# Patient Record
Sex: FEMALE | Race: WHITE | NOT HISPANIC OR LATINO | Employment: STUDENT | URBAN - METROPOLITAN AREA
[De-identification: names, ages, dates, MRNs, and addresses within clinical notes are randomized per-mention and may not be internally consistent; named-entity substitution may affect disease eponyms.]

---
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
## 2020-01-03 ENCOUNTER — TRANSFERRED RECORDS (OUTPATIENT)
Dept: HEALTH INFORMATION MANAGEMENT | Facility: CLINIC | Age: 20
End: 2020-01-03

## 2020-01-03 LAB — RETINOPATHY: NEGATIVE

## 2020-01-16 ENCOUNTER — ALLIED HEALTH/NURSE VISIT (OUTPATIENT)
Dept: NURSING | Facility: CLINIC | Age: 20
End: 2020-01-16
Payer: COMMERCIAL

## 2020-01-16 DIAGNOSIS — E10.9 TYPE 1 DIABETES MELLITUS WITHOUT COMPLICATION (H): ICD-10-CM

## 2020-01-16 PROCEDURE — 90732 PPSV23 VACC 2 YRS+ SUBQ/IM: CPT

## 2020-01-16 PROCEDURE — 90471 IMMUNIZATION ADMIN: CPT

## 2020-03-26 DIAGNOSIS — E10.65 TYPE 1 DIABETES MELLITUS WITH HYPERGLYCEMIA (H): ICD-10-CM

## 2020-03-26 RX ORDER — PROCHLORPERAZINE 25 MG/1
1 SUPPOSITORY RECTAL
Qty: 1 EACH | Refills: 3 | Status: SHIPPED | OUTPATIENT
Start: 2020-03-26 | End: 2021-03-31

## 2020-03-26 RX ORDER — PROCHLORPERAZINE 25 MG/1
3 SUPPOSITORY RECTAL
Qty: 3 EACH | Refills: 11 | Status: SHIPPED | OUTPATIENT
Start: 2020-03-26 | End: 2021-03-31

## 2020-03-31 DIAGNOSIS — E10.65 TYPE 1 DIABETES MELLITUS WITH HYPERGLYCEMIA (H): ICD-10-CM

## 2020-04-07 ENCOUNTER — VIRTUAL VISIT (OUTPATIENT)
Dept: PEDIATRICS | Facility: CLINIC | Age: 20
End: 2020-04-07
Attending: PEDIATRICS
Payer: COMMERCIAL

## 2020-04-07 DIAGNOSIS — E10.649 TYPE 1 DIABETES MELLITUS WITH HYPOGLYCEMIA AND WITHOUT COMA (H): ICD-10-CM

## 2020-04-07 RX ORDER — BLOOD SUGAR DIAGNOSTIC
STRIP MISCELLANEOUS
Qty: 250 EACH | Refills: 11 | Status: SHIPPED | OUTPATIENT
Start: 2020-04-07 | End: 2021-03-31

## 2020-04-07 NOTE — PROGRESS NOTES
"  Pediatric Endocrinology Follow-up Consultation: Diabetes    Patient: Heidi Madden MRN# [id number]   YOB: 2000 Age: 19 year old   Date of Visit: Apr 7, 2020    Heidi Madden is a 19 year old female who is being evaluated via a billable video visit.      The patient has been notified of following:     \"This video visit will be conducted via a call between you and your physician/provider. We have found that certain health care needs can be provided without the need for an in-person physical exam.  This service lets us provide the care you need with a video conversation.  If a prescription is necessary we can send it directly to your pharmacy.  If lab work is needed we can place an order for that and you can then stop by our lab to have the test done at a later time.    Video visits are billed at different rates depending on your insurance coverage.  Please reach out to your insurance provider with any questions.    If during the course of the call the physician/provider feels a video visit is not appropriate, you will not be charged for this service.\"    Patient has given verbal consent for Video visit? Yes    Patient would like the video invitation sent by: Send to e-mail at: [email or web address]    Video Start Time: 08:09 AM    Heidi Madden complains of    Chief Complaint   Patient presents with     RECHECK     diabetes       I have reviewed and updated the patient's Past Medical History, Social History, Family History and Medication List.    ALLERGIES  No known drug allergies        Video-Visit Details    Type of service:  Video Visit    Video End Time (time video stopped): 08:37 AM    Originating Location (pt. Location): Home    Distant Location (provider location):  Home office due to the COVID-19 pandemic     Mode of Communication:  Video Conference via [facility]      Dear Dr. Lacey Hillman:    I had the pleasure of seeing your patient, Heidi Madden via a virtual (video) visit on " "Apr 7, 2020 for a follow-up consultation of type 1 diabetes.           Problem list:     Patient Active Problem List    Diagnosis Date Noted     Abnormal finding on thyroid function test 04/06/2018     Priority: Medium     Diabetes mellitus type 1 (H) 03/20/2018     Priority: Medium     Acne vulgaris 06/14/2015     Priority: Medium     Tinea versicolor 06/14/2015     Priority: Medium     Anxiety 11/17/2013     Priority: Medium     Mood swing 09/21/2013     Priority: Medium     Hematochezia 09/09/2013     Priority: Medium            HPI:   Heidi is a 19 year old female with Type 1 diabetes mellitus diagnosed 3/16/2018.    History was obtained from patient and electronic health record. Heidi was initially seen by Dr. Naldo Pisano in the ED on 3/16/2018, at which point her HbA1C was 10.7%, BG was 376 mg/dL and she had ketones in her urine and serum but was not acidotic. She had history of polyuria and weight loss. She was started on 15 units of Lantus (switched to Basaglar). I had the pleasure of evaluating her for the first time on 4/3/2018.     Interim History:  I conducted this virtual visit with Heidi.   Since her last clinic visit on 12/10/2019, she had been doing well overall, and has not had any hospitalizations or ED visits for diabetes, nor has she experienced any severe hypoglycemia requiring the use of glucagon.   Heidi has been having good BG levels over all. She has been having low BG levels after dinner. As a result, she's apprehensive about going to bed. She has to wake up between 2-4 am to check her BG levels. She no longer wants to wear her Dexcom as she explained that at one point the needle of the  got \"stuck in her skin\". She reports even taking a video recording of that but hasn't yet discussed it with the Dexcom representative or the diabetes team. She states that the Dexcom loses connectivity frequently.  She states she no longer feels that wearing Dexcom is safe but at the same time is " wondering how to get a full night's sleep without worrying about risk of hypoglycemia over night.     Today's concerns include: CGM issues  Date of diagnosis: 3/16/2018  Got her Dexcom: May 2019, started wearing it in June 2019  Hypoglycemia: Heidi is having 2-3 hypoglycemic readings per week. They typically occur after dinner.   Hyperglycemia: Elevated BG values tend to occur randomly and without a pattern.    DKA: Never.    Exercise:  no organized sports.    Blood Glucose Data:   Overall average: 133 mg/dL, SD --  BG checks/day: 4-9  Pattern: the glucose log shows a rare pattern of low glucose levels after dinner (at bed time) 2-3 times per week.     A1c:  Today s hemoglobin A1c:  Not done as this was a virtual visit.    Lab Results   Component Value Date    A1C 5.5 12/10/2019    A1C 6.1 09/10/2019    A1C 6.1 06/18/2019    A1C 5.7 02/19/2019    A1C 6.0 11/20/2018       Result was discussed at today's visit.     Current insulin regimen:   Injectable Insulin:   Lantus: 18 units at bedtime  Insulin lispro (Humalog):  Meal Coverage:   Breakfast: 1 unit per 6 gm carbohydrates  Lunch: 1 unit per 8 g  Dinner 1 unit per 8 g  Correction: 1 unit(s) per 50 mg/dL over 150    Insulin administration site(s): abdomen, thighs and arms.    I reviewed new history from the patient and the medical record.  I have reviewed previous lab results and records, patient BMI and the growth chart at today's virtual visit.  I have reviewed glucometer log.          Social History:   Heidi lives with her mother and her sister in [place], [place].  They have 2 dogs. Her father is not involved. She plays the trumpet. She likes to speak Monegasque and likes to watch movies.  She attends [employer] and is in her second year (she will graduate Spring 2020) and lives at home. She plans to go to [employer] in the fall to study Monegasque communication studies. She goes on bike rides, and goes on walks these days.      Diet: as per HPI (on and off a gluten-free diet).           Family History:     Family History   Problem Relation Age of Onset     Thyroid Disease Mother         hashimantonio'd     Family History Negative Father      Lipids Father      Psychotic Disorder Father         anger mood swings     Lipids Maternal Grandmother      Hypertension Maternal Grandfather      Diabetes Paternal Grandmother      Thyroid Disease Paternal Grandmother         hypo, partial thyroidectomy     Circulatory Paternal Grandmother         coroid arteries clogged, scraped     Heart Disease Paternal Grandfather         Angioplasty     Alcohol/Drug Paternal Grandfather      Family History Negative Sister      Family History Negative Sister      Lipids Sister         as early teen     Psychotic Disorder Other         bipolar  2 first cousins     Cancer No family hx of         sno cancer. maternal uncle   Diabetes: paternal grandmother (T2D). Maternal cousin and aunt (T2D), maternal great grandmother (is on insulin), and maternal uncle T2D.  Thyroid: Maternal grandmother and maternal aunt have hypothyroidism.   Celiac: paternal cousin was diagnosed with celiac disease (20's).    Family history was reviewed. Refer to the initial note.         Allergies:     Allergies   Allergen Reactions     No Known Drug Allergies            Medications:     Current Outpatient Medications   Medication Sig Dispense Refill     blood glucose monitoring (ONE TOUCH DELICA) lancets Use to test blood sugar 8 times daily or as directed. 300 each 11     Continuous Blood Gluc  (DEXCOM G6 ) [place] 1 each See Admin Instructions 1 Device 0     Continuous Blood Gluc Sensor (DEXCOM G6 SENSOR) MISC 3 each every 30 days 3 each 11     Continuous Blood Gluc Transmit (DEXCOM G6 TRANSMITTER) MISC 1 each every 3 months 1 each 3     Glucagon (BAQSIMI TWO PACK) 3 MG/DOSE POWD Spray 3 mg in nostril as needed (hypoglycemic siezure or unconsciousness) 1 each 1     insulin glargine (LANTUS SOLOSTAR) 100 UNIT/ML pen Inject 17  units daily. 15 mL 6     insulin lispro (HUMALOG KWIKPEN) 100 UNIT/ML pen Uses up to 40 units per day 15 mL 6     insulin pen needle (BD KRISTA U/F) 32G X 4 MM miscellaneous For use with insulin pen 8 times per day 250 each 6     ONETOUCH VERIO IQ test strip Use to test blood sugar 8 times daily or as directed. 250 each 11     Urine Glucose-Ketones Test STRP Check ketones when BG > 300 x 2 or when sick/vomiting 50 each 5           Review of Systems:   Gen: Negative.   Eye: Negative.  ENT: Negative.  Pulmonary:  Negative.  Cardiovascular: Negative.  Gastrointestinal: Negative.   Hematologic: Negative.  Genitourinary: Negative.  Musculoskeletal: Negative.  Psychiatric: she has not yet met with a psychologist, however, she used to meet with a  twice per week, who helps her cope. She has not met with the  in a while. She expressed feeling stressed by school, but a little less stressed as she's able to sleep in a little longer, and is taking her time at lunch given the shelter-in-place order.    Neurologic: Negative.  Skin: Negative.   Endocrine: as per above. Menarche at 13 years. Periods are regular.         Physical Exam:     Blood pressure percentiles are not available for patients who are 18 years or older.  Height: Data Unavailable, No height on file for this encounter.  Weight: 0 lbs 0 oz, No weight on file for this encounter.  BMI: There is no height or weight on file to calculate BMI., No height and weight on file for this encounter.      CONSTITUTIONAL:   Awake, alert, and in no apparent distress.   EYES: sclera appears anicteric  NECK: Symmetric thyroid, not enlarged.  LUNGS/Respiratory: No increased work of breathing.  NEUROLOGIC: alert, awake, oriented.   PSYCHIATRIC: Cooperative, no agitation.        Health Maintenance:   Type 1 Diabetes, Date of Diagnosis:  3/16/2018  History of DKA (cumulative, all dates): never  History of SHE (cumulative, all dates): never    Missed days of  school, related to diabetes concerns (DKA, hypoglycemia, or parental worry) excluding routine clinic appointments since last visit:  N/A  (Last visit date was:  12/10/2019)    Depression screening (10 yrs of age and older):    Today's PHQ-2 Score:     PHQ-2 ( 1999 Pfizer) 12/10/2019 9/10/2019   Q1: Little interest or pleasure in doing things 0 0   Q2: Feeling down, depressed or hopeless 1 0   PHQ-2 Score 1 0     Routine Health Screening for Diabetes  Last yearly labs: As below March 2019 (celiac and lipid panel), although she had thyroid labs 11/23/2018  Last influenza vaccine: nasal flu in November 2019  Last dental visit: Jan 2020  Last eye exam: Dec 2019        Laboratory results:     Hemoglobin A1C   Date Value Ref Range Status   12/10/2019 5.5 0 - 5.6 % Final     TSH   Date Value Ref Range Status   11/23/2018 3.52 0.40 - 4.00 mU/L Final     T4 Free   Date Value Ref Range Status   11/23/2018 0.91 0.76 - 1.46 ng/dL Final     Tissue Transglutaminase Antibody IgA   Date Value Ref Range Status   03/15/2019 <1 <7 U/mL Final     Comment:     Negative  The tTG-IgA assay has limited utility for patients with decreased levels of   IgA. Screening for celiac disease should include IgA testing to rule out   selective IgA deficiency and to guide selection and interpretation of   serological testing. tTG-IgG testing may be positive in celiac disease   patients with IgA deficiency.       Tissue Transglutaminase Patricia IgG   Date Value Ref Range Status   03/15/2019 1 <7 U/mL Final     Comment:     Negative     Cholesterol   Date Value Ref Range Status   03/15/2019 176 (H) <170 mg/dL Final     Comment:     Borderline high:  170-199 mg/dl  High:            >199 mg/dl       Triglycerides   Date Value Ref Range Status   03/15/2019 58 <90 mg/dL Final     Comment:     Fasting specimen     HDL Cholesterol   Date Value Ref Range Status   03/15/2019 55 >45 mg/dL Final     LDL Cholesterol Calculated   Date Value Ref Range Status    03/15/2019 109 <110 mg/dL Final     Comment:     Borderline high:  110-129 mg/dl  High:            >129 mg/dl       Non HDL Cholesterol   Date Value Ref Range Status   03/15/2019 121 (H) <120 mg/dL Final     Comment:     Borderline high:  120-144 mg/dl  High:            >144 mg/dl       Annual Labs:  TSH   Date Value Ref Range Status   11/23/2018 3.52 0.40 - 4.00 mU/L Final     T4 Free   Date Value Ref Range Status   11/23/2018 0.91 0.76 - 1.46 ng/dL Final     Tissue Transglutaminase Antibody IgA   Date Value Ref Range Status   03/15/2019 <1 <7 U/mL Final     Comment:     Negative  The tTG-IgA assay has limited utility for patients with decreased levels of   IgA. Screening for celiac disease should include IgA testing to rule out   selective IgA deficiency and to guide selection and interpretation of   serological testing. tTG-IgG testing may be positive in celiac disease   patients with IgA deficiency.       IGA   Date Value Ref Range Status   03/15/2019 51 (L) 70 - 380 mg/dL Final     No results found for: MICROL  No results found for: MICROALBUMIN  Creatinine   Date Value Ref Range Status   03/16/2018 0.56 0.50 - 1.00 mg/dL Final     No components found for: VID25    Diabetes Antibody Status (if checked):  No results found for: INAB, IA2ABY, IA2A, GLTA, ISCAB, [id number], FR392005, INSABRIA     Component      Latest Ref Rng & Units 11/23/2018   T4 Free      0.76 - 1.46 ng/dL 0.91   TSH      0.40 - 4.00 mU/L 3.52   Thyroglobulin Antibody      <40 IU/mL <20   Thyroid Peroxidase Antibody      <35 IU/mL <10     Component      Latest Ref Rng & Units 3/15/2019   Vitamin D Deficiency screening      20 - 75 ug/L 25          Assessment and Plan:   1- Type 1 diabetes mellitus with hypoglycemia  2- Hypoglycemia unawareness  3- Vitamin D insufficiency    Heidi is a 19 year old female with Type 1 diabetes mellitus diagnosed on 3/16/2018.  Her diabetes control is excellent. To mitigate night time hypoglycemia, I recommend  weakening her insulin:Carb ratio at dinner.     I discussed her concerns with the diabetes nurse educator (Asha) today and she will connect with Heidi today to trouble shoot and advise connecting with the Dexcom representative vs trying the Affinioan CGM. Given her hypoglycemia unawareness, she is a prime candidate for a CGM. She has started wearing the CGM June 2019.   Heidi has nasal glucagon (Baqsimi) at home.    She received the flu vaccinatin in November 2019.   She had her annual diabetes labs on 3/15/2019. Her celiac screen on March 2019 after being on a gluten-replete diet was negative with the caveat that her IgA level was low, which could give a false negative result. Heidi is questioning the results of her celiac screen. Given that Heidi is on a gluten-free diet, I recommend going back on a gluten-containing diet for 4 weeks then screening her for celiac disease. She will be due for labs in July 2020 (they were actually due in March, I'm postponing them due to the COVID-19 pandemic).    She graduates college Spring 2020. I discussed transition to an adult endocrinologist Summer 2020 if that is what she feels comfortable with. I am happy to continue to follow her if she would like to continue to come to our clinic for a couple more years.    Patient Instructions         1. Great talking to you today, Heidi! Keep up the good work!  2. HbA1c goal for Heidi:<7.5%   3. Yearly labs next due: July 2020 (they will be fasting labs). I suggest going back on a regular diet for at least 4 weeks before screening you for celiac in July 2020.    4. Asha Baez RN,will call you to discuss the issues you are having with the CGM.  5. Changes to diabetes plan: (see updated diabetes school plan below)  6. Flu shot at vaccination given in November 2019.    7. Please continue 2000 international units of vitamin D (D3) orally daily.  8. Follow up in 3 months. Please feel free to contact the diabetes team sooner if you are  having issues.     ------------------------------------------------------------------------------------------------------------------  DIABETES PLAN FOR Heidi Madden    How often to test:  Before breakfast  Before lunch  Before dinner  At bedtime  Other: with hypo- of hyperglycemia or at 2 AM if long-acting insulin doses were changed    Blood glucose goals:  Before meals and snacks:  mg/dL   At bedtime: 100-150 mg/dL     Insulin doses:  Long-acting (basal) insulin :   Lantus (Glargine): 18 units at bedtime (~ 10 pm daily)  Meal and snack (bolus) insulin Humalog  Carbohydrate ratio :  Humalog Take 1 unit(s) per 6 grams carbohydrate at breakfast.  Take 1 unit(s) per 8 grams carbohydrate at lunch.  Take 1 unit(s) per 10 grams carbohydrate at dinner (changed from 1 unit per 8 g).    Sensitivity/Correction insulin :  (in addition to scheduled meal dose - to correct out-of-range blood glucose):  1 unit per 50 over 150 mg/dL   Blood Glucose level  Humalog   151 to 200 mg/dl Give 1 unit   201 to 250 mg/dl Give 2 units   251 to 300 mg/dl Give 3 units   301 to 350 mg/dl Give 4 units   351 to 400 mg/dl Give 5 units   401 to 450 mg/dl Give 6 units   >450 mg/dl Give 7 units       Hyperglycemia (high blood glucose):  Ketones:  Check urine/blood ketones if Heidi is sick or blood glucose is above 240 twice in a row. Call parents or care team if ketones are present.  Check for ketones when sick or vomiting  Check for ketones when blood glucose is greater than 240 twice in a row. If ketones are present call your diabetes nurse or doctor.    Hypoglycemia (low blood glucose):  If blood glucose is 60 to 80:  1.  Eat or drink 1 carb unit (15 grams carbohydrate).   One carb unit equals:   - 1/2 cup (4 ounces) juice or regular soda pop, or   - 1 cup (8 ounces) milk, or   - 3 to 4 glucose tablets  2.  Re-check your blood glucose in 15 minutes.  3.  Repeat these steps every 15 minutes until your blood glucose is above 100.    If blood  glucose is under 60:  1.  Eat or drink 2 carb units (30 grams carbohydrate).  Two carb units equal:   - 1 cup (8 ounces) juice or regular soda pop, or   - 2 cups (16 ounces) milk, or   - 6 to 8 glucose tablets.  2.  Re-check your blood glucose in 15 minutes.  3.  Repeat these steps every 15 minutes until your blood glucose is above 100.    Contacting a doctor or a nurse:  You may contact your diabetes nurse with any questions.   Call: Asha Archer RN, [phone]  Your Provider is: Dr. Kimberli Gomez  ADDRESS: [facility] [address], [place] [address]  Fax: [phone]. Tel: [phone].  After business hours:  Call [phone] (TTY: [phone]).  Ask to speak with an endocrinologist (diabetes doctor).  A doctor is on-call 24 hours a day.    I had discussed Heidi's condition with the diabetes nurse educator today, and had independently reviewed the blood glucose downloads. Diabetes is a chronic illness with potential serious long term effects on various organs requiring intensive monitoring of therapy for safety and efficacy.       The plan had been discussed in detail with Heidi who is in agreement.  Thank you for allowing me to participate in the care of your patient.  Please do not hesitate to call with questions or concerns.    Video Start Time (time video stopped): 08:09 AM  Video End Time (time video stopped): 08:37 AM    Sincerely,    LISA SinhaBapt[facility], [place]  , Pediatric Endocrinology  [facility]   Tel. [phone]  Fax [phone]    CC  Patient Care Team:  Lacey Hillman MD as PCP - General  Lacey Hillman MD as Assigned PCP  Mariposa Mishra

## 2020-04-07 NOTE — PATIENT INSTRUCTIONS
1. Great talking to you today, Heidi! Keep up the good work!  2. HbA1c goal for Heidi:<7.5%   3. Yearly labs next due: July 2020 (they will be fasting labs). I suggest going back on a regular diet for at least 4 weeks before screening you for celiac in July 2020.    4. Asha Baez RN,will call you to discuss the issues you are having with the CGM.  5. Changes to diabetes plan: (see updated diabetes school plan below)  6. Flu shot at vaccination given in November 2019.    7. Please continue 2000 international units of vitamin D (D3) orally daily.  8. Follow up in 3 months. Please feel free to contact the diabetes team sooner if you are having issues.     ------------------------------------------------------------------------------------------------------------------  DIABETES PLAN FOR Heidi TYESHA Madden    How often to test:  Before breakfast  Before lunch  Before dinner  At bedtime  Other: with hypo- of hyperglycemia or at 2 AM if long-acting insulin doses were changed    Blood glucose goals:  Before meals and snacks:  mg/dL   At bedtime: 100-150 mg/dL     Insulin doses:  Long-acting (basal) insulin :   Lantus (Glargine): 18 units at bedtime (~ 10 pm daily)  Meal and snack (bolus) insulin Humalog  Carbohydrate ratio :  Humalog Take 1 unit(s) per 6 grams carbohydrate at breakfast.  Take 1 unit(s) per 8 grams carbohydrate at lunch.  Take 1 unit(s) per 10 grams carbohydrate at dinner (changed from 1 unit per 8 g).    Sensitivity/Correction insulin :  (in addition to scheduled meal dose - to correct out-of-range blood glucose):  1 unit per 50 over 150 mg/dL   Blood Glucose level  Humalog   151 to 200 mg/dl Give 1 unit   201 to 250 mg/dl Give 2 units   251 to 300 mg/dl Give 3 units   301 to 350 mg/dl Give 4 units   351 to 400 mg/dl Give 5 units   401 to 450 mg/dl Give 6 units   >450 mg/dl Give 7 units       Hyperglycemia (high blood glucose):  Ketones:  Check urine/blood ketones if Heidi is sick or blood  glucose is above 240 twice in a row. Call parents or care team if ketones are present.  Check for ketones when sick or vomiting  Check for ketones when blood glucose is greater than 240 twice in a row. If ketones are present call your diabetes nurse or doctor.    Hypoglycemia (low blood glucose):  If blood glucose is 60 to 80:  1.  Eat or drink 1 carb unit (15 grams carbohydrate).   One carb unit equals:   - 1/2 cup (4 ounces) juice or regular soda pop, or   - 1 cup (8 ounces) milk, or   - 3 to 4 glucose tablets  2.  Re-check your blood glucose in 15 minutes.  3.  Repeat these steps every 15 minutes until your blood glucose is above 100.    If blood glucose is under 60:  1.  Eat or drink 2 carb units (30 grams carbohydrate).  Two carb units equal:   - 1 cup (8 ounces) juice or regular soda pop, or   - 2 cups (16 ounces) milk, or   - 6 to 8 glucose tablets.  2.  Re-check your blood glucose in 15 minutes.  3.  Repeat these steps every 15 minutes until your blood glucose is above 100.    Contacting a doctor or a nurse:  You may contact your diabetes nurse with any questions.   Call: Asha Archer RN, [phone]  Your Provider is: Dr. Kimberli Gomez  ADDRESS: [facility] [address], [place] [address]  Fax: [phone]. Tel: [phone].  After business hours:  Call [phone] (TTY: [phone]).  Ask to speak with an endocrinologist (diabetes doctor).  A doctor is on-call 24 hours a day.

## 2020-04-07 NOTE — NURSING NOTE
"Heidi Madden is a 19 year old female who is being evaluated via a billable video visit.      The patient has been notified of following:     \"This video visit will be conducted via a call between you and your physician/provider. We have found that certain health care needs can be provided without the need for an in-person physical exam.  This service lets us provide the care you need with a video conversation.  If a prescription is necessary we can send it directly to your pharmacy.  If lab work is needed we can place an order for that and you can then stop by our lab to have the test done at a later time.    If during the course of the call the physician/provider feels a video visit is not appropriate, you will not be charged for this service.\"     Patient has given verbal consent for Video visit? Yes    Patient would like the video invitation sent by: Text to cell phone: [phone]    Video Start Time: 8:40am    Heidi Madden complains of    Chief Complaint   Patient presents with     RECHECK     diabetes       I have reviewed and updated the patient's Past Medical History, Social History, Family History and Medication List.    ALLERGIES  No known drug allergies          "

## 2020-07-02 ENCOUNTER — MYC MEDICAL ADVICE (OUTPATIENT)
Dept: ENDOCRINOLOGY | Facility: CLINIC | Age: 20
End: 2020-07-02

## 2020-07-06 NOTE — PROGRESS NOTES
"    Pediatric Endocrinology Follow-up Consultation: Diabetes    Patient: Heidi Madden MRN# [id number]   YOB: 2000 Age: 20 year old   Date of Visit: Jul 7, 2020    Heidi Madden is a 19 year old female who is being evaluated via a billable video visit.      The patient has been notified of following:     \"This video visit will be conducted via a call between you and your physician/provider. We have found that certain health care needs can be provided without the need for an in-person physical exam.  This service lets us provide the care you need with a video conversation.  If a prescription is necessary we can send it directly to your pharmacy.  If lab work is needed we can place an order for that and you can then stop by our lab to have the test done at a later time.    Video visits are billed at different rates depending on your insurance coverage.  Please reach out to your insurance provider with any questions.    If during the course of the call the physician/provider feels a video visit is not appropriate, you will not be charged for this service.\"    Patient has given verbal consent for Video visit? Yes    Patient would like the video invitation sent by: Send to e-mail at: [email or web address]    Video Start Time: 8:45 AM    Heidi Madden complains of    Chief Complaint   Patient presents with     Clinic Care Coordination - Follow-up     diabetes       I have reviewed and updated the patient's Past Medical History, Social History, Family History and Medication List.    ALLERGIES  No known drug allergies        Video-Visit Details    Type of service:  Video Visit    Video End Time (time video stopped): 08:37 AM    Originating Location (pt. Location): Home    Distant Location (provider location):  Home office due to the COVID-19 pandemic     Mode of Communication:  [facility]      Dear Dr. Lacey Hillman:    I had the pleasure of seeing your patient, Heidi Madden " via a virtual (video) visit on Jul 7, 2020 for a follow-up consultation of type 1 diabetes.           Problem list:     Patient Active Problem List    Diagnosis Date Noted     Abnormal finding on thyroid function test 04/06/2018     Priority: Medium     Type 1 diabetes mellitus with hypoglycemia (H) 03/20/2018     Priority: Medium     Acne vulgaris 06/14/2015     Priority: Medium     Tinea versicolor 06/14/2015     Priority: Medium     Anxiety 11/17/2013     Priority: Medium     Mood swing 09/21/2013     Priority: Medium     Hematochezia 09/09/2013     Priority: Medium            HPI:   Heidi is a 20 year old female with Type 1 diabetes mellitus diagnosed 3/16/2018.    History was obtained from patient and electronic health record. Heidi was initially seen by Dr. Naldo Pisano in the ED on 3/16/2018, at which point her HbA1C was 10.7%, BG was 376 mg/dL and she had ketones in her urine and serum but was not acidotic. She had history of polyuria and weight loss. She was started on 15 units of Lantus (switched to Basaglar). I had the pleasure of evaluating her for the first time on 4/3/2018.     Interim History:  I conducted this virtual visit with Heidi.   Since her last clinic visit on 4/7/2020, she had been doing well overall, and has not had any hospitalizations or ED visits for diabetes, nor has she experienced any severe hypoglycemia requiring the use of glucagon.   Heidi has been having low BG levels after dinner. As a result, she's apprehensive about going to bed. She has to wake up between 2-4 am to check her BG levels.   She decided to use the Dexcom yesterday so that she could sleep better at night. She had no issues over night.     Today's concerns include: None  Date of diagnosis: 3/16/2018  Got her Dexcom: May 2019, started wearing it in June 2019  Hypoglycemia: Heidi is having 2-3 hypoglycemic readings per week. They typically occur after dinner.   Hyperglycemia: Elevated BG values tend to occur randomly and  without a pattern.    DKA: Never.    Exercise:  Goes for out for a walk and a bike.    Blood Glucose Data:   Overall average: 122 mg/dL, SD --  BG checks/day: 5-6  I reviewed her glucose log 6/20/2020-7/4/2020. These are glucose levels for the past few days:   July 1  3:09 am: 100  8:21 am: 65  8:59 am: 167  1:25 pm: 100  7:25 pm: 93  9:41 pm: 159  July 2  3:13 am: 60  4:46 am: 154  6:09 am: 123  12:21 pm: 102  5:02 pm: 218  7:29 pm: 68  9:04 pm: 63  9:30 pm: 107  July 3  3:02 am: 191  1:39 pm: 131  4:37 pm: 297  6:23 pm: 138  7:36 pm: 64  July 4  12:55 am: 165  5:38 am: 172  10:16 am: 112  1:14 pm: 139  5:41 pm: 169    Pattern: the glucose log shows a rare pattern of low glucose levels after dinner (at bed time) 2-3 times per week.     A1c:  Today s hemoglobin A1c:  Not done as this was a virtual visit.    Lab Results   Component Value Date    A1C 5.5 12/10/2019    A1C 6.1 09/10/2019    A1C 6.1 06/18/2019    A1C 5.7 02/19/2019    A1C 6.0 11/20/2018       Result was discussed at today's visit.     Current insulin regimen:   Injectable Insulin:   Lantus: 15 units at bedtime  Insulin lispro (Humalog):  Meal Coverage:   Breakfast: 1 unit per 7.5-8 gm carbohydrates  Lunch: 1 unit per 8 g  Dinner 1 unit per 8 g  Correction: 1 unit(s) per 50 mg/dL over 150    Insulin administration site(s): abdomen, thighs and arms.    I reviewed new history from the patient and the medical record.  I have reviewed previous lab results and records, patient BMI and the growth chart at today's virtual visit.  I have reviewed glucometer log.          Social History:   Heidi lives with her mother and her sister in [place], [place].  They have 2 dogs. Her father is not involved. She plays the trumpet. She speaks Moroccan fluently and likes to watch movies.  She attends [employer] (she graduated Spring 2020) and lives at home. She plans to go to [employer] in the fall to study Moroccan communication studies. She goes on bike rides, and goes on walks these  days.      Diet: as per HPI (on and off a gluten-free diet).          Family History:     Family History   Problem Relation Age of Onset     Thyroid Disease Mother         hashimoyoto'd     Family History Negative Father      Lipids Father      Psychotic Disorder Father         anger mood swings     Lipids Maternal Grandmother      Hypertension Maternal Grandfather      Diabetes Paternal Grandmother      Thyroid Disease Paternal Grandmother         hypo, partial thyroidectomy     Circulatory Paternal Grandmother         coroid arteries clogged, scraped     Heart Disease Paternal Grandfather         Angioplasty     Alcohol/Drug Paternal Grandfather      Family History Negative Sister      Family History Negative Sister      Lipids Sister         as early teen     Psychotic Disorder Other         bipolar  2 first cousins     Cancer No family hx of         sno cancer. maternal uncle     Diabetes: paternal grandmother (T2D). Maternal cousin and aunt (T2D), maternal great grandmother (is on insulin), and maternal uncle T2D.  Thyroid: Maternal grandmother and maternal aunt have hypothyroidism.   Celiac: paternal cousin was diagnosed with celiac disease (20's).    Family history was reviewed. Refer to the initial note.         Allergies:     Allergies   Allergen Reactions     No Known Drug Allergies            Medications:     Current Outpatient Medications   Medication Sig Dispense Refill     insulin lispro (HUMALOG KWIKPEN) 100 UNIT/ML (1 unit dial) KWIKPEN The patient uses up to 40 units per day. 15 mL 6     ketoconazole (NIZORAL) 2 % external shampoo Apply topically daily as needed for itching or irritation Apply to skin twice per week for 4 weeks, allow 3 days apart between applications. 120 mL 0     blood glucose monitoring (ONE TOUCH DELICA) lancets Use to test blood sugar 8 times daily or as directed. 300 each 11     Continuous Blood Gluc  (DEXCOM G6 ) [place] 1 each See Admin Instructions 1 Device 0      Continuous Blood Gluc Sensor (DEXCOM G6 SENSOR) MISC 3 each every 30 days 3 each 11     Continuous Blood Gluc Transmit (DEXCOM G6 TRANSMITTER) MISC 1 each every 3 months 1 each 3     Glucagon (BAQSIMI TWO PACK) 3 MG/DOSE POWD Spray 3 mg in nostril as needed (hypoglycemic siezure or unconsciousness) 1 each 1     insulin glargine (LANTUS SOLOSTAR) 100 UNIT/ML pen Inject 17 units daily. 15 mL 6     insulin lispro (HUMALOG KWIKPEN) 100 UNIT/ML pen Uses up to 40 units per day 15 mL 6     insulin pen needle (BD KRISTA U/F) 32G X 4 MM miscellaneous For use with insulin pen 8 times per day 250 each 6     ONETOUCH VERIO IQ test strip Use to test blood sugar 8 times daily or as directed. 250 each 11     Urine Glucose-Ketones Test STRP Check ketones when BG > 300 x 2 or when sick/vomiting 50 each 5           Review of Systems:   Gen: Negative.   Eye: Negative.  ENT: Negative.  Pulmonary:  Negative.  Cardiovascular: Negative.  Gastrointestinal: Negative.   Hematologic: Negative.  Genitourinary: Negative.  Musculoskeletal: Negative.  Psychiatric: Negative.    Neurologic: Negative.  Skin: Tinea versicolor-- she has had twice before.   Endocrine: as per above. Menarche at 13 years. Periods are regular.         Physical Exam:     Blood pressure percentiles are not available for patients who are 18 years or older.  Height: Data Unavailable, Normalized stature-for-age data not available for patients older than 20 years.  Weight: 0 lbs 0 oz, Normalized weight-for-age data not available for patients older than 20 years.  BMI: There is no height or weight on file to calculate BMI., No height and weight on file for this encounter.      CONSTITUTIONAL:   Awake, alert, and in no apparent distress.   EYES: sclera appears anicteric  NECK: Symmetric thyroid, not enlarged.  LUNGS/Respiratory: No increased work of breathing.  NEUROLOGIC: alert, awake, oriented.   PSYCHIATRIC: Cooperative, no agitation.        Health Maintenance:   Type 1  Diabetes, Date of Diagnosis:  3/16/2018  History of DKA (cumulative, all dates): never  History of SHE (cumulative, all dates): never    Missed days of school, related to diabetes concerns (DKA, hypoglycemia, or parental worry) excluding routine clinic appointments since last visit:  N/A  (Last visit date was:  4/7/2020)    Depression screening (10 yrs of age and older):    Today's PHQ-2 Score:     PHQ-2 ( 1999 Pfizer) 12/10/2019 9/10/2019   Q1: Little interest or pleasure in doing things 0 0   Q2: Feeling down, depressed or hopeless 1 0   PHQ-2 Score 1 0     Routine Health Screening for Diabetes  Last yearly labs: As below March 2019 (celiac and lipid panel), although she had thyroid labs 11/23/2018  Last influenza vaccine: nasal flu in November 2019  Last dental visit: Jan 2020-- she has an appointment scheduled for this month.  Last eye exam: Dec 2019        Laboratory results:     Hemoglobin A1C   Date Value Ref Range Status   12/10/2019 5.5 0 - 5.6 % Final     TSH   Date Value Ref Range Status   11/23/2018 3.52 0.40 - 4.00 mU/L Final     T4 Free   Date Value Ref Range Status   11/23/2018 0.91 0.76 - 1.46 ng/dL Final     Tissue Transglutaminase Antibody IgA   Date Value Ref Range Status   03/15/2019 <1 <7 U/mL Final     Comment:     Negative  The tTG-IgA assay has limited utility for patients with decreased levels of   IgA. Screening for celiac disease should include IgA testing to rule out   selective IgA deficiency and to guide selection and interpretation of   serological testing. tTG-IgG testing may be positive in celiac disease   patients with IgA deficiency.       Tissue Transglutaminase Patricia IgG   Date Value Ref Range Status   03/15/2019 1 <7 U/mL Final     Comment:     Negative     Cholesterol   Date Value Ref Range Status   03/15/2019 176 (H) <170 mg/dL Final     Comment:     Borderline high:  170-199 mg/dl  High:            >199 mg/dl       Triglycerides   Date Value Ref Range Status   03/15/2019 58 <90  mg/dL Final     Comment:     Fasting specimen     HDL Cholesterol   Date Value Ref Range Status   03/15/2019 55 >45 mg/dL Final     LDL Cholesterol Calculated   Date Value Ref Range Status   03/15/2019 109 <110 mg/dL Final     Comment:     Borderline high:  110-129 mg/dl  High:            >129 mg/dl       Non HDL Cholesterol   Date Value Ref Range Status   03/15/2019 121 (H) <120 mg/dL Final     Comment:     Borderline high:  120-144 mg/dl  High:            >144 mg/dl       Annual Labs:  TSH   Date Value Ref Range Status   11/23/2018 3.52 0.40 - 4.00 mU/L Final     T4 Free   Date Value Ref Range Status   11/23/2018 0.91 0.76 - 1.46 ng/dL Final     Tissue Transglutaminase Antibody IgA   Date Value Ref Range Status   03/15/2019 <1 <7 U/mL Final     Comment:     Negative  The tTG-IgA assay has limited utility for patients with decreased levels of   IgA. Screening for celiac disease should include IgA testing to rule out   selective IgA deficiency and to guide selection and interpretation of   serological testing. tTG-IgG testing may be positive in celiac disease   patients with IgA deficiency.       IGA   Date Value Ref Range Status   03/15/2019 51 (L) 70 - 380 mg/dL Final     No results found for: MICROL  No results found for: MICROALBUMIN  Creatinine   Date Value Ref Range Status   03/16/2018 0.56 0.50 - 1.00 mg/dL Final     No components found for: VID25    Diabetes Antibody Status (if checked):  No results found for: INAB, IA2ABY, IA2A, GLTA, ISCAB, DW078295, BG501991, INSABRIA     Component      Latest Ref Rng & Units 11/23/2018   T4 Free      0.76 - 1.46 ng/dL 0.91   TSH      0.40 - 4.00 mU/L 3.52   Thyroglobulin Antibody      <40 IU/mL <20   Thyroid Peroxidase Antibody      <35 IU/mL <10     Component      Latest Ref Rng & Units 3/15/2019   Vitamin D Deficiency screening      20 - 75 ug/L 25          Assessment and Plan:   1- Type 1 diabetes mellitus with hypoglycemia  2- Hypoglycemia unawareness  3- Vitamin D  tarik Gordon is a 20 year old female with Type 1 diabetes mellitus diagnosed on 3/16/2018.  Her diabetes control is good. She continues to repeatedly have hypoglycemia after dinner. To mitigate night time hypoglycemia, I recommend weakening her insulin:Carb ratio at dinner.     I am pleased that she resumed using her Dexcom yesterday and so far is not having issues with it. Given her hypoglycemia unawareness, she is a prime candidate for a CGM.    Heidi has nasal glucagon (Baqsimi) at home.    She received the flu vaccinatin in November 2019.   She had her annual diabetes labs on 3/15/2019. Her celiac screen on March 2019 after being on a gluten-replete diet was negative with the caveat that her IgA level was low, which could give a false negative result. Heidi is questioning the results of her celiac screen. Given that Heidi is on a gluten-free diet, I recommend going back on a gluten-containing diet for 4 weeks then screening her for celiac disease. She is due for labs (they were actually due in March, I'm postponing them due to the COVID-19 pandemic).    She graduates college Spring 2020. I discussed transition to an adult endocrinologist Summer 2020 if that is what she feels comfortable with. I am happy to continue to follow her if she would like to continue to come to our clinic for a couple more years.    Patient Instructions         1. Great talking to you today, Heidi! Keep up the good work!  2. HbA1c goal for Heidi:<7.5%   3. Yearly labs next due: July 2020 (they will be fasting labs). I suggest going back on a regular diet for at least 4 weeks before screening you for celiac in July 2020.    4. Asha Baez RN,will call you to discuss the issues you are having with the CGM.  5. Changes to diabetes plan: (see updated diabetes school plan below)  6. Flu shot at vaccination given in November 2019.    7. Please continue 2000 international units of vitamin D (D3) orally daily.  8. Follow up in 3  months. Please feel free to contact the diabetes team sooner if you are having issues.   9.   Here is a suggestion for an adult endocrinologist:  Zakia Fox MD  To schedule an appointment with her call:   Appointments:[phone] ([phone])     ------------------------------------------------------------------------------------------------------------------  DIABETES PLAN FOR Heidi Madden    How often to test:  Before breakfast  Before lunch  Before dinner  At bedtime  Other: with hypo- of hyperglycemia or at 2 AM if long-acting insulin doses were changed    Blood glucose goals:  Before meals and snacks:  mg/dL   At bedtime: 100-150 mg/dL     Insulin doses:  Long-acting (basal) insulin :   Lantus (Glargine): 15 units at bedtime (~ 10 pm daily)  Meal and snack (bolus) insulin Humalog  Carbohydrate ratio :  Humalog Take 1 unit(s) per 7.5-8 grams carbohydrate at breakfast.  Take 1 unit(s) per 8 grams carbohydrate at lunch.  Take 1 unit(s) per 10 grams carbohydrate at dinner (changed from 1 unit per 8 g).    Sensitivity/Correction insulin :  (in addition to scheduled meal dose - to correct out-of-range blood glucose):  1 unit per 50 over 150 mg/dL   Blood Glucose level  Humalog   151 to 200 mg/dl Give 1 unit   201 to 250 mg/dl Give 2 units   251 to 300 mg/dl Give 3 units   301 to 350 mg/dl Give 4 units   351 to 400 mg/dl Give 5 units   401 to 450 mg/dl Give 6 units   >450 mg/dl Give 7 units       Hyperglycemia (high blood glucose):  Ketones:  Check urine/blood ketones if Heidi is sick or blood glucose is above 240 twice in a row. Call parents or care team if ketones are present.  Check for ketones when sick or vomiting  Check for ketones when blood glucose is greater than 240 twice in a row. If ketones are present call your diabetes nurse or doctor.    Hypoglycemia (low blood glucose):  If blood glucose is 60 to 80:  1.  Eat or drink 1 carb unit (15 grams carbohydrate).   One carb unit equals:   - 1/2  cup (4 ounces) juice or regular soda pop, or   - 1 cup (8 ounces) milk, or   - 3 to 4 glucose tablets  2.  Re-check your blood glucose in 15 minutes.  3.  Repeat these steps every 15 minutes until your blood glucose is above 100.    If blood glucose is under 60:  1.  Eat or drink 2 carb units (30 grams carbohydrate).  Two carb units equal:   - 1 cup (8 ounces) juice or regular soda pop, or   - 2 cups (16 ounces) milk, or   - 6 to 8 glucose tablets.  2.  Re-check your blood glucose in 15 minutes.  3.  Repeat these steps every 15 minutes until your blood glucose is above 100.    Contacting a doctor or a nurse:  You may contact your diabetes nurse with any questions.   Call: Asha Archer RN, [phone]  Your Provider is: Dr. Kimberli Gomez  ADDRESS: [facility] [address], [place] [address]  Fax: [phone]. Tel: [phone].  After business hours:  Call [phone] (TTY: [phone]).  Ask to speak with an endocrinologist (diabetes doctor).  A doctor is on-call 24 hours a day.    I had discussed Heidi's condition with the diabetes nurse educator today, and had independently reviewed the blood glucose downloads. Diabetes is a chronic illness with potential serious long term effects on various organs requiring intensive monitoring of therapy for safety and efficacy.       The plan had been discussed in detail with Heidi who is in agreement.  Thank you for allowing me to participate in the care of your patient.  Please do not hesitate to call with questions or concerns.    Video Start Time (time video stopped): 8:45 AM  Video End Time (time video stopped): 9:11 AM    Sincerely,    LISA SinhaMonroe County Hospital, MS  , Pediatric Endocrinology  [facility]   Tel. [phone]  Fax [phone]    CC  Patient Care Team:  Lacey Hillman MD as PCP - General  Lacey Hillman MD as Assigned PCP  Tad  Mariposa BURK

## 2020-07-06 NOTE — PATIENT INSTRUCTIONS
1. Great talking to you today, Heidi! Keep up the good work!  2. HbA1c goal for Heidi:<7.5%   3. Yearly labs next due: July 2020 (they will be fasting labs). I suggest going back on a regular diet for at least 4 weeks before screening you for celiac in July 2020.    4. Asha Baez RN,will call you to discuss the issues you are having with the CGM.  5. Changes to diabetes plan: (see updated diabetes school plan below)  6. Flu shot at vaccination given in November 2019.    7. Please continue 2000 international units of vitamin D (D3) orally daily.  8. Follow up in 3 months. Please feel free to contact the diabetes team sooner if you are having issues.   9.   Here is a suggestion for an adult endocrinologist:  Zakia Fox MD  To schedule an appointment with her call:   Appointments:[phone] ([phone])     ------------------------------------------------------------------------------------------------------------------  DIABETES PLAN FOR Heidi ARAMBULA Maryanne    How often to test:  Before breakfast  Before lunch  Before dinner  At bedtime  Other: with hypo- of hyperglycemia or at 2 AM if long-acting insulin doses were changed    Blood glucose goals:  Before meals and snacks:  mg/dL   At bedtime: 100-150 mg/dL     Insulin doses:  Long-acting (basal) insulin :   Lantus (Glargine): 15 units at bedtime (~ 10 pm daily)  Meal and snack (bolus) insulin Humalog  Carbohydrate ratio :  Humalog Take 1 unit(s) per 7.5-8 grams carbohydrate at breakfast.  Take 1 unit(s) per 8 grams carbohydrate at lunch.  Take 1 unit(s) per 10 grams carbohydrate at dinner (changed from 1 unit per 8 g).    Sensitivity/Correction insulin :  (in addition to scheduled meal dose - to correct out-of-range blood glucose):  1 unit per 50 over 150 mg/dL   Blood Glucose level  Humalog   151 to 200 mg/dl Give 1 unit   201 to 250 mg/dl Give 2 units   251 to 300 mg/dl Give 3 units   301 to 350 mg/dl Give 4 units   351 to 400 mg/dl Give 5  units   401 to 450 mg/dl Give 6 units   >450 mg/dl Give 7 units       Hyperglycemia (high blood glucose):  Ketones:  Check urine/blood ketones if Heidi is sick or blood glucose is above 240 twice in a row. Call parents or care team if ketones are present.  Check for ketones when sick or vomiting  Check for ketones when blood glucose is greater than 240 twice in a row. If ketones are present call your diabetes nurse or doctor.    Hypoglycemia (low blood glucose):  If blood glucose is 60 to 80:  1.  Eat or drink 1 carb unit (15 grams carbohydrate).   One carb unit equals:   - 1/2 cup (4 ounces) juice or regular soda pop, or   - 1 cup (8 ounces) milk, or   - 3 to 4 glucose tablets  2.  Re-check your blood glucose in 15 minutes.  3.  Repeat these steps every 15 minutes until your blood glucose is above 100.    If blood glucose is under 60:  1.  Eat or drink 2 carb units (30 grams carbohydrate).  Two carb units equal:   - 1 cup (8 ounces) juice or regular soda pop, or   - 2 cups (16 ounces) milk, or   - 6 to 8 glucose tablets.  2.  Re-check your blood glucose in 15 minutes.  3.  Repeat these steps every 15 minutes until your blood glucose is above 100.    Contacting a doctor or a nurse:  You may contact your diabetes nurse with any questions.   Call: Asha Archer RN, [phone]  Your Provider is: Dr. Kimberli Gomez  ADDRESS: [facility] [address], [place] [address]  Fax: [phone]. Tel: [phone].  After business hours:  Call [phone] (TTY: [phone]).  Ask to speak with an endocrinologist (diabetes doctor).  A doctor is on-call 24 hours a day.

## 2020-07-07 ENCOUNTER — VIRTUAL VISIT (OUTPATIENT)
Dept: PEDIATRICS | Facility: CLINIC | Age: 20
End: 2020-07-07
Attending: PEDIATRICS
Payer: COMMERCIAL

## 2020-07-07 DIAGNOSIS — B36.0 TINEA VERSICOLOR: ICD-10-CM

## 2020-07-07 DIAGNOSIS — E10.649 TYPE 1 DIABETES MELLITUS WITH HYPOGLYCEMIA AND WITHOUT COMA (H): Primary | ICD-10-CM

## 2020-07-07 RX ORDER — INSULIN LISPRO 100 [IU]/ML
INJECTION, SOLUTION INTRAVENOUS; SUBCUTANEOUS
Qty: 15 ML | Refills: 6 | Status: SHIPPED | OUTPATIENT
Start: 2020-07-07 | End: 2021-03-30

## 2020-07-07 RX ORDER — KETOCONAZOLE 20 MG/ML
SHAMPOO TOPICAL DAILY PRN
Qty: 120 ML | Refills: 0 | Status: SHIPPED | OUTPATIENT
Start: 2020-07-07 | End: 2022-05-25

## 2020-07-07 NOTE — NURSING NOTE
"Heidi Madden is a 20 year old female who is being evaluated via a billable video visit.      The patient has been notified of following:     \"This video visit will be conducted via a call between you and your physician/provider. We have found that certain health care needs can be provided without the need for an in-person physical exam.  This service lets us provide the care you need with a video conversation.  If a prescription is necessary we can send it directly to your pharmacy.  If lab work is needed we can place an order for that and you can then stop by our lab to have the test done at a later time.    Video visits are billed at different rates depending on your insurance coverage.  Please reach out to your insurance provider with any questions.    If during the course of the call the physician/provider feels a video visit is not appropriate, you will not be charged for this service.\"    Patient has given verbal consent for Video visit? Yes  How would you like to obtain your AVS? Juliánhart  Patient would like the video invitation sent by: Send to e-mail at: [email or web address]  Will anyone else be joining your video visit? No        Video-Visit Details    Type of service:  Video Visit    Video Start Time: 840  Video End Time: 920    Originating Location (pt. Location): Home    Distant Location (provider location):  [facility]     Platform used for Video Visit: Elena Stone RN on 7/7/2020 at 8:49 AM          "

## 2020-08-06 DIAGNOSIS — E10.65 TYPE 1 DIABETES MELLITUS WITH HYPERGLYCEMIA (H): ICD-10-CM

## 2020-08-06 RX ORDER — PEN NEEDLE, DIABETIC 32GX 5/32"
NEEDLE, DISPOSABLE MISCELLANEOUS
Qty: 250 EACH | Refills: 6 | Status: SHIPPED | OUTPATIENT
Start: 2020-08-06 | End: 2020-08-07

## 2020-08-07 DIAGNOSIS — E10.65 TYPE 1 DIABETES MELLITUS WITH HYPERGLYCEMIA (H): ICD-10-CM

## 2020-08-07 RX ORDER — PEN NEEDLE, DIABETIC 32GX 5/32"
NEEDLE, DISPOSABLE MISCELLANEOUS
Qty: 300 EACH | Refills: 6 | Status: SHIPPED | OUTPATIENT
Start: 2020-08-07 | End: 2021-03-31

## 2020-09-23 NOTE — PROGRESS NOTES
ED f/u 9/22/20 abdominal pain LM f/u 1d SUBJECTIVE:   Heidi Madden is a 18 year old female, with type 1 diabetes, who presents to clinic today because of:    Chief Complaint   Patient presents with     Abdominal Pain      HPI  Abdominal Symptoms/Constipation  Problem started: 1 months ago  Abdominal pain: YES  Fever: no  Vomiting: no  Diarrhea: no  Constipation: no  Frequency of stool: Daily most of the time.   Nausea: no  Urinary symptoms - pain or frequency: no  Therapies Tried: none  Sick contacts: None;  LMP:  01/18/2019  Click here for [place] stool scale.    Heidi presents today because of abdominal pain that has been present for over a month. She states that the pain is not severe, but she is bothered by how long she has been experiencing symptoms. This pain began initially in her right upper quadrant. It would occur only as a short burst of pain, which resolves quickly and leaves no residual pain.   Lately this pain has moved down into her right lower quadrant, she is no longer feeling pain in the upper right quadrant. The pain will occur several times a day. She notes no pattern to the time of day or in relation to food or activity. It does not wake her at night.     Heidi does not feel that her abdominal pain is related to passing gas. She does not feel she is more gassy lately.   She reports that her stools are anywhere between a 2 to a 6 on the bristol stool scale. She states that most typically they are a 3 or a 4. Usually they will be a 6 if she drinks coffee. She has passed a 2, but not within the last month.   No blood in the stool and no pain with passing BMs. Her abdominal pain does not change with passing a stool.     Heidi's diabetes has been under fairly good control lately. She makes slight adjustments to her insulin injections when needed, which is always normal. They are not running any higher or lower than normal. Usually her sugars are between lower 100's to upper 100's. Fasting is around 111.   She has had a couple of lows in the  "last month, with the lowest being around 63.     Her most recent menstrual cycle was about a week ago. This was normal. She is not taking any birth control.     Energy level and appetite are normal.      ROS  Constitutional, eye, ENT, skin, respiratory, cardiac, GI, MSK, neuro, and allergy are normal except as otherwise noted.    This document serves as a record of the services and decisions personally performed and made by Lacey Hillman MD. It was created on her behalf by Sona Munoz, a trained medical scribe. The creation of this document is based the provider's statements to the medical scribe.  Sona Munoz January 31, 2019 2:43 PM      PROBLEM LIST  Patient Active Problem List    Diagnosis Date Noted     Abnormal finding on thyroid function test 04/06/2018     Priority: Medium     Diabetes mellitus type 1 (H) 03/20/2018     Priority: Medium     Acne vulgaris 06/14/2015     Priority: Medium     Tinea versicolor 06/14/2015     Priority: Medium     Anxiety 11/17/2013     Priority: Medium     Mood swing 09/21/2013     Priority: Medium     Hematochezia 09/09/2013     Priority: Medium      MEDICATIONS  Current Outpatient Medications   Medication Sig Dispense Refill     blood glucose monitoring (ACCU-CHEK FASTCLIX) lancets Use to test blood sugar 8 times daily or as directed. 300 each 11     blood glucose monitoring (ACCU-CHEK GUIDE) test strip Use to test blood sugar 8 times daily or as directed. 250 strip 3     insulin glargine (BASAGLAR KWIKPEN) 100 UNIT/ML pen Inject 14 Units Subcutaneous daily 15 mL 3     insulin lispro (HUMALOG KWIKPEN) 100 UNIT/ML injection 1 unit per 15 grams for correction, 1 per 50 over 150 correction for hyperglycemia.  Uses up to 30 units per day 15 mL 3     Insulin Pen Needle (PEN NEEDLES 3/16\") 31G X 5 MM MISC For use with insulin pen 6 times per day 200 each 6     glucagon (GLUCAGON EMERGENCY) 1 MG kit Inject 1mg intramuscular for unconscious hypoglycemia only. (Patient not " "taking: Reported on 1/31/2019) 2 mg 1     ketoconazole (NIZORAL) 2 % shampoo APPLY TO THE AFFECTED AREA AND WASH OFF AFTER 5 MINUTES. DO THIS 3-5 X A WEEK. (Patient not taking: Reported on 11/23/2018) 120 mL 1     Urine Glucose-Ketones Test STRP Check ketones when BG > 300 x 2 or when sick/vomiting (Patient not taking: Reported on 1/31/2019) 50 each 5      ALLERGIES  Allergies   Allergen Reactions     No Known Drug Allergies        Reviewed and updated as needed this visit by clinical staff  Tobacco  Allergies  Meds         Reviewed and updated as needed this visit by Provider       OBJECTIVE:     /73 (BP Location: Right arm, Patient Position: Chair, Cuff Size: Adult Regular)   Pulse 70   Temp 99.3  F (37.4  C) (Oral)   Resp 16   Ht 5' 4.05\" (1.627 m)   Wt 125 lb (56.7 kg)   LMP 01/18/2019   SpO2 100%   BMI 21.42 kg/m    47 %ile based on CDC (Girls, 2-20 Years) Stature-for-age data based on Stature recorded on 1/31/2019.  49 %ile based on CDC (Girls, 2-20 Years) weight-for-age data based on Weight recorded on 1/31/2019.  50 %ile based on CDC (Girls, 2-20 Years) BMI-for-age based on body measurements available as of 1/31/2019.  Blood pressure percentiles are 42 % systolic and 80 % diastolic based on the August 2017 AAP Clinical Practice Guideline.    GENERAL: Active, alert, in no acute distress.  SKIN: Clear. No significant rash, abnormal pigmentation or lesions  HEAD: Normocephalic.  EYES:  No discharge or erythema. Normal pupils and EOM.  EARS: Normal canals. Tympanic membranes are normal; gray and translucent.  NOSE: Normal without discharge.  MOUTH/THROAT: Clear. No oral lesions. Teeth intact without obvious abnormalities.  NECK: Supple, no masses.  LYMPH NODES: No adenopathy  LUNGS: Clear. No rales, rhonchi, wheezing or retractions  HEART: Regular rhythm. Normal S1/S2. No murmurs.  ABDOMEN: Soft, non-tender, not distended, no masses or hepatosplenomegaly. Palpable stool from the RUQ down to the " pubic area. Bowel sounds normal.     DIAGNOSTICS: None    ASSESSMENT/PLAN:     1. Abdominal pain, right lower quadrant    2. Type 1 diabetes mellitus without complication (H)    3. Constipation, unspecified constipation type        Discussed differential diagnosis of abdominal pain.   Given the fact that Heidi's abdominal pain without any other symptoms, is very mild and brief and is staying on one side of the abdomen, and her blood sugars have been good. I am very confident that it is related not a serious medical problem such as ovarian torsion or an Appy.  She is not experiencing any other symptoms, has palpable stool in the RLQ and otherwise her exam is benign Her signs/symptoms are most consistant with constipation.   She has has a recent celiac test that was negative   I strongly encourage Heidi work to soften her stools. Increase fluids. Increase age appropriate fiber in the diet. If dietary measures are not working, then she may begin use of Miralax.   I have given due consideration to the possibility of acute abdomen, but I feel that diagnosis is unlikely based on a careful history and physical examination.   FOLLOW UP: If not improving or if worsening    The information in this document, created by the medical scribe for me, accurately reflects the services I personally performed and the decisions made by me. I have reviewed and approved this document for accuracy prior to leaving the patient care area.  January 31, 2019 3:13 PM    Lacey Hillman MD

## 2020-11-06 ENCOUNTER — TELEPHONE (OUTPATIENT)
Dept: ENDOCRINOLOGY | Facility: CLINIC | Age: 20
End: 2020-11-06

## 2020-11-06 NOTE — TELEPHONE ENCOUNTER
Heidi's 's form has been refaxed to the [employer]. Copy emailed to Heidi who was encouraged to follow up with the [employer] directly to confirm receipt. Heidi will plan to follow up.       Marzena Molina, RITAN, RN  Pediatric Diabetes Educator  [phone]

## 2020-11-16 ENCOUNTER — HEALTH MAINTENANCE LETTER (OUTPATIENT)
Age: 20
End: 2020-11-16

## 2021-01-12 ENCOUNTER — TRANSFERRED RECORDS (OUTPATIENT)
Dept: HEALTH INFORMATION MANAGEMENT | Facility: CLINIC | Age: 21
End: 2021-01-12

## 2021-01-12 LAB
RETINOPATHY: NEGATIVE
RETINOPATHY: NEGATIVE

## 2021-02-17 ENCOUNTER — MYC MEDICAL ADVICE (OUTPATIENT)
Dept: ENDOCRINOLOGY | Facility: CLINIC | Age: 21
End: 2021-02-17

## 2021-02-18 ENCOUNTER — DOCUMENTATION ONLY (OUTPATIENT)
Dept: PEDIATRICS | Facility: CLINIC | Age: 21
End: 2021-02-18

## 2021-02-19 NOTE — PROGRESS NOTES
I am not aware of which labs Heidi is due for.   These are likely being drawn for Dr Gomez.   Heidi has aged out of my practice.   It is best to contact Heidi directly and then the ordering physician.

## 2021-02-25 ENCOUNTER — DOCUMENTATION ONLY (OUTPATIENT)
Dept: ENDOCRINOLOGY | Facility: CLINIC | Age: 21
End: 2021-02-25

## 2021-02-25 DIAGNOSIS — E10.649 TYPE 1 DIABETES MELLITUS WITH HYPOGLYCEMIA AND WITHOUT COMA (H): Primary | ICD-10-CM

## 2021-03-01 DIAGNOSIS — E10.649 TYPE 1 DIABETES MELLITUS WITH HYPOGLYCEMIA AND WITHOUT COMA (H): ICD-10-CM

## 2021-03-02 ENCOUNTER — VIRTUAL VISIT (OUTPATIENT)
Dept: PEDIATRICS | Facility: CLINIC | Age: 21
End: 2021-03-02
Attending: PEDIATRICS
Payer: COMMERCIAL

## 2021-03-02 DIAGNOSIS — E10.649 TYPE 1 DIABETES MELLITUS WITH HYPOGLYCEMIA AND WITHOUT COMA (H): Primary | ICD-10-CM

## 2021-03-02 PROCEDURE — 99215 OFFICE O/P EST HI 40 MIN: CPT | Mod: GT | Performed by: PEDIATRICS

## 2021-03-02 NOTE — PATIENT INSTRUCTIONS
1. Great talking to you today, Heidi! Keep up the good work!  2. HbA1c goal for Heidi:<7%   3. Yearly labs next due: Due (they will be fasting labs). I suggest going back on a regular diet for at least 4 weeks before screening you for celiac in July 2020.    4. Changes to diabetes plan: (see updated diabetes school plan below)  5. Flu shot at vaccination given in November 2019. You are due for a flu vaccination. You can schedule a nurse visit here at [facility] to get it.    6. You had your last annual diabetes eye exam in December 2020.  7. Please continue 2000 international units of vitamin D (D3) orally daily.  8. I recommend you meet with the diabetes nurse educator to discuss considering a tandem pump.   9. Follow up in 3 months or 2 weeks after getting an insulin pump. Please feel free to contact the diabetes team sooner if you are having issues.   Here is a suggestion for an adult endocrinologist:  Zakia Fox MD  To schedule an appointment with her call:   Appointments:[phone] ([phone])     ------------------------------------------------------------------------------------------------------------------  DIABETES PLAN FOR Heidi Madden    How often to test:  Before breakfast  Before lunch  Before dinner  At bedtime  Other: with hypo- of hyperglycemia or at 2 AM if long-acting insulin doses were changed    Blood glucose goals:  Before meals and snacks:  mg/dL   At bedtime: 100-150 mg/dL     Insulin doses:  Long-acting (basal) insulin :   Lantus (Glargine): 21 units at bedtime (~ 10 pm daily) (increased from 19 units)  Meal and snack (bolus) insulin Humalog  Carbohydrate ratio :  Humalog Take 1 unit(s) per 4.5-5.5 grams carbohydrate at breakfast.  Take 1 unit(s) per 6 grams carbohydrate at lunch.  Take 1 unit(s) per 8 grams carbohydrate at dinner (changed from 1 unit per 6 g).    Sensitivity/Correction insulin :  (in addition to scheduled meal dose - to correct out-of-range blood  glucose):  1 unit per 50 over 150 mg/dL   Blood Glucose level  Humalog   151 to 200 mg/dl Give 1 unit   201 to 250 mg/dl Give 2 units   251 to 300 mg/dl Give 3 units   301 to 350 mg/dl Give 4 units   351 to 400 mg/dl Give 5 units   401 to 450 mg/dl Give 6 units   >450 mg/dl Give 7 units       Hyperglycemia (high blood glucose):  Ketones:  Check urine/blood ketones if Heidi is sick or blood glucose is above 240 twice in a row. Call parents or care team if ketones are present.  Check for ketones when sick or vomiting  Check for ketones when blood glucose is greater than 240 twice in a row. If ketones are present call your diabetes nurse or doctor.    Hypoglycemia (low blood glucose):  If blood glucose is 60 to 80:  1.  Eat or drink 1 carb unit (15 grams carbohydrate).   One carb unit equals:   - 1/2 cup (4 ounces) juice or regular soda pop, or   - 1 cup (8 ounces) milk, or   - 3 to 4 glucose tablets  2.  Re-check your blood glucose in 15 minutes.  3.  Repeat these steps every 15 minutes until your blood glucose is above 100.    If blood glucose is under 60:  1.  Eat or drink 2 carb units (30 grams carbohydrate).  Two carb units equal:   - 1 cup (8 ounces) juice or regular soda pop, or   - 2 cups (16 ounces) milk, or   - 6 to 8 glucose tablets.  2.  Re-check your blood glucose in 15 minutes.  3.  Repeat these steps every 15 minutes until your blood glucose is above 100.    Contacting a doctor or a nurse:  You may contact your diabetes nurse with any questions.   Call: Asha Archer RN, [phone]  Your Provider is: Dr. Kimberli Gomez  ADDRESS: [facility] [address], [place] [address]  Fax: [phone]. Tel: [phone].  After business hours:  Call [phone] (TTY: [phone]).  Ask to speak with an endocrinologist (diabetes doctor).  A doctor is on-call 24 hours a day.

## 2021-03-02 NOTE — NURSING NOTE
Heidi is a 20 year old who is being evaluated via a billable video visit.      How would you like to obtain your AVS? Mail a copy  If the video visit is dropped, the invitation should be resent by: Other e-mail: [email or web address]  Will anyone else be joining your video visit? No      Video Start Time:   Video-Visit Details    Type of service:  Video Visit    Video End Time:    Originating Location (pt. Location): Home    Distant Location (provider location):  [facility] [place]     Platform used for Video Visit: [employer]

## 2021-03-02 NOTE — PROGRESS NOTES
Pediatric Endocrinology Follow-up Consultation: Diabetes      Patient: Heidi Madden MRN# [id number]   YOB: 2000 Age: 20 year old   Date of Visit: Mar 2, 2021    Dear Dr. Lacey Hillman:    I had the pleasure of seeing your patient, Heidi Madden via a virtual (video) visit in the [facility] on Mar 2, 2021 for a follow-up consultation of type 1 diabetes.           Problem list:     Patient Active Problem List    Diagnosis Date Noted     Abnormal finding on thyroid function test 04/06/2018     Priority: Medium     Type 1 diabetes mellitus with hypoglycemia (H) 03/20/2018     Priority: Medium     Acne vulgaris 06/14/2015     Priority: Medium     Tinea versicolor 06/14/2015     Priority: Medium     Anxiety 11/17/2013     Priority: Medium     Mood swing 09/21/2013     Priority: Medium     Hematochezia 09/09/2013     Priority: Medium            HPI:   Heidi is a 20 year old female with Type 1 diabetes mellitus diagnosed 3/16/2018.    History was obtained from patient and electronic health record. Heidi was initially seen by Dr. Naldo Pisano in the ED on 3/16/2018, at which point her HbA1C was 10.7%, BG was 376 mg/dL and she had ketones in her urine and serum but was not acidotic. She had history of polyuria and weight loss. She was started on 15 units of Lantus (switched to Basaglar). I had the pleasure of evaluating her for the first time on 4/3/2018.     Interim History:  I conducted this virtual visit with Heidi.   Since her last clinic visit on 7/7/2020, she had been doing well overall, and has not had any hospitalizations or ED visits for diabetes, nor has she experienced any severe hypoglycemia requiring the use of glucagon.   Heidi has been having low BG levels after dinner.   She frequently checks her glucose levels at various times of the night and is not getting restful nights of sleep.    Today's concerns include: None  Date of diagnosis: 3/16/2018  Got her  Dexcom: May 2019, started wearing it in June 2019  Hypoglycemia: Heidi is having 3-4 hypoglycemic readings per week. They typically occur after dinner and just after midnight.   Hyperglycemia: Elevated BG values tend to occur following breakfast and also overnight.  It appears that these high glucose levels overnight tend to follow low glucose levels that occur in the evening.    DKA: Never.    Exercise: No organized sports.    Blood Glucose Data:       A1c:  Today s hemoglobin A1c:  Not done as this was a virtual visit.  Hemoglobin A1C   Date Value Ref Range Status   12/10/2019 5.5 0 - 5.6 % Final   09/10/2019 6.1 (A) 0 - 5.6 % Final   06/18/2019 6.1 (A) 0 - 5.6 % Final     Result was discussed at today's visit.     Current insulin regimen:   Injectable Insulin:   Lantus: 19 units at bedtime  Insulin lispro (Humalog):  Meal Coverage:   Breakfast: 1 unit per 4-5.5 gm carbohydrates (if cereal 4 or 4.5 g)  Lunch: 1 unit per 6 g  Dinner 1 unit per 6 g  Correction: 1 unit(s) per 50 mg/dL over 150    Insulin administration site(s): abdomen, thighs and arms.    I reviewed new history from the patient and the medical record.  I have reviewed previous lab results and records, patient BMI and the growth chart at today's virtual visit.  I have reviewed continuous glucose monitor (Dexcom) download.          Social History:   Heidi lives with her mother and her sister in [place], [place].  They have 1 dog. Her father is not involved. She plays the trumpet. She speaks Omani fluently and likes to watch movies.  She attends [employer] (she graduated Spring 2020) and lives at home. She attends [employer] to study Omani communication studies. She graduates in Spring 2022.    Diet: as per HPI (on and off a gluten-free diet).          Family History:     Family History   Problem Relation Age of Onset     Thyroid Disease Mother         chris'd     Family History Negative Father      Lipids Father      Psychotic Disorder Father          anger mood swings     Lipids Maternal Grandmother      Hypertension Maternal Grandfather      Diabetes Paternal Grandmother      Thyroid Disease Paternal Grandmother         hypo, partial thyroidectomy     Circulatory Paternal Grandmother         coroid arteries clogged, scraped     Heart Disease Paternal Grandfather         Angioplasty     Alcohol/Drug Paternal Grandfather      Family History Negative Sister      Family History Negative Sister      Lipids Sister         as early teen     Psychotic Disorder Other         bipolar  2 first cousins     Cancer No family hx of         sno cancer. maternal uncle     Diabetes: paternal grandmother (T2D). Maternal cousin and aunt (T2D), maternal great grandmother (is on insulin), and maternal uncle T2D.  Thyroid: Maternal grandmother and maternal aunt have hypothyroidism.   Celiac: paternal cousin was diagnosed with celiac disease (20's).    Family history was reviewed. Refer to the initial note.         Allergies:     Allergies   Allergen Reactions     No Known Drug Allergies            Medications:     Current Outpatient Medications   Medication Sig Dispense Refill     blood glucose monitoring (ONE TOUCH DELICA) lancets Use to test blood sugar 8 times daily or as directed. 300 each 11     Continuous Blood Gluc  (DEXCOM G6 ) [place] 1 each See Admin Instructions 1 Device 0     Continuous Blood Gluc Sensor (DEXCOM G6 SENSOR) MISC 3 each every 30 days 3 each 11     Continuous Blood Gluc Transmit (DEXCOM G6 TRANSMITTER) MISC 1 each every 3 months 1 each 3     Glucagon (BAQSIMI TWO PACK) 3 MG/DOSE POWD Spray 3 mg in nostril as needed (hypoglycemic siezure or unconsciousness) 1 each 1     insulin glargine (LANTUS SOLOSTAR) 100 UNIT/ML pen Inject 17 units daily. 15 mL 6     insulin lispro (HUMALOG KWIKPEN) 100 UNIT/ML (1 unit dial) KWIKPEN The patient uses up to 40 units per day. 15 mL 6     insulin lispro (HUMALOG KWIKPEN) 100 UNIT/ML pen Uses up to 40 units per  day 15 mL 6     insulin pen needle (BD KRISTA U/F) 32G X 4 MM miscellaneous For use with insulin pen 8 times per day 300 each 6     ketoconazole (NIZORAL) 2 % external shampoo Apply topically daily as needed for itching or irritation Apply to skin twice per week for 4 weeks, allow 3 days apart between applications. 120 mL 0     ONETOUCH VERIO IQ test strip Use to test blood sugar 8 times daily or as directed. 250 each 11     Urine Glucose-Ketones Test STRP Check ketones when BG > 300 x 2 or when sick/vomiting 50 each 5           Review of Systems:   Gen: Negative.   Eye: Negative.  ENT: Negative.  Pulmonary:  Negative.  Cardiovascular: Negative.  Gastrointestinal: Negative.  She is on a gluten-free diet by choice  Hematologic: Negative.  Genitourinary: Negative.  Musculoskeletal: Negative.  Psychiatric: Negative.    Neurologic: Negative.  Skin: Tinea versicolor-- she has had twice before.   Endocrine: as per above. Menarche at 13 years. Periods are regular.         Physical Exam:     Growth percentile SmartLinks can only be used for patients less than 20 years old.  Height: Data Unavailable, Facility age limit for growth percentiles is 20 years.  Weight: 0 lbs 0 oz, Facility age limit for growth percentiles is 20 years.  BMI: There is no height or weight on file to calculate BMI., No height and weight on file for this encounter.      CONSTITUTIONAL:   Awake, alert, and in no apparent distress.   EYES: sclera appears anicteric  NECK: Symmetric thyroid, not enlarged.  LUNGS/Respiratory: No increased work of breathing.  NEUROLOGIC: alert, awake, oriented.   PSYCHIATRIC: Cooperative, no agitation.        Health Maintenance:   Type 1 Diabetes, Date of Diagnosis:  3/16/2018  History of DKA (cumulative, all dates): never  History of SHE (cumulative, all dates): never    Missed days of school, related to diabetes concerns (DKA, hypoglycemia, or parental worry) excluding routine clinic appointments since last visit:   N/A    Depression screening (10 yrs of age and older):    Today's PHQ-2 Score:     PHQ-2 ( 1999 Pfizer) 12/10/2019 9/10/2019   Q1: Little interest or pleasure in doing things 0 0   Q2: Feeling down, depressed or hopeless 1 0   PHQ-2 Score 1 0     Routine Health Screening for Diabetes  Last yearly labs: As below March 2019 (celiac and lipid panel), although she had thyroid labs 11/23/2018  Last influenza vaccine: nasal flu in November 2019  Last dental visit: Julyn 2020  Last eye exam: Dec 2020        Laboratory results:     Hemoglobin A1C   Date Value Ref Range Status   12/10/2019 5.5 0 - 5.6 % Final     TSH   Date Value Ref Range Status   11/23/2018 3.52 0.40 - 4.00 mU/L Final     T4 Free   Date Value Ref Range Status   11/23/2018 0.91 0.76 - 1.46 ng/dL Final     Tissue Transglutaminase Antibody IgA   Date Value Ref Range Status   03/15/2019 <1 <7 U/mL Final     Comment:     Negative  The tTG-IgA assay has limited utility for patients with decreased levels of   IgA. Screening for celiac disease should include IgA testing to rule out   selective IgA deficiency and to guide selection and interpretation of   serological testing. tTG-IgG testing may be positive in celiac disease   patients with IgA deficiency.       Tissue Transglutaminase Patricia IgG   Date Value Ref Range Status   03/15/2019 1 <7 U/mL Final     Comment:     Negative     Cholesterol   Date Value Ref Range Status   03/15/2019 176 (H) <170 mg/dL Final     Comment:     Borderline high:  170-199 mg/dl  High:            >199 mg/dl       Triglycerides   Date Value Ref Range Status   03/15/2019 58 <90 mg/dL Final     Comment:     Fasting specimen     HDL Cholesterol   Date Value Ref Range Status   03/15/2019 55 >45 mg/dL Final     LDL Cholesterol Calculated   Date Value Ref Range Status   03/15/2019 109 <110 mg/dL Final     Comment:     Borderline high:  110-129 mg/dl  High:            >129 mg/dl       Non HDL Cholesterol   Date Value Ref Range Status    03/15/2019 121 (H) <120 mg/dL Final     Comment:     Borderline high:  120-144 mg/dl  High:            >144 mg/dl       Annual Labs:  TSH   Date Value Ref Range Status   11/23/2018 3.52 0.40 - 4.00 mU/L Final     T4 Free   Date Value Ref Range Status   11/23/2018 0.91 0.76 - 1.46 ng/dL Final     Tissue Transglutaminase Antibody IgA   Date Value Ref Range Status   03/15/2019 <1 <7 U/mL Final     Comment:     Negative  The tTG-IgA assay has limited utility for patients with decreased levels of   IgA. Screening for celiac disease should include IgA testing to rule out   selective IgA deficiency and to guide selection and interpretation of   serological testing. tTG-IgG testing may be positive in celiac disease   patients with IgA deficiency.       IGA   Date Value Ref Range Status   03/15/2019 51 (L) 70 - 380 mg/dL Final     No results found for: MICROL  No results found for: MICROALBUMIN  Creatinine   Date Value Ref Range Status   03/16/2018 0.56 0.50 - 1.00 mg/dL Final     No components found for: VID25    Diabetes Antibody Status (if checked):  No results found for: INAB, IA2ABY, IA2A, GLTA, ISCAB, [id number], [id number], INSABRIA     Component      Latest Ref Rng & Units 11/23/2018   T4 Free      0.76 - 1.46 ng/dL 0.91   TSH      0.40 - 4.00 mU/L 3.52   Thyroglobulin Antibody      <40 IU/mL <20   Thyroid Peroxidase Antibody      <35 IU/mL <10     Component      Latest Ref Rng & Units 3/15/2019   Vitamin D Deficiency screening      20 - 75 ug/L 25          Assessment and Plan:   1- Type 1 diabetes mellitus with hypoglycemia  2- Hypoglycemia unawareness  3- Vitamin D insufficiency  4-gluten-free diet by choice    Heidi is a 20 year old female with Type 1 diabetes mellitus diagnosed on 3/16/2018.  Her diabetes control is good. She continues to repeatedly have hypoglycemia after dinner.   She has fasting hyperglycemia despite getting a correction overnight.  Her download shows that her glucose levels overnight trend  upwards even when at midnight her glucose level is rated in the middle of the target range.  I therefore recommended increasing her dose of long-acting insulin especially that she is on a pretty aggressive insulin to carb ratio.  To mitigate night time hypoglycemia, I recommend weakening her insulin:Carb ratio at dinner.   I discussed the importance of prebolusing 15 to 20 minutes prior to her meals especially breakfast and dinner.  I revisited my recommendation that she consider using a closed-loop system to limit both high and low glucose levels.  I discussed the importance for her to get more sleep at night and discussed that the pump could take on many of the girls she currently does at night if she uses a closed-loop system.  I advised her to consider the control IQ technology offered by the tandem pump in conjunction with the Dexcom CGM.  She stated that she will research this and will get review somewhat me know if she changes her mind about considering an insulin pump.  Heidi has nasal glucagon (Baqsimi) at home.    She received the flu vaccinatin in November 2019.  She will schedule a nurse visit in our clinic to have the flu vaccination.  I informed her that she can have it at some of the pharmacies that offer her as well as another option.  She had her annual diabetes labs on 3/15/2019. Her celiac screen on March 2019 after being on a gluten-replete diet was negative with the caveat that her IgA level was low, which could give a false negative result. Heidi is questioning the results of her celiac screen. Given that Heidi is on a gluten-free diet, I recommend going back on a gluten-containing diet for 4 weeks then screening her for celiac disease. She is due for labs and plans to schedule a lab visit to have them drawn (will include hemoglobin A1c at the same time).    She graduates college Spring 2020. I discussed transition to an adult endocrinologist Summer 2020 if that is what she feels comfortable with.  I am happy to continue to follow her if she would like to continue to come to our clinic for a couple more years.    Patient Instructions         1. Great talking to you today, Heidi! Keep up the good work!  2. HbA1c goal for Heidi:<7%   3. Yearly labs next due: Due (they will be fasting labs). I suggest going back on a regular diet for at least 4 weeks before screening you for celiac in July 2020.    4. Changes to diabetes plan: (see updated diabetes school plan below)  5. Flu shot at vaccination given in November 2019. You are due for a flu vaccination. You can schedule a nurse visit here at [facility] to get it.    6. You had your last annual diabetes eye exam in December 2020.  7. Please continue 2000 international units of vitamin D (D3) orally daily.  8. I recommend you meet with the diabetes nurse educator to discuss considering a tandem pump.   9. Follow up in 3 months or 2 weeks after getting an insulin pump. Please feel free to contact the diabetes team sooner if you are having issues.   Here is a suggestion for an adult endocrinologist:  Zakia Fox MD  To schedule an appointment with her call:   Appointments:[phone] ([phone])     ------------------------------------------------------------------------------------------------------------------  DIABETES PLAN FOR Heidi ARAMBULA Maryanne    How often to test:  Before breakfast  Before lunch  Before dinner  At bedtime  Other: with hypo- of hyperglycemia or at 2 AM if long-acting insulin doses were changed    Blood glucose goals:  Before meals and snacks:  mg/dL   At bedtime: 100-150 mg/dL     Insulin doses:  Long-acting (basal) insulin :   Lantus (Glargine): 21 units at bedtime (~ 10 pm daily) (increased from 19 units)  Meal and snack (bolus) insulin Humalog  Carbohydrate ratio :  Humalog Take 1 unit(s) per 4.5-5.5 grams carbohydrate at breakfast.  Take 1 unit(s) per 6 grams carbohydrate at lunch.  Take 1 unit(s) per 8 grams carbohydrate at dinner  (changed from 1 unit per 6 g).    Sensitivity/Correction insulin :  (in addition to scheduled meal dose - to correct out-of-range blood glucose):  1 unit per 50 over 150 mg/dL   Blood Glucose level  Humalog   151 to 200 mg/dl Give 1 unit   201 to 250 mg/dl Give 2 units   251 to 300 mg/dl Give 3 units   301 to 350 mg/dl Give 4 units   351 to 400 mg/dl Give 5 units   401 to 450 mg/dl Give 6 units   >450 mg/dl Give 7 units       Hyperglycemia (high blood glucose):  Ketones:  Check urine/blood ketones if Heidi is sick or blood glucose is above 240 twice in a row. Call parents or care team if ketones are present.  Check for ketones when sick or vomiting  Check for ketones when blood glucose is greater than 240 twice in a row. If ketones are present call your diabetes nurse or doctor.    Hypoglycemia (low blood glucose):  If blood glucose is 60 to 80:  1.  Eat or drink 1 carb unit (15 grams carbohydrate).   One carb unit equals:   - 1/2 cup (4 ounces) juice or regular soda pop, or   - 1 cup (8 ounces) milk, or   - 3 to 4 glucose tablets  2.  Re-check your blood glucose in 15 minutes.  3.  Repeat these steps every 15 minutes until your blood glucose is above 100.    If blood glucose is under 60:  1.  Eat or drink 2 carb units (30 grams carbohydrate).  Two carb units equal:   - 1 cup (8 ounces) juice or regular soda pop, or   - 2 cups (16 ounces) milk, or   - 6 to 8 glucose tablets.  2.  Re-check your blood glucose in 15 minutes.  3.  Repeat these steps every 15 minutes until your blood glucose is above 100.    Contacting a doctor or a nurse:  You may contact your diabetes nurse with any questions.   Call: Asha Archer RN, [phone]  Your Provider is: Dr. Kimberli Gomez  ADDRESS: [facility] [address], [place] [id number]  Fax: [phone]. Tel: [phone].  After business hours:  Call [phone] (TTY: [phone]).  Ask to speak with an endocrinologist (diabetes  doctor).  A doctor is on-call 24 hours a day.    I had discussed Heidi's condition with the diabetes nurse educator today, and had independently reviewed the blood glucose downloads. Diabetes is a chronic illness with potential serious long term effects on various organs requiring intensive monitoring of therapy for safety and efficacy.       The plan had been discussed in detail with Heidi who is in agreement.  Thank you for allowing me to participate in the care of your patient.  Please do not hesitate to call with questions or concerns.    Review of the result(s) of each unique test - CGM download  40 minutes spent on the date of the encounter doing chart review, history and exam, documentation and further activities as noted above  {  Video start time: 10:05 AM  Video end time: 11: AM      Sincerely,    Elida Sinha, MS  , Pediatric Endocrinology  [facility]   Tel. [phone]  Fax [phone]    CC  Patient Care Team:  Lacey Hillman MD as Assigned PCP  Mariposa Mishra, Kimberli Savage MD as Assigned Pediatric Specialist Provider

## 2021-03-30 DIAGNOSIS — E10.649 TYPE 1 DIABETES MELLITUS WITH HYPOGLYCEMIA AND WITHOUT COMA (H): ICD-10-CM

## 2021-03-30 RX ORDER — INSULIN LISPRO 100 [IU]/ML
INJECTION, SOLUTION INTRAVENOUS; SUBCUTANEOUS
Qty: 15 ML | Refills: 6 | Status: SHIPPED | OUTPATIENT
Start: 2021-03-30 | End: 2021-03-31

## 2021-03-31 DIAGNOSIS — E10.65 TYPE 1 DIABETES MELLITUS WITH HYPERGLYCEMIA (H): ICD-10-CM

## 2021-03-31 DIAGNOSIS — E10.649 TYPE 1 DIABETES MELLITUS WITH HYPOGLYCEMIA AND WITHOUT COMA (H): ICD-10-CM

## 2021-03-31 RX ORDER — PEN NEEDLE, DIABETIC 32GX 5/32"
NEEDLE, DISPOSABLE MISCELLANEOUS
Qty: 300 EACH | Refills: 11 | Status: SHIPPED | OUTPATIENT
Start: 2021-03-31 | End: 2022-05-03

## 2021-03-31 RX ORDER — LANCETS 33 GAUGE
8 EACH MISCELLANEOUS DAILY
Qty: 300 EACH | Refills: 11 | Status: SHIPPED | OUTPATIENT
Start: 2021-03-31

## 2021-03-31 RX ORDER — PROCHLORPERAZINE 25 MG/1
1 SUPPOSITORY RECTAL
Qty: 1 EACH | Refills: 3 | Status: SHIPPED | OUTPATIENT
Start: 2021-03-31 | End: 2022-04-06

## 2021-03-31 RX ORDER — INSULIN LISPRO 100 [IU]/ML
INJECTION, SOLUTION INTRAVENOUS; SUBCUTANEOUS
Qty: 30 ML | Refills: 6 | Status: SHIPPED | OUTPATIENT
Start: 2021-03-31 | End: 2021-06-02

## 2021-03-31 RX ORDER — BLOOD SUGAR DIAGNOSTIC
STRIP MISCELLANEOUS
Qty: 250 STRIP | Refills: 11 | Status: SHIPPED | OUTPATIENT
Start: 2021-03-31 | End: 2022-04-06

## 2021-03-31 RX ORDER — GLUCAGON 3 MG/1
3 POWDER NASAL PRN
Qty: 1 EACH | Refills: 1 | Status: SHIPPED | OUTPATIENT
Start: 2021-03-31 | End: 2022-05-25

## 2021-03-31 RX ORDER — PROCHLORPERAZINE 25 MG/1
3 SUPPOSITORY RECTAL
Qty: 3 EACH | Refills: 11 | Status: SHIPPED | OUTPATIENT
Start: 2021-03-31 | End: 2022-04-06

## 2021-05-29 ENCOUNTER — HEALTH MAINTENANCE LETTER (OUTPATIENT)
Age: 21
End: 2021-05-29

## 2021-06-02 DIAGNOSIS — E10.65 TYPE 1 DIABETES MELLITUS WITH HYPERGLYCEMIA (H): ICD-10-CM

## 2021-06-02 RX ORDER — INSULIN LISPRO 100 [IU]/ML
INJECTION, SOLUTION INTRAVENOUS; SUBCUTANEOUS
Qty: 30 ML | Refills: 6 | Status: SHIPPED | OUTPATIENT
Start: 2021-06-02 | End: 2021-08-23

## 2021-09-18 ENCOUNTER — HEALTH MAINTENANCE LETTER (OUTPATIENT)
Age: 21
End: 2021-09-18

## 2021-11-13 ENCOUNTER — HEALTH MAINTENANCE LETTER (OUTPATIENT)
Age: 21
End: 2021-11-13

## 2021-12-01 ENCOUNTER — TELEPHONE (OUTPATIENT)
Dept: ENDOCRINOLOGY | Facility: CLINIC | Age: 21
End: 2021-12-01
Payer: COMMERCIAL

## 2021-12-01 ENCOUNTER — MYC MEDICAL ADVICE (OUTPATIENT)
Dept: PEDIATRICS | Facility: CLINIC | Age: 21
End: 2021-12-01
Payer: COMMERCIAL

## 2021-12-01 DIAGNOSIS — E10.649 TYPE 1 DIABETES MELLITUS WITH HYPOGLYCEMIA AND WITHOUT COMA (H): Primary | ICD-10-CM

## 2021-12-01 RX ORDER — INSULIN GLARGINE-YFGN 100 [IU]/ML
16 INJECTION, SOLUTION SUBCUTANEOUS DAILY
Qty: 15 ML | Refills: 11 | Status: SHIPPED | OUTPATIENT
Start: 2021-12-01 | End: 2022-04-06

## 2021-12-01 NOTE — TELEPHONE ENCOUNTER
The patient called me back and informed me that she's currently taking 19 units of Lantus. I discussed reducing her dose by 15-20% when starting Semglee (16 units). I placed the prescription, and verified that it is sent to the correct pharmacy.  She seemed worried about this change in formulary. I advised her to let me know if she experiences any issues or side effects with his medication. She agreed.    Elida Sinha, MS    Pediatric Endocrinology   [facility]

## 2021-12-02 ENCOUNTER — TELEPHONE (OUTPATIENT)
Dept: ENDOCRINOLOGY | Facility: CLINIC | Age: 21
End: 2021-12-02
Payer: COMMERCIAL

## 2021-12-03 NOTE — TELEPHONE ENCOUNTER
Prior Authorization Approval    Authorization Effective Date: 11/3/2021  Authorization Expiration Date: 12/3/2022  Medication: Insulin Glargine-yfgn (SEMGLEE, YFGN,) 100 UNIT/ML SOPN--APPROVED  Approved Dose/Quantity:   Reference #:     Insurance Company: [employer] - Phone [phone] Fax [phone]  Expected CoPay:       CoPay Card Available:      Foundation Assistance Needed:    Which Pharmacy is filling the prescription (Not needed for infusion/clinic administered): [facility] - [place] [address]   Pharmacy Notified: Yes  Patient Notified: Yes **Instructed pharmacy to notify patient when script is ready to /ship.**

## 2021-12-03 NOTE — TELEPHONE ENCOUNTER
PA Initiation    Medication: Insulin Glargine-yfgn (SEMGLEE, YFGN,) 100 UNIT/ML SOPN  Insurance Company: [employer] - Phone [phone] Fax [phone]  Pharmacy Filling the Rx: [facility] - [place], [place] - [facility] [address]  Filling Pharmacy Phone: [phone]  Filling Pharmacy Fax: [phone]  Start Date: 12/3/2021

## 2021-12-09 ENCOUNTER — TELEPHONE (OUTPATIENT)
Dept: ENDOCRINOLOGY | Facility: CLINIC | Age: 21
End: 2021-12-09
Payer: COMMERCIAL

## 2021-12-09 NOTE — TELEPHONE ENCOUNTER
Spoke with patient via [email or web address]. Sent in RX for Lantus, as Semglee is preferred starting 1/1.

## 2021-12-09 NOTE — TELEPHONE ENCOUNTER
[facility] Call Center    Phone Message    May a detailed message be left on voicemail: yes     Reason for Call: Other: Caller states refill for Semglee was not delivered and she is going to run out. Writer attempted diabetes priority line, no answer. Caller stated need was urgent and insisted on speaking with someone today so writer transferred to page on call provider.     Action Taken: Message routed to:  Other: Peds Diabetes [facility] - [place], [place]    Travel Screening: Not Applicable

## 2022-01-08 ENCOUNTER — HEALTH MAINTENANCE LETTER (OUTPATIENT)
Age: 22
End: 2022-01-08

## 2022-01-15 ENCOUNTER — TRANSFERRED RECORDS (OUTPATIENT)
Dept: HEALTH INFORMATION MANAGEMENT | Facility: CLINIC | Age: 22
End: 2022-01-15
Payer: COMMERCIAL

## 2022-01-27 DIAGNOSIS — E10.65 TYPE 1 DIABETES MELLITUS WITH HYPERGLYCEMIA (H): Primary | ICD-10-CM

## 2022-03-31 ENCOUNTER — MYC MEDICAL ADVICE (OUTPATIENT)
Dept: ENDOCRINOLOGY | Facility: CLINIC | Age: 22
End: 2022-03-31
Payer: COMMERCIAL

## 2022-03-31 DIAGNOSIS — E10.65 TYPE 1 DIABETES MELLITUS WITH HYPERGLYCEMIA (H): ICD-10-CM

## 2022-03-31 DIAGNOSIS — E10.649 TYPE 1 DIABETES MELLITUS WITH HYPOGLYCEMIA AND WITHOUT COMA (H): ICD-10-CM

## 2022-04-06 RX ORDER — PROCHLORPERAZINE 25 MG/1
1 SUPPOSITORY RECTAL
Qty: 1 EACH | Refills: 0 | Status: SHIPPED | OUTPATIENT
Start: 2022-04-06 | End: 2022-08-23

## 2022-04-06 RX ORDER — BLOOD SUGAR DIAGNOSTIC
STRIP MISCELLANEOUS
Qty: 750 STRIP | Refills: 0 | Status: SHIPPED | OUTPATIENT
Start: 2022-04-06 | End: 2023-03-09

## 2022-04-06 RX ORDER — INSULIN LISPRO 100 [IU]/ML
INJECTION, SOLUTION INTRAVENOUS; SUBCUTANEOUS
Qty: 70 ML | Refills: 0 | Status: SHIPPED | OUTPATIENT
Start: 2022-04-06 | End: 2022-10-17

## 2022-04-06 RX ORDER — INSULIN GLARGINE-YFGN 100 [IU]/ML
21 INJECTION, SOLUTION SUBCUTANEOUS DAILY
Qty: 30 ML | Refills: 0 | Status: SHIPPED | OUTPATIENT
Start: 2022-04-06 | End: 2022-10-18

## 2022-04-06 RX ORDER — PROCHLORPERAZINE 25 MG/1
3 SUPPOSITORY RECTAL
Qty: 9 EACH | Refills: 0 | Status: SHIPPED | OUTPATIENT
Start: 2022-04-06 | End: 2022-10-17

## 2022-05-03 DIAGNOSIS — E10.65 TYPE 1 DIABETES MELLITUS WITH HYPERGLYCEMIA (H): ICD-10-CM

## 2022-05-03 RX ORDER — PEN NEEDLE, DIABETIC 32GX 5/32"
NEEDLE, DISPOSABLE MISCELLANEOUS
Qty: 300 EACH | Refills: 0 | Status: SHIPPED | OUTPATIENT
Start: 2022-05-03 | End: 2022-06-13

## 2022-05-23 ASSESSMENT — ENCOUNTER SYMPTOMS
LEG PAIN: 0
SORE THROAT: 0
ORTHOPNEA: 0
SLEEP DISTURBANCES DUE TO BREATHING: 0
SINUS PAIN: 0
SYNCOPE: 0
EXERCISE INTOLERANCE: 0
HOARSE VOICE: 0
NECK MASS: 0
LIGHT-HEADEDNESS: 0
HYPOTENSION: 0
TROUBLE SWALLOWING: 0
PALPITATIONS: 1
TASTE DISTURBANCE: 0
SMELL DISTURBANCE: 0
SINUS CONGESTION: 0
HYPERTENSION: 0

## 2022-05-24 ENCOUNTER — LAB REQUISITION (OUTPATIENT)
Dept: LAB | Facility: CLINIC | Age: 22
End: 2022-05-24

## 2022-05-24 PROCEDURE — 86481 TB AG RESPONSE T-CELL SUSP: CPT | Performed by: INTERNAL MEDICINE

## 2022-05-25 ENCOUNTER — OFFICE VISIT (OUTPATIENT)
Dept: ENDOCRINOLOGY | Facility: CLINIC | Age: 22
End: 2022-05-25
Attending: PEDIATRICS
Payer: COMMERCIAL

## 2022-05-25 VITALS
SYSTOLIC BLOOD PRESSURE: 120 MMHG | RESPIRATION RATE: 16 BRPM | BODY MASS INDEX: 23.56 KG/M2 | DIASTOLIC BLOOD PRESSURE: 75 MMHG | HEIGHT: 65 IN | WEIGHT: 141.4 LBS | OXYGEN SATURATION: 99 % | HEART RATE: 89 BPM | TEMPERATURE: 98.1 F

## 2022-05-25 DIAGNOSIS — E10.65 TYPE 1 DIABETES MELLITUS WITH HYPERGLYCEMIA (H): Primary | ICD-10-CM

## 2022-05-25 DIAGNOSIS — E10.649 TYPE 1 DIABETES MELLITUS WITH HYPOGLYCEMIA AND WITHOUT COMA (H): ICD-10-CM

## 2022-05-25 LAB — HBA1C MFR BLD: 6.1 % (ref 0–5.6)

## 2022-05-25 PROCEDURE — 95251 CONT GLUC MNTR ANALYSIS I&R: CPT | Performed by: INTERNAL MEDICINE

## 2022-05-25 PROCEDURE — 99214 OFFICE O/P EST MOD 30 MIN: CPT | Performed by: INTERNAL MEDICINE

## 2022-05-25 PROCEDURE — 82043 UR ALBUMIN QUANTITATIVE: CPT | Performed by: INTERNAL MEDICINE

## 2022-05-25 PROCEDURE — 99207 PR FOOT EXAM NO CHARGE: CPT | Performed by: INTERNAL MEDICINE

## 2022-05-25 PROCEDURE — 82565 ASSAY OF CREATININE: CPT | Performed by: INTERNAL MEDICINE

## 2022-05-25 PROCEDURE — 84443 ASSAY THYROID STIM HORMONE: CPT | Performed by: INTERNAL MEDICINE

## 2022-05-25 PROCEDURE — 36415 COLL VENOUS BLD VENIPUNCTURE: CPT | Performed by: INTERNAL MEDICINE

## 2022-05-25 PROCEDURE — 83036 HEMOGLOBIN GLYCOSYLATED A1C: CPT | Performed by: INTERNAL MEDICINE

## 2022-05-25 RX ORDER — GLUCAGON 3 MG/1
3 POWDER NASAL PRN
Qty: 1 EACH | Refills: 1 | Status: SHIPPED | OUTPATIENT
Start: 2022-05-25 | End: 2023-06-05

## 2022-05-25 NOTE — PATIENT INSTRUCTIONS
[facility]  Dr Fox, Endocrinology Department    [facility]   [address] [facility]  [place] [address]  Appointment [phone]  Fax: [phone]  [place]: Monday - Thursday      Please check the cost coverage and copay with insurance before recommended tests, services and medications (especially if new medications are prescribed).     If ordered, please get blood work done 1 week prior to your next appointment so they will be available to Dr. Fox at your visit.    To provide the best diabetic care, please bring your blood glucose meter to each and every visit with your  Endocrinologist. Your blood glucose CGM/meter/insulin pump will be downloaded at every appointment.  Please arrive 15 minutes before your scheduled appointment. This will allow for your blood glucose CGM/meter/insulin pump  to be downloaded.  If you are wearing DEXCOM please bring  or sharing code from the Dexcom Clarity Gonzalez so that it can be downloaded.  If you are using [employer] personal sensors please bring the reader.      Labs today.  Continue current insulin regimen.  Avoid snacking at night.  Repeat labs and follow up in 3 months.  Continue to use dexcom.    Recommend checking blood sugars before meals and at bedtime.    If Blood glucose are low more often-> 2-3 times/week- give us a call.  Make better food choices: reduce carbs, Reduce portion size, weight loss and exercise 3-4 times a week.    What is hypoglycemia:  Hypoglycemia is when blood sugar levels become too low - below 70 m/dl.      What causes hypoglycemia?  - using too much insulin  -taking too many diabetes pills  -not eating enough, or skipping meals or snacks  -not eating enough carbohydrate with meals  -changing your exercise routine  -drinking alcohol in excess    It is also possible to have hypoglycemia even when you are carefully managing your blood sugar levels.    What does it feel like  when blood sugars get too low?  You may feel:  - anxious  -confused  -dizzy  -hungry  -light-headed  -nervous  -shaky  -sleepy  -sweaty    You may have  -blurred or cloudy vision  -heart palpitations (heart skips a beat or races)  -tingling or numbness around the mouth and tongue  -tremors    What to do if you have symptoms of hypoglycmemia:  If you think your blood sugar is too low, check it with a glucose meter.  If its below 70 mg/dl, consume one of the following:  Fruit juice (1/2 cup)  Glucose tablets (15 grams)  Hard candy (5 to 7 pieces)  Honey or sugar (2 teaspoons)  Milk (1/2 cup)  Soft drink (non-diet, 1/2 cup)    Wait 15 minutes and check your blood glucose again.  IF it is still below 70 mg/dl, have another food item listed above. Wait another 15 minutes and repeat the blood glucose test.  Have a small meal or snack that contains some carbohydrate after your blood glucose rises above 70 mg/dl.    If you are at risk of hypoglycemia, always carry with you glucose tablets or one of the foods listed above.      To prevent Hypoglycemia:  Avoid situations that may cause hypoglycemia  Before making any change to your diet or exercise routine, discuss them with your healthcare provider  Keep a record of your blood glucose levels.  Include the time of day, diabetes medications, when you had your last meal or snack, and what you were doing at the time (e.g. Watching TV, gardening, jogging, etc).    Talk to your healthcare provider if your blood glucose levels are often low        Patient guide on hypoglycemia    http://www.hormone.org/Resources/upload/patient-guide-diagnosis-and-management-hypoglycemia-417254.[email or web address]

## 2022-05-25 NOTE — LETTER
2022         RE: Heidi Madden  [address] [place] [address]        Dear Colleague,    Thank you for referring your patient, Heidi Madden, to the [facility]. Please see a copy of my visit note below.    Endocrinology Clinic Note:  Name: Heidi Madden  Seen in consultation with  for Diabetes.  HPI:  Heidi Madden is a 21 year old female who presents for the evaluation/management of type 1 diabetes.  Was seen by pediatric endocrinologist  before.    Using MDI and Dexcom.  She is not interested in pump at this time.    She graduated this year and will be starting job at [employer].  Living with mother and sister.    She reports that currently she is going to bed at night and sometimes snacking at night.  As she starts new job [employer] will be more structured and she will going to bed at a later time.  Noted to have slightly high blood sugars overnight on the days when she is going to bed late or having snacks at night.       1. Type 1 DM:  Orginally diagnosed: 3/16/2018  Hospitalization for DKA: no  Current Regimen:   Semglee: 20 units/day  Humalo unit/ 5 gm of Carbs + sliding scale insulin 1 unit 50 > 150.  Typically takes about 10-20 TID.    yes:     Diabetes Medication(s)     Diabetic Other       Glucagon (BAQSIMI TWO PACK) 3 MG/DOSE POWD    Spray 3 mg in nostril as needed (hypoglycemic siezure or unconsciousness)    Insulin       Insulin Glargine-yfgn (SEMGLEE, YFGN,) 100 UNIT/ML SOPN    Inject 21 Units Subcutaneous daily     insulin lispro (HUMALOG KWIKPEN) 100 UNIT/ML (1 unit dial) KWIKPEN    The patient uses up to 70 units per day.          BS checks: dexcom  Average Meter Download: Blood glucose data reviewed personally. See nursing note from this encounter for details.  Blood sugars are mostly in acceptable range.  Exercise: walks/ bike ride everyday.  Last A1c:  Symptoms of hypoglycemia (low blood sugar): yes  Using PUMP:      DM  Complications:   Complications:   Diabetes Complications  Description / Detail    Diabetic Retinopathy  No   CAD / PAD  No   Neuropathy  No   Nephropathy / Microalbuminuria  No  No results found for: UMALCR      Gastroparesis  No   Hypoglycemia [place]  No          2. Hypertension: Blood Pressure today:   BP Readings from Last 3 Encounters:   05/25/22 120/75   12/10/19 123/81   11/14/19 106/73   .     3. Hyperlipidemia:  On medication    PMH/PSH:  History reviewed. No pertinent past medical history.  Past Surgical History:   Procedure Laterality Date     C REIMPLANT URETER,VESICOPSOAS HITCH  01/05     Family Hx:  Family History   Problem Relation Age of Onset     Thyroid Disease Mother         hashimoyoto'd     Family History Negative Father      Lipids Father      Psychotic Disorder Father         anger mood swings     Lipids Maternal Grandmother      Hypertension Maternal Grandfather      Diabetes Paternal Grandmother      Thyroid Disease Paternal Grandmother         hypo, partial thyroidectomy     Circulatory Paternal Grandmother         coroid arteries clogged, scraped     Heart Disease Paternal Grandfather         Angioplasty     Alcohol/Drug Paternal Grandfather      Family History Negative Sister      Family History Negative Sister      Lipids Sister         as early teen     Psychotic Disorder Other         bipolar  2 first cousins     Cancer No family hx of         sno cancer. maternal uncle           DM2: aunt, uncle and pgm.           Social Hx:  Social History     Socioeconomic History     Marital status: Single     Spouse name: Not on file     Number of children: Not on file     Years of education: Not on file     Highest education level: Not on file   Occupational History     Not on file   Tobacco Use     Smoking status: Never Smoker     Smokeless tobacco: Never Used     Tobacco comment: No one in family smokes.   Substance and Sexual Activity     Alcohol use: No     Drug use: No     Sexual  "activity: Never     Comment: constantine march 2018   Other Topics Concern     Not on file   Social History Narrative     Not on file     Social Determinants of Health     Financial Resource Strain: Not on file   Food Insecurity: Not on file   Transportation Needs: Not on file   Physical Activity: Not on file   Stress: Not on file   Social Connections: Not on file   Intimate Partner Violence: Not on file   Housing Stability: Not on file          MEDICATIONS:  has a current medication list which includes the following prescription(s): dexcom g6 , dexcom g6 sensor, dexcom g6 transmitter, baqsimi two pack, insulin glargine-yfgn, insulin lispro, bd lynda u/f, onetouch delica lancets 33g, onetouch verio iq, and urine glucose-ketones test.    ROS     ROS: 10 point ROS neg other than the symptoms noted above in the HPI.    Physical Exam   VS: /75 (BP Location: Left arm, Patient Position: Chair, Cuff Size: Adult Regular)   Pulse 89   Temp 98.1  F (36.7  C) (Oral)   Resp 16   Ht 1.638 m (5' 4.5\")   Wt 64.1 kg (141 lb 6.4 oz)   LMP 04/30/2022   SpO2 99%   Breastfeeding No   BMI 23.90 kg/m    GENERAL: AXOX3, NAD, well dressed, answering questions appropriately, appears stated age.  HEENT: No exopthalmous, no proptosis, EOMI, no lig lag, no retraction  NECK: Thyroid normal in size, non tender, no nodules were palpated.  No lymphadenopathy.  CV: RRR  LUNGS: CTAB, no wheezing  ABDOMEN: non distended, +BS  NEUROLOGY: CN grossly intact, no tremors  DM FOOT EXAM: normal DP and PT pulses, no trophic changes or ulcerative lesions, normal sensory exam and normal monofilament exam.   MSK: grossly intact, no swelling, + pulses.  SKIN: no rashes, no lesions  PSYCH: normal affect and mood      LABS:  A1c:  Lab Results   Component Value Date    A1C 5.5 12/10/2019    A1C 6.1 09/10/2019    A1C 6.1 06/18/2019    A1C 5.7 02/19/2019    A1C 6.0 11/20/2018       BMP:  Creatinine   Date Value Ref Range Status   03/16/2018 0.56 0.50 - " 1.00 mg/dL Final       Urine Micro:  No results found for: MICROL  No results found for: MICROALBUMIN      LFTs/Lipids:  Recent Labs   Lab Test 03/15/19  0909   CHOL 176*   HDL 55      TRIG 58       TFTs:  Lab Results   Component Value Date    TSH 3.52 11/23/2018       Blood Glucose and pump data/ Meter reviewed.     All pertinent notes, labs, and images personally reviewed by me.       Glucometer/ insulin pump (if applicable)/ CGM data (if applicable) downloaded, Personally reviewed and interpreted.  All Blood sugar data reviewed personally and interpreted as well as discussed with pt.  See nursing note from 5/25/2022 for details of BG/CGM log.  Where applicable   All past medical, social and Family history reviewed and updated in [facility].    A/P  Ms.Abby TYESHA Madden is a 21 year old here for the evaluation/management of diabetes:    1. DM1 - Under Fair control.  A1c 5.1%.  No known diabetes related complications.  No recent A1c to review.  She is using multiple injections plus dexcom.  She is not interested in insulin pump at this time.  In general blood sugars are in acceptable range.  Plan:  Discussed diagnosis, pathophysiology, management and treatment options of condition with pt.  I also discussed importance of strict blood sugar control to prevent complications associated with uncontrolled diabetes.    Labs today.  Continue current insulin regimen.  Avoid snacking at night.  Repeat labs and follow up in 3 months.  Continue to use dexcom.    Plan: SC FOOT EXAM NO CHARGE, ASPIRIN NOT PRESCRIBED         (INTENTIONAL), STATIN NOT PRESCRIBED         (INTENTIONAL), Hemoglobin A1c, Albumin Random         Urine Quantitative with Creat Ratio, Glucagon         (BAQSIMI TWO PACK) 3 MG/DOSE POWD, Creatinine,         TSH with free T4 reflex         2. Hypertension - Under Good  control. Not on medication.      3. Hyperlipidemia - Under Good control. Not on medication.  4. Prevention  Ophthalmology-recommend  annually  ASA-not indicated secondary to age  Smoking-non-smoker    Foot exam- 5/25/2022      Most Recent Immunizations   Administered Date(s) Administered     COVID-19,PF,Moderna 05/18/2021     Comvax (HIB/HepB) 07/11/2001     DTAP (<7y) 06/29/2004     HEPA 03/26/2013     HPV 03/26/2013     Influenza Intranasal Vaccine 09/21/2013     Influenza Intranasal Vaccine 4 valent (FluMist) 11/23/2018     Influenza Vaccine IM > 6 months Valent IIV4 (Alfuria,Fluzone) 03/27/2017     Influenza Vaccine, 6+MO IM (QUADRIVALENT W/PRESERVATIVES) 10/15/2019     MMR 06/29/2004     Meningococcal (Menactra ) 07/11/2016     Pneumo Conj 13-V (2010&after) 11/14/2019     Pneumococcal (PCV 7) 07/11/2001     Pneumococcal 23 valent 01/16/2020     Poliovirus, inactivated (IPV) 06/29/2004     TDAP Vaccine (Boostrix) 06/27/2011     Typhoid Oral 03/27/2017     Varicella 06/27/2011         Recommend checking blood sugars before meals and at bedtime.    If Blood glucose are low more often-> 2-3 times/week- give us a call.  The patient is advised to Make better food choices: reduce carbs, Reduce portion size, weight loss and exercise 3-4 times a week.  Discussed hypoglycemia signs and symptoms as well as management in detail.      There is some variability among people, most will usually develop symptoms suggestive of hypoglycemia when blood glucose levels are lowered to the mid 60's. The first set of symptoms are called adrenergic. Patients may experience any of the following nervousness, sweating, intense hunger, trembling, weakness, palpitations, and difficulty speaking. When BS fall below 50 the patient is unable to talk and take oral therapy.  Would recommend Glucagon emergency kit for the patient and education for family and friends around the patient.   The acute management of hypoglycemia involves the rapid delivery of a source of easily absorbed sugar. Regular soda, juice, lifesavers, table sugar, are good options. 15 grams of glucose is the  dose that is given, followed by an assessment of symptoms and a blood glucose check if possible. If after 10 minutes there is no improvement, another 10-15 grams should be given. This can be repeated up to three times. The equivalency of 10-15 grams of glucose (approximate servings) are: Four lifesavers, 4 teaspoons of sugar, or 1/2 cup or 4 oz of juice or regular pop.      Medications Discontinued During This Encounter   Medication Reason     insulin glargine (LANTUS SOLOSTAR) 100 UNIT/ML pen      ketoconazole (NIZORAL) 2 % external shampoo      insulin lispro (HUMALOG KWIKPEN) 100 UNIT/ML pen      blood glucose monitoring (ONE TOUCH DELICA) lancets        Follow-up:  3 months.    Zakia Fox M.D  Endocrinology  [facility]/[place]  CC: No Ref-Primary, Physician      All questions were answered.  The patient indicates understanding of the above issues and agrees with the plan set forth.     Disclaimer: This note consists of symbols derived from keyboarding, dictation and/or voice recognition software. As a result, there may be errors in the script that have gone undetected. Please consider this when interpreting information found in this chart.    Addendum to above note and clinic visit:    Labs reviewed.    See result note/telephone encounter.            Again, thank you for allowing me to participate in the care of your patient.        Sincerely,        Zakia Fox MD

## 2022-05-25 NOTE — NURSING NOTE
ENDOCRINOLOGY INTAKE FORM    Patient Name:  Heidi Madden  :  2000    Is patient Diabetic?   Yes: type 1  Does patient have non-diabetic or other endocrine issues?  Yes:   Abnormal finding on thyroid function test    Vitals: There were no vitals taken for this visit.  BMI= There is no height or weight on file to calculate BMI.    Flu vaccine:  No  Pneumonia vaccine:  Yes: 13 x 1, 23 x 1    Smoking and Alcohol use:  Social History     Tobacco Use     Smoking status: Never Smoker     Smokeless tobacco: Never Used     Tobacco comment: No one in family smokes.   Substance Use Topics     Alcohol use: No     Drug use: No       Foot Exam: No  Eye Exam:  Yes: 01/15/22  Dental Exam:  Yes: 2022  Aspirin Use:  No    Lab Results   Component Value Date    A1C 5.5 12/10/2019    A1C 6.1 09/10/2019    A1C 6.1 2019    A1C 5.7 2019    A1C 6.0 2018       No results found for: MICROL  No results found for: MICROALBUMIN    Nydia Kline CMA  [facility] Endocrinology [place]  [phone]

## 2022-05-25 NOTE — PROGRESS NOTES
Endocrinology Clinic Note:  Name: Heidi Madden  Seen in consultation with  for Diabetes.  HPI:  Heidi Madden is a 21 year old female who presents for the evaluation/management of type 1 diabetes.  Was seen by pediatric endocrinologist  before.    Using MDI and Dexcom.  She is not interested in pump at this time.    She graduated this year and will be starting job at [employer].  Living with mother and sister.    She reports that currently she is going to bed at night and sometimes snacking at night.  As she starts new job [employer] will be more structured and she will going to bed at a later time.  Noted to have slightly high blood sugars overnight on the days when she is going to bed late or having snacks at night.       1. Type 1 DM:  Orginally diagnosed: 3/16/2018  Hospitalization for DKA: no  Current Regimen:   Semglee: 20 units/day  Humalo unit/ 5 gm of Carbs + sliding scale insulin 1 unit 50 > 150.  Typically takes about 10-20 TID.    yes:     Diabetes Medication(s)     Diabetic Other       Glucagon (BAQSIMI TWO PACK) 3 MG/DOSE POWD    Spray 3 mg in nostril as needed (hypoglycemic siezure or unconsciousness)    Insulin       Insulin Glargine-yfgn (SEMGLEE, YFGN,) 100 UNIT/ML SOPN    Inject 21 Units Subcutaneous daily     insulin lispro (HUMALOG KWIKPEN) 100 UNIT/ML (1 unit dial) KWIKPEN    The patient uses up to 70 units per day.          BS checks: dexcom  Average Meter Download: Blood glucose data reviewed personally. See nursing note from this encounter for details.  Blood sugars are mostly in acceptable range.  Exercise: walks/ bike ride everyday.  Last A1c:  Symptoms of hypoglycemia (low blood sugar): yes  Using PUMP:      DM Complications:   Complications:   Diabetes Complications  Description / Detail    Diabetic Retinopathy  No   CAD / PAD  No   Neuropathy  No   Nephropathy / Microalbuminuria  No  No results found for: UMALCR      Gastroparesis  No   Hypoglycemia  [place]ar[place]  No          2. Hypertension: Blood Pressure today:   BP Readings from Last 3 Encounters:   05/25/22 120/75   12/10/19 123/81   11/14/19 106/73   .     3. Hyperlipidemia:  On medication    PMH/PSH:  History reviewed. No pertinent past medical history.  Past Surgical History:   Procedure Laterality Date     ZZC REIMPLANT URETER,VESICOPSOAS HITCH  01/05     Family Hx:  Family History   Problem Relation Age of Onset     Thyroid Disease Mother         hashimoyoto'd     Family History Negative Father      Lipids Father      Psychotic Disorder Father         anger mood swings     Lipids Maternal Grandmother      Hypertension Maternal Grandfather      Diabetes Paternal Grandmother      Thyroid Disease Paternal Grandmother         hypo, partial thyroidectomy     Circulatory Paternal Grandmother         coroid arteries clogged, scraped     Heart Disease Paternal Grandfather         Angioplasty     Alcohol/Drug Paternal Grandfather      Family History Negative Sister      Family History Negative Sister      Lipids Sister         as early teen     Psychotic Disorder Other         bipolar  2 first cousins     Cancer No family hx of         sno cancer. maternal uncle           DM2: aunt, uncle and pgm.           Social Hx:  Social History     Socioeconomic History     Marital status: Single     Spouse name: Not on file     Number of children: Not on file     Years of education: Not on file     Highest education level: Not on file   Occupational History     Not on file   Tobacco Use     Smoking status: Never Smoker     Smokeless tobacco: Never Used     Tobacco comment: No one in family smokes.   Substance and Sexual Activity     Alcohol use: No     Drug use: No     Sexual activity: Never     Comment: constantine march 2018   Other Topics Concern     Not on file   Social History Narrative     Not on file     Social Determinants of Health     Financial Resource Strain: Not on file   Food Insecurity: Not on file   Transportation  "Needs: Not on file   Physical Activity: Not on file   Stress: Not on file   Social Connections: Not on file   Intimate Partner Violence: Not on file   Housing Stability: Not on file          MEDICATIONS:  has a current medication list which includes the following prescription(s): dexcom g6 , dexcom g6 sensor, dexcom g6 transmitter, baqsimi two pack, insulin glargine-yfgn, insulin lispro, bd lynda u/f, onetouch delica lancets 33g, onetouch verio iq, and urine glucose-ketones test.    ROS     ROS: 10 point ROS neg other than the symptoms noted above in the HPI.    Physical Exam   VS: /75 (BP Location: Left arm, Patient Position: Chair, Cuff Size: Adult Regular)   Pulse 89   Temp 98.1  F (36.7  C) (Oral)   Resp 16   Ht 1.638 m (5' 4.5\")   Wt 64.1 kg (141 lb 6.4 oz)   LMP 04/30/2022   SpO2 99%   Breastfeeding No   BMI 23.90 kg/m    GENERAL: AXOX3, NAD, well dressed, answering questions appropriately, appears stated age.  HEENT: No exopthalmous, no proptosis, EOMI, no lig lag, no retraction  NECK: Thyroid normal in size, non tender, no nodules were palpated.  No lymphadenopathy.  CV: RRR  LUNGS: CTAB, no wheezing  ABDOMEN: non distended, +BS  NEUROLOGY: CN grossly intact, no tremors  DM FOOT EXAM: normal DP and PT pulses, no trophic changes or ulcerative lesions, normal sensory exam and normal monofilament exam.   MSK: grossly intact, no swelling, + pulses.  SKIN: no rashes, no lesions  PSYCH: normal affect and mood      LABS:  A1c:  Lab Results   Component Value Date    A1C 5.5 12/10/2019    A1C 6.1 09/10/2019    A1C 6.1 06/18/2019    A1C 5.7 02/19/2019    A1C 6.0 11/20/2018       BMP:  Creatinine   Date Value Ref Range Status   03/16/2018 0.56 0.50 - 1.00 mg/dL Final       Urine Micro:  No results found for: MICROL  No results found for: MICROALBUMIN      LFTs/Lipids:  Recent Labs   Lab Test 03/15/19  0909   CHOL 176*   HDL 55      TRIG 58       TFTs:  Lab Results   Component Value Date    " TSH 3.52 11/23/2018       Blood Glucose and pump data/ Meter reviewed.     All pertinent notes, labs, and images personally reviewed by me.       Glucometer/ insulin pump (if applicable)/ CGM data (if applicable) downloaded, Personally reviewed and interpreted.  All Blood sugar data reviewed personally and interpreted as well as discussed with pt.  See nursing note from 5/25/2022 for details of BG/CGM log.  Where applicable   All past medical, social and Family history reviewed and updated in [facility].    A/P  Ms.Abby TYESHA Madden is a 21 year old here for the evaluation/management of diabetes:    1. DM1 - Under Fair control.  A1c 5.1%.  No known diabetes related complications.  No recent A1c to review.  She is using multiple injections plus dexcom.  She is not interested in insulin pump at this time.  In general blood sugars are in acceptable range.  Plan:  Discussed diagnosis, pathophysiology, management and treatment options of condition with pt.  I also discussed importance of strict blood sugar control to prevent complications associated with uncontrolled diabetes.    Labs today.  Continue current insulin regimen.  Avoid snacking at night.  Repeat labs and follow up in 3 months.  Continue to use dexcom.    Plan: IA FOOT EXAM NO CHARGE, ASPIRIN NOT PRESCRIBED         (INTENTIONAL), STATIN NOT PRESCRIBED         (INTENTIONAL), Hemoglobin A1c, Albumin Random         Urine Quantitative with Creat Ratio, Glucagon         (BAQSIMI TWO PACK) 3 MG/DOSE POWD, Creatinine,         TSH with free T4 reflex         2. Hypertension - Under Good  control. Not on medication.      3. Hyperlipidemia - Under Good control. Not on medication.  4. Prevention  Ophthalmology-recommend annually  ASA-not indicated secondary to age  Smoking-non-smoker    Foot exam- 5/25/2022      Most Recent Immunizations   Administered Date(s) Administered     COVID-19,PF,Moderna 05/18/2021     Comvax (HIB/HepB) 07/11/2001     DTAP (<7y) 06/29/2004     HEPA  03/26/2013     HPV 03/26/2013     Influenza Intranasal Vaccine 09/21/2013     Influenza Intranasal Vaccine 4 valent (FluMist) 11/23/2018     Influenza Vaccine IM > 6 months Valent IIV4 (Alfuria,Fluzone) 03/27/2017     Influenza Vaccine, 6+MO IM (QUADRIVALENT W/PRESERVATIVES) 10/15/2019     MMR 06/29/2004     Meningococcal (Menactra ) 07/11/2016     Pneumo Conj 13-V (2010&after) 11/14/2019     Pneumococcal (PCV 7) 07/11/2001     Pneumococcal 23 valent 01/16/2020     Poliovirus, inactivated (IPV) 06/29/2004     TDAP Vaccine (Boostrix) 06/27/2011     Typhoid Oral 03/27/2017     Varicella 06/27/2011         Recommend checking blood sugars before meals and at bedtime.    If Blood glucose are low more often-> 2-3 times/week- give us a call.  The patient is advised to Make better food choices: reduce carbs, Reduce portion size, weight loss and exercise 3-4 times a week.  Discussed hypoglycemia signs and symptoms as well as management in detail.      There is some variability among people, most will usually develop symptoms suggestive of hypoglycemia when blood glucose levels are lowered to the mid 60's. The first set of symptoms are called adrenergic. Patients may experience any of the following nervousness, sweating, intense hunger, trembling, weakness, palpitations, and difficulty speaking. When BS fall below 50 the patient is unable to talk and take oral therapy.  Would recommend Glucagon emergency kit for the patient and education for family and friends around the patient.   The acute management of hypoglycemia involves the rapid delivery of a source of easily absorbed sugar. Regular soda, juice, lifesavers, table sugar, are good options. 15 grams of glucose is the dose that is given, followed by an assessment of symptoms and a blood glucose check if possible. If after 10 minutes there is no improvement, another 10-15 grams should be given. This can be repeated up to three times. The equivalency of 10-15 grams of  glucose (approximate servings) are: Four lifesavers, 4 teaspoons of sugar, or 1/2 cup or 4 oz of juice or regular pop.      Medications Discontinued During This Encounter   Medication Reason     insulin glargine (LANTUS SOLOSTAR) 100 UNIT/ML pen      ketoconazole (NIZORAL) 2 % external shampoo      insulin lispro (HUMALOG KWIKPEN) 100 UNIT/ML pen      blood glucose monitoring (ONE TOUCH DELICA) lancets        Follow-up:  3 months.    Zakia Fox M.D  Endocrinology  [facility]/[place]  CC: No Ref-Primary, Physician      All questions were answered.  The patient indicates understanding of the above issues and agrees with the plan set forth.     Disclaimer: This note consists of symbols derived from keyboarding, dictation and/or voice recognition software. As a result, there may be errors in the script that have gone undetected. Please consider this when interpreting information found in this chart.    Addendum to above note and clinic visit:    Labs reviewed.    See result note/telephone encounter.

## 2022-05-26 LAB
CREAT SERPL-MCNC: 0.72 MG/DL (ref 0.52–1.04)
CREAT UR-MCNC: 22 MG/DL
GAMMA INTERFERON BACKGROUND BLD IA-ACNC: 0.12 IU/ML
GFR SERPL CREATININE-BSD FRML MDRD: >90 ML/MIN/1.73M2
M TB IFN-G BLD-IMP: NEGATIVE
M TB IFN-G CD4+ BCKGRND COR BLD-ACNC: 9.88 IU/ML
MICROALBUMIN UR-MCNC: <5 MG/L
MICROALBUMIN/CREAT UR: NORMAL MG/G{CREAT}
MITOGEN IGNF BCKGRD COR BLD-ACNC: 0.01 IU/ML
MITOGEN IGNF BCKGRD COR BLD-ACNC: 0.03 IU/ML
QUANTIFERON MITOGEN: 10 IU/ML
QUANTIFERON NIL TUBE: 0.12 IU/ML
QUANTIFERON TB1 TUBE: 0.15 IU/ML
QUANTIFERON TB2 TUBE: 0.13
TSH SERPL DL<=0.005 MIU/L-ACNC: 1.82 MU/L (ref 0.4–4)

## 2022-05-26 NOTE — RESULT ENCOUNTER NOTE
Heidi    Recently done endocrinology lab test/ imaging test showed:  Lab Results       Component                Value               Date                       A1C                      6.1                 05/25/2022                 A1C                      5.5                 12/10/2019                 Other labs are pending.              Here is a copy for your records.    Follow up as discussed in last clinic visit.    Please call endocrinology clinic (nurse line: [phone]) if questions.    Zakia Fox MD  Endocrinology   [facility]/[place]  May 25, 2022

## 2022-05-28 NOTE — RESULT ENCOUNTER NOTE
Heidi    Recently done endocrinology lab test/ imaging test showed:  Lab Results       Component                Value               Date                       A1C                      6.1                 05/25/2022                 A1C                      5.5                 12/10/2019              Other labs are in acceptable range.  Here is a copy for your records.    Follow up as discussed in last clinic visit.    Please call endocrinology clinic (nurse line: [phone]) if questions.    Zakia Fox MD  Endocrinology   [facility]/[place]  May 27, 2022

## 2022-06-10 DIAGNOSIS — E10.65 TYPE 1 DIABETES MELLITUS WITH HYPERGLYCEMIA (H): ICD-10-CM

## 2022-06-13 RX ORDER — PEN NEEDLE, DIABETIC 32GX 5/32"
NEEDLE, DISPOSABLE MISCELLANEOUS
Qty: 300 EACH | Refills: 0 | OUTPATIENT
Start: 2022-06-13

## 2022-06-13 RX ORDER — PEN NEEDLE, DIABETIC 32GX 5/32"
NEEDLE, DISPOSABLE MISCELLANEOUS
Qty: 300 EACH | Refills: 0 | Status: SHIPPED | OUTPATIENT
Start: 2022-06-13 | End: 2022-07-25

## 2022-07-15 ENCOUNTER — TELEPHONE (OUTPATIENT)
Dept: ENDOCRINOLOGY | Facility: CLINIC | Age: 22
End: 2022-07-15

## 2022-07-15 NOTE — TELEPHONE ENCOUNTER
[facility] Call Center    Phone Message    May a detailed message be left on voicemail: yes     Reason for Call: Form or Letter   Type or form/letter needing completion: Patient requesting a note to be sent to her employer that she is she is immune compromised  Due to diabetes and may need more time to quarantine after testing positive for Covid-19/ [place]lo..Patient  would like to speak to care team about some of her symptoms as they relate to her diabetes and is requesting a call back . Thank you     Provider: Dr. Fox    Date form needed: asap     Once completed: Fax form to: [phone]      Action Taken: Other: ENDO    Travel Screening: Not Applicable

## 2022-07-15 NOTE — TELEPHONE ENCOUNTER
I called the pt, she works at the [employer] and tested positive for COVID yesterday. The pt is wondering if she needs to quarantine longer then usual due to having diabetes and being immunocompromised from this. The letter can be faxed to [phone]. Pt denies any further questions.

## 2022-07-16 NOTE — TELEPHONE ENCOUNTER
To my knowledge there is no medical literature to suggest that she needs more days of quarantine.  So recommend regular protocol.  If she is symptomatic then she may need to seek medical care.

## 2022-07-20 DIAGNOSIS — E10.65 TYPE 1 DIABETES MELLITUS WITH HYPERGLYCEMIA (H): ICD-10-CM

## 2022-07-25 RX ORDER — PEN NEEDLE, DIABETIC 32GX 5/32"
NEEDLE, DISPOSABLE MISCELLANEOUS
Qty: 300 EACH | Refills: 0 | Status: SHIPPED | OUTPATIENT
Start: 2022-07-25 | End: 2022-08-01

## 2022-08-01 ENCOUNTER — TELEPHONE (OUTPATIENT)
Dept: ENDOCRINOLOGY | Facility: CLINIC | Age: 22
End: 2022-08-01

## 2022-08-01 DIAGNOSIS — E10.65 TYPE 1 DIABETES MELLITUS WITH HYPERGLYCEMIA (H): ICD-10-CM

## 2022-08-01 RX ORDER — PEN NEEDLE, DIABETIC 32GX 5/32"
NEEDLE, DISPOSABLE MISCELLANEOUS
Qty: 800 EACH | Refills: 1 | Status: SHIPPED | OUTPATIENT
Start: 2022-08-01 | End: 2022-08-03

## 2022-08-01 NOTE — TELEPHONE ENCOUNTER
[facility] Call Center    Phone Message    May a detailed message be left on voicemail: yes     Reason for Call: Medication Question or concern regarding medication   Prescription Clarification    Name of Medication:   * insulin pen needle (BD KRISTA U/F) 32G X 4 MM miscellaneous      Prescribing Provider: Zakia Fox     Pharmacy: [facility]     What on the order needs clarification? Per Patient states [facility] told patient that the prescription is needing to be transferred over to them by Dr. Fox. Patient is needing a refill and needing a new prescription sent over. Please advise.           Action Taken: Message routed to:  [facility] Endo    Travel Screening: Not Applicable

## 2022-08-01 NOTE — TELEPHONE ENCOUNTER
LM for patient to verify which pharmacy she wanted her prescription that was prescribed today sent to. On the order it says [facility] but I see that she's been filling with [facility] for the last few months. Ran a test claim, her insurance pays towards the claim. Still [facility] eligible.

## 2022-08-02 NOTE — TELEPHONE ENCOUNTER
Patient is calling back to confirm she is using [facility], and would like another call back informing her it has been sent to them.  Thank you

## 2022-08-03 RX ORDER — PEN NEEDLE, DIABETIC 32GX 5/32"
NEEDLE, DISPOSABLE MISCELLANEOUS
Qty: 800 EACH | Refills: 1 | Status: SHIPPED | OUTPATIENT
Start: 2022-08-03 | End: 2023-01-12

## 2022-08-03 NOTE — TELEPHONE ENCOUNTER
Please send script to [facility] as soon as possible and notify patient when you do. Thank you!

## 2022-08-22 DIAGNOSIS — E10.65 TYPE 1 DIABETES MELLITUS WITH HYPERGLYCEMIA (H): ICD-10-CM

## 2022-08-24 RX ORDER — PROCHLORPERAZINE 25 MG/1
1 SUPPOSITORY RECTAL
Qty: 1 EACH | Refills: 1 | OUTPATIENT
Start: 2022-08-24 | End: 2022-08-24

## 2022-08-24 RX ORDER — PROCHLORPERAZINE 25 MG/1
1 SUPPOSITORY RECTAL
Qty: 1 EACH | Refills: 1 | Status: SHIPPED | OUTPATIENT
Start: 2022-08-24 | End: 2023-01-30

## 2022-09-15 ENCOUNTER — ALLIED HEALTH/NURSE VISIT (OUTPATIENT)
Dept: NURSING | Facility: CLINIC | Age: 22
End: 2022-09-15
Payer: COMMERCIAL

## 2022-09-15 VITALS — OXYGEN SATURATION: 100 % | DIASTOLIC BLOOD PRESSURE: 76 MMHG | SYSTOLIC BLOOD PRESSURE: 118 MMHG | HEART RATE: 83 BPM

## 2022-09-15 DIAGNOSIS — R42 DIZZINESS: Primary | ICD-10-CM

## 2022-09-15 PROCEDURE — 99207 PR NO CHARGE NURSE ONLY: CPT

## 2022-09-15 NOTE — PROGRESS NOTES
Patient wants to make sure its ok to drive home- 20-25 min drive.   Feels like a motion sickness, like on a boat. Getting worse throughout the day.   Denies headache, vision changes.   Had episode last Tuesday and Saturday where she got dizzy suddenly.   Saw a doctor yesterday and had labs drawn.   Has appointment tomorrow with provider.   Type 1 diabetic. .     Advised to drink electrolyte - like gatorade. Pull over if feels worse on the way.  Home. Follow up with provider tomorrow.      BP 11/76  HR 83  Oxygen 83%

## 2022-09-21 DIAGNOSIS — E10.649 TYPE 1 DIABETES MELLITUS WITH HYPOGLYCEMIA AND WITHOUT COMA (H): ICD-10-CM

## 2022-09-21 RX ORDER — INSULIN GLARGINE-YFGN 100 [IU]/ML
21 INJECTION, SOLUTION SUBCUTANEOUS DAILY
Qty: 30 ML | Refills: 0 | OUTPATIENT
Start: 2022-09-21

## 2022-10-17 DIAGNOSIS — E10.65 TYPE 1 DIABETES MELLITUS WITH HYPERGLYCEMIA (H): ICD-10-CM

## 2022-10-17 RX ORDER — INSULIN LISPRO 100 [IU]/ML
INJECTION, SOLUTION INTRAVENOUS; SUBCUTANEOUS
Qty: 70 ML | Refills: 0 | Status: SHIPPED | OUTPATIENT
Start: 2022-10-17 | End: 2023-02-16

## 2022-10-17 RX ORDER — PROCHLORPERAZINE 25 MG/1
3 SUPPOSITORY RECTAL
Qty: 9 EACH | Refills: 0 | Status: SHIPPED | OUTPATIENT
Start: 2022-10-17 | End: 2023-03-06

## 2022-11-19 ENCOUNTER — HEALTH MAINTENANCE LETTER (OUTPATIENT)
Age: 22
End: 2022-11-19

## 2022-11-24 NOTE — PROGRESS NOTES
SUBJECTIVE:   Heidi Madden is a 17 year old female who presents to clinic today with mother because of:  Mother is in the lobby    Chief Complaint   Patient presents with     Weight Loss    Excessive thirst a lot daily. More like dry mouth and dry throat about 2 weeks   Appetite seems fine. It seems she is losing weight, no vomit.  For a while       HPI  The patient is a 17 year old female who presents to the clinic for evaluation of weight loss. She has lost about ten pounds in the past month but denies trying to lose weight. She has noticed that she has increased her food intake and is eating more snacks throughout the day. She has also had excessive thirst for the past two weeks and has been drinking 3-4x the amount of fluids from her normal. Due to the increased fluid intake, she has been having to urinate more frequently. She reports nocturia x2. She denies feeling sick, diarrhea, constipation, rashes, joint pain, recent illness.      ROS  Constitutional, eye, ENT, skin, respiratory, cardiac, GI, MSK, neuro, and allergy are normal except as otherwise noted.    PROBLEM LIST  Patient Active Problem List    Diagnosis Date Noted     Acne vulgaris 06/14/2015     Priority: Medium     Tinea versicolor 06/14/2015     Priority: Medium     Anxiety 11/17/2013     Priority: Medium     Mood swing (H) 09/21/2013     Priority: Medium     Hematochezia 09/09/2013     Priority: Medium     Constipation 09/09/2013     Priority: Medium     UTI (urinary tract infection) 07/25/2005     Priority: Medium     Ureteral reimplantation '05  Normal Post Surgical [facility] and VALERIE  Problem list name updated by automated process. Provider to review        MEDICATIONS  No current outpatient prescriptions on file.      ALLERGIES  Allergies   Allergen Reactions     No Known Drug Allergies        Reviewed and updated as needed this visit by clinical staff  Tobacco  Allergies  Meds  Med Hx  Surg Hx  Fam Hx  Soc Hx        Reviewed and updated as  "needed this visit by Provider        This document serves as a record of the services and decisions personally performed and made by Lacey Dexter MD. It was created on his behalf by Lily Head, a trained medical scribe. The creation of this document is based on the provider's statements to the medical scribe.  Lily Head 2:56 PM 3/16/2018  OBJECTIVE:   /68 (BP Location: Left arm, Patient Position: Chair, Cuff Size: Adult Regular)  Pulse 77  Temp 98.6  F (37  C) (Oral)  Resp 18  Ht 1.626 m (5' 4.01\")  Wt 50.3 kg (111 lb)  LMP 03/12/2018  SpO2 100%  BMI 19.05 kg/m2  47 %ile based on CDC 2-20 Years stature-for-age data using vitals from 3/16/2018.  24 %ile based on CDC 2-20 Years weight-for-age data using vitals from 3/16/2018.  21 %ile based on CDC 2-20 Years BMI-for-age data using vitals from 3/16/2018.  Blood pressure percentiles are 44.3 % systolic and 57.2 % diastolic based on NHBPEP's 4th Report.   Wt Readings from Last 5 Encounters:           03/16/18 111 lb (50.3 kg) (24 %)*   02/08/18 121 lb (54.9 kg) (46 %)*   06/07/17 121 lb 9 oz (55.1 kg) (50 %)*     * Growth percentiles are based on CDC 2-20 Years data.       GENERAL: Thin, Active, alert, in no acute distress, anxious, normal respirations.   Neck supple and free of adenopathy, or masses. No thyromegaly.   CHELLY. Ears, throat are normal. Mouth tacky.  Chest is clear to IPPA.   Heart sounds are normal, no murmurs, clicks, gallops or rubs.   Extremities are normal.   Peripheral pulses are normal.     25 minutes spent with patient and her mother, over 50% time counseling, coordinating care and explaining about nature of the patient's conditions.    DIAGNOSTICS:   Results for orders placed or performed in visit on 03/16/18 (from the past 24 hour(s))   UA reflex to Microscopic and Culture   Result Value Ref Range    Color Urine Yellow     Appearance Urine Clear     Glucose Urine 500 (A) NEG^Negative mg/dL    Bilirubin Urine " Negative NEG^Negative    Ketones Urine 80 (A) NEG^Negative mg/dL    Specific Gravity Urine <=1.005 1.003 - 1.035    Blood Urine Moderate (A) NEG^Negative    pH Urine 5.0 5.0 - 7.0 pH    Protein Albumin Urine Negative NEG^Negative mg/dL    Urobilinogen Urine 0.2 0.2 - 1.0 EU/dL    Nitrite Urine Negative NEG^Negative    Leukocyte Esterase Urine Negative NEG^Negative    Source Midstream Urine    Urine Microscopic   Result Value Ref Range    WBC Urine 0 - 5 OTO5^0 - 5 /HPF    RBC Urine 5-10 (A) OTO2^O - 2 /HPF     ASSESSMENT/PLAN:     1. Type 1 diabetes mellitus without complication (H)    2. Excessive thirst      Heidi  has a classic presentation type I diabetes. Due to her anxiety over blood draw and certainty of diagnosis, urine sample was obtained and confirmed diagnosis. Discussed cause and natural history of Type I Diabetes; treatment options including potential side effects of treatments and consequences of withholding treatment.   The patient will see Ped endocrinology through [facility] directly for establishment of care and beginning of medications for management tonight. Hospitalization is an option.       The information in this document, created by a scribe for me, accurately reflects the services I personally performed and the decisions made by me. I have reviewed and approved this document for accuracy.    Lacey Hillman MD          decreased safety awareness/losing balance/decreased sequencing ability

## 2022-12-18 ENCOUNTER — HEALTH MAINTENANCE LETTER (OUTPATIENT)
Age: 22
End: 2022-12-18

## 2022-12-30 ENCOUNTER — DOCUMENTATION ONLY (OUTPATIENT)
Dept: OTHER | Facility: CLINIC | Age: 22
End: 2022-12-30

## 2023-01-03 ASSESSMENT — ENCOUNTER SYMPTOMS
JAUNDICE: 0
INSOMNIA: 0
NECK MASS: 0
RECTAL PAIN: 0
ABDOMINAL PAIN: 0
NAUSEA: 0
DEPRESSION: 1
MUSCLE WEAKNESS: 0
POLYDIPSIA: 0
MYALGIAS: 1
MUSCLE CRAMPS: 0
BLOOD IN STOOL: 0
HEARTBURN: 0
PANIC: 1
POLYPHAGIA: 0
SMELL DISTURBANCE: 0
SINUS CONGESTION: 0
WEIGHT LOSS: 0
STIFFNESS: 0
TASTE DISTURBANCE: 0
NIGHT SWEATS: 0
HALLUCINATIONS: 0
DIARRHEA: 1
ALTERED TEMPERATURE REGULATION: 0
FATIGUE: 0
INCREASED ENERGY: 0
DECREASED CONCENTRATION: 0
NECK PAIN: 1
CHILLS: 0
JOINT SWELLING: 0
HOARSE VOICE: 0
WEIGHT GAIN: 0
BOWEL INCONTINENCE: 0
VOMITING: 0
NERVOUS/ANXIOUS: 1
BLOATING: 0
FEVER: 0
BACK PAIN: 1
SINUS PAIN: 0
TROUBLE SWALLOWING: 0
ARTHRALGIAS: 0
SORE THROAT: 0
CONSTIPATION: 0
DECREASED APPETITE: 0

## 2023-01-04 ENCOUNTER — VIRTUAL VISIT (OUTPATIENT)
Dept: ENDOCRINOLOGY | Facility: CLINIC | Age: 23
End: 2023-01-04
Payer: COMMERCIAL

## 2023-01-04 ENCOUNTER — TELEPHONE (OUTPATIENT)
Dept: ENDOCRINOLOGY | Facility: CLINIC | Age: 23
End: 2023-01-04

## 2023-01-04 ENCOUNTER — MYC MEDICAL ADVICE (OUTPATIENT)
Dept: ENDOCRINOLOGY | Facility: CLINIC | Age: 23
End: 2023-01-04

## 2023-01-04 ENCOUNTER — LAB (OUTPATIENT)
Dept: LAB | Facility: CLINIC | Age: 23
End: 2023-01-04
Payer: COMMERCIAL

## 2023-01-04 DIAGNOSIS — E10.649 TYPE 1 DIABETES MELLITUS WITH HYPOGLYCEMIA AND WITHOUT COMA (H): Primary | ICD-10-CM

## 2023-01-04 DIAGNOSIS — E10.65 TYPE 1 DIABETES MELLITUS WITH HYPERGLYCEMIA (H): ICD-10-CM

## 2023-01-04 LAB
CHOLEST SERPL-MCNC: 168 MG/DL
HBA1C MFR BLD: 6.5 % (ref 0–5.6)
HDLC SERPL-MCNC: 73 MG/DL
LDLC SERPL CALC-MCNC: 86 MG/DL
NONHDLC SERPL-MCNC: 95 MG/DL
TRIGL SERPL-MCNC: 47 MG/DL

## 2023-01-04 PROCEDURE — 36415 COLL VENOUS BLD VENIPUNCTURE: CPT

## 2023-01-04 PROCEDURE — 99214 OFFICE O/P EST MOD 30 MIN: CPT | Mod: 95 | Performed by: INTERNAL MEDICINE

## 2023-01-04 PROCEDURE — 83036 HEMOGLOBIN GLYCOSYLATED A1C: CPT

## 2023-01-04 PROCEDURE — 80061 LIPID PANEL: CPT

## 2023-01-04 RX ORDER — INSULIN GLARGINE-YFGN 100 [IU]/ML
16 INJECTION, SOLUTION SUBCUTANEOUS DAILY
Qty: 15 ML | Refills: 1 | Status: SHIPPED | OUTPATIENT
Start: 2023-01-04 | End: 2023-06-14

## 2023-01-04 NOTE — PROGRESS NOTES
Heidi Madden  is being evaluated via a billable video visit.      How would you like to obtain your AVS? [employer]  For the video visit, send the invitation by: Text to cell phone: [phone]  Will anyone else be joining your video visit? No

## 2023-01-04 NOTE — TELEPHONE ENCOUNTER
[facility] Call Center    Phone Message    May a detailed message be left on voicemail: yes     Reason for Call: Other: Patient was ok changing to Video , needs link asap. Thank you      Action Taken: Other: ENDO    Travel Screening: Not Applicable

## 2023-01-04 NOTE — Clinical Note
"    1/4/2023         RE: Heidi Madden  [address] [place] [address]        Dear Colleague,    Thank you for referring your patient, Heidi Madden, to the [facility]. Please see a copy of my visit note below.    Heidi Madden  is being evaluated via a billable video visit.      How would you like to obtain your AVS? [place]  For the video visit, send the invitation by: Text to cell phone: [phone]  Will anyone else be joining your video visit? No    Outcome for 01/04/23 8:57 AM: {BGOUTCOME:888674}  {endovfs:035452}          THIS IS A VIDEO VISIT:    Phone call visit/virtual visit encounter:    Name of patient: Heidi Madden    Date of encounter: 1/4/2023    Time of start of video visit:9:00    Video started:9:10    Video ended:9:30    Provider location: working from home/ [facility]    Patient location: patients home.    Mode of transmission: [place] [facility] video/ [email or web address]    Verbal consent: obtained before starting visit. Pt is agreeable.      The patient has been notified of following:      \"This VIDEO visit will be conducted via a call between you and your physician/provider. We have found that certain health care needs can be provided without the need for a physical exam.  This service lets us provide the care you need with a short phone conversation.  If a prescription is necessary we can send it directly to your pharmacy.  If lab work is needed we can place an order for that and you can then stop by our lab to have the test done at a later time.     With new updates with corona virus patient might be billed as clinic visit.     If during the course of the call the physician/provider feels a telephone visit is not appropriate, you will not be charged for this service.\"      Past medical history, social history, family history, allergy and medications were reviewed and updated as appropriate.  Reviewed pertinent labs, notes, imaging studies personally.      Endocrinology " Clinic Note:  Name: Heidi Madden  Seen for f/u of type 1 Diabetes.  HPI:  Heidi Madden is a 21 year old female who presents for the evaluation/management of type 1 diabetes.  Was seen by pediatric endocrinologist  before.    Using MDI and Dexcom.  She is not interested in pump at this time but wants to think about it.    She graduated this year and will be starting job at [employer].  Living with mother and sister.    She reports that currently she is going to bed at night and sometimes snacking at night.       1. Type 1 DM:  Orginally diagnosed: 3/16/2018  Hospitalization for DKA: no  Current Regimen:   Semglee: 16 units/day  Humalo unit/ 5-6 gm of Carbs + sliding scale insulin 1 unit 50 > 150.  Typically takes about 10-20 TID.    yes:     Diabetes Medication(s)     Diabetic Other       Glucagon (BAQSIMI TWO PACK) 3 MG/DOSE POWD    Spray 3 mg in nostril as needed (hypoglycemic siezure or unconsciousness)    Insulin       Insulin Glargine-yfgn (SEMGLEE, YFGN,) 100 UNIT/ML SOPN    Inject 21 Units Subcutaneous daily     insulin lispro (HUMALOG KWIKPEN) 100 UNIT/ML (1 unit dial) KWIKPEN    The patient uses up to 70 units per day.          BS checks: dexcom  Average Meter Download: Blood glucose data reviewed personally. See nursing note from this encounter for details.  Blood sugars are mostly in acceptable range.  Exercise: walks/ bike ride everyday.  Last A1c:  Symptoms of hypoglycemia (low blood sugar): yes  Using PUMP:      DM Complications:   Complications:   Diabetes Complications  Description / Detail    Diabetic Retinopathy  No   CAD / PAD  No   Neuropathy  No   Nephropathy / Microalbuminuria  No  No results found for: UMALCR      Gastroparesis  No   Hypoglycemia [place]  No          2. Hypertension: Blood Pressure today:   BP Readings from Last 3 Encounters:   09/15/22 118/76   22 120/75   12/10/19 123/81   .     3. Hyperlipidemia:  On medication    PMH/PSH:  No past medical  history on file.  Past Surgical History:   Procedure Laterality Date     ZZC REIMPLANT URETER,VESICOPSOAS HITCH  01/05     Family Hx:  Family History   Problem Relation Age of Onset     Thyroid Disease Mother         hashimoyoto'd     Family History Negative Father      Lipids Father      Psychotic Disorder Father         anger mood swings     Lipids Maternal Grandmother      Hypertension Maternal Grandfather      Diabetes Paternal Grandmother      Thyroid Disease Paternal Grandmother         hypo, partial thyroidectomy     Circulatory Paternal Grandmother         coroid arteries clogged, scraped     Heart Disease Paternal Grandfather         Angioplasty     Alcohol/Drug Paternal Grandfather      Family History Negative Sister      Family History Negative Sister      Lipids Sister         as early teen     Psychotic Disorder Other         bipolar  2 first cousins     Cancer No family hx of         sno cancer. maternal uncle           DM2: aunt, uncle and pgm.           Social Hx:  Social History     Socioeconomic History     Marital status: Single     Spouse name: Not on file     Number of children: Not on file     Years of education: Not on file     Highest education level: Not on file   Occupational History     Not on file   Tobacco Use     Smoking status: Never     Smokeless tobacco: Never     Tobacco comments:     No one in family smokes.   Substance and Sexual Activity     Alcohol use: No     Drug use: No     Sexual activity: Never     Comment: constantine march 2018   Other Topics Concern     Not on file   Social History Narrative     Not on file     Social Determinants of Health     Financial Resource Strain: Not on file   Food Insecurity: Not on file   Transportation Needs: Not on file   Physical Activity: Not on file   Stress: Not on file   Social Connections: Not on file   Intimate Partner Violence: Not on file   Housing Stability: Not on file          MEDICATIONS:  has a current medication list which includes  the following prescription(s): dexcom g6 , dexcom g6 sensor, dexcom g6 transmitter, baqsimi two pack, insulin glargine-yfgn, insulin lispro, bd lynda u/f, onetouch delica lancets 33g, onetouch verio iq, urine glucose-ketones test, aspirin not prescribed, and statin not prescribed.    ROS     ROS: 10 point ROS neg other than the symptoms noted above in the HPI.    Physical Exam   VS: There were no vitals taken for this visit.  GENERAL: healthy, alert and no distress  EYES: Eyes grossly normal to inspection, conjunctivae and sclerae normal  ENT: no nose swelling, nasal discharge.  Thyroid: no apparent thyroid nodules  RESP: no audible wheeze, cough, or visible cyanosis.  No visible retractions or increased work of breathing.  Able to speak fully in complete sentences.  ABDO: not evaluated.  EXTREMITIES: no hand tremors.  NEURO: Cranial nerves grossly intact, mentation intact and speech normal  SKIN: No apparent skin lesions, rash or edema seen   PSYCH: mentation appears normal, affect normal/bright, judgement and insight intact, normal speech and appearance well-groomed      LABS:  A1c:  Lab Results   Component Value Date    A1C 6.5 01/04/2023    A1C 6.1 05/25/2022    A1C 5.5 12/10/2019    A1C 6.1 09/10/2019    A1C 6.1 06/18/2019    A1C 5.7 02/19/2019    A1C 6.0 11/20/2018       BMP:  Creatinine   Date Value Ref Range Status   05/25/2022 0.72 0.52 - 1.04 mg/dL Final   03/16/2018 0.56 0.50 - 1.00 mg/dL Final       Urine Micro:  Lab Results   Component Value Date    UMALCR  05/25/2022      Comment:      Unable to calculate:  Urine creatinine or albumin value below detectable level        LFTs/Lipids:  Recent Labs   Lab Test 03/15/19  0909   CHOL 176*   HDL 55      TRIG 58       TFTs:  TSH   Date Value Ref Range Status   05/25/2022 1.82 0.40 - 4.00 mU/L Final   11/23/2018 3.52 0.40 - 4.00 mU/L Final       Blood Glucose and pump data/ Meter reviewed.     All pertinent notes, labs, and images personally  reviewed by me.       Glucometer/ insulin pump (if applicable)/ CGM data (if applicable) downloaded, Personally reviewed and interpreted.  All Blood sugar data reviewed personally and interpreted as well as discussed with pt.  All past medical, social and Family history reviewed and updated in Psychiatric.    A/P  Ms.Abby TYESHA Madden is a 22 year old here for the evaluation/management of diabetes:    1. DM1 - Under Fair control.  A1c 6.5%.  No known diabetes related complications.  No recent A1c to review.  She is using multiple injections plus dexcom.  She is not interested in insulin pump at this time.  In general blood sugars are in acceptable range.  Plan:  Discussed diagnosis, pathophysiology, management and treatment options of condition with pt.  I also discussed importance of strict blood sugar control to prevent complications associated with uncontrolled diabetes.    Continue Semglee at current dose - 16 units/day  Increase humalog at dinner to 1?5 mg of carbs or 1/5.5 gm of carbs.  Avoid overcorrection for high Blood glucose  Continue to use DEXCOM  Look into pump options - Medtronic, Omnipod and Tandem.  Labs and follow up in 3 months.  Please make a lab appointment for blood work and follow up clinic appointment in 1 week after that to discuss results.         2. Hypertension - Under Good  control. Not on medication.    3. Hyperlipidemia - Under Good control. Not on medication.  4. Prevention  Ophthalmology-recommend annually  ASA-not indicated secondary to age  Smoking-non-smoker    Foot exam- 5/25/2022      Most Recent Immunizations   Administered Date(s) Administered     COVID-19 Vaccine 18+ (Moderna) 05/18/2021     Comvax (HIB/HepB) 07/11/2001     DTAP (<7y) 06/29/2004     HEPA 03/26/2013     HPV 03/26/2013     Influenza Intranasal Vaccine 09/21/2013     Influenza Vaccine >6 months (Alfuria,Fluzone) 03/27/2017     Influenza Vaccine, 6+MO IM (QUADRIVALENT W/PRESERVATIVES) 10/15/2019     MMR 06/29/2004      Meningococcal ACWY (Menactra ) 07/11/2016     Nasal Influenza Vaccine 2-49 (FluMist) 11/23/2018     Pneumo Conj 13-V (2010&after) 11/14/2019     Pneumococcal (PCV 7) 07/11/2001     Pneumococcal 23 valent 01/16/2020     Poliovirus, inactivated (IPV) 06/29/2004     TDAP Vaccine (Boostrix) 06/27/2011     Typhoid Oral 03/27/2017     Varicella 06/27/2011         Recommend checking blood sugars before meals and at bedtime.    If Blood glucose are low more often-> 2-3 times/week- give us a call.  The patient is advised to Make better food choices: reduce carbs, Reduce portion size, weight loss and exercise 3-4 times a week.  Discussed hypoglycemia signs and symptoms as well as management in detail.      There is some variability among people, most will usually develop symptoms suggestive of hypoglycemia when blood glucose levels are lowered to the mid 60's. The first set of symptoms are called adrenergic. Patients may experience any of the following nervousness, sweating, intense hunger, trembling, weakness, palpitations, and difficulty speaking. When BS fall below 50 the patient is unable to talk and take oral therapy.  Would recommend Glucagon emergency kit for the patient and education for family and friends around the patient.   The acute management of hypoglycemia involves the rapid delivery of a source of easily absorbed sugar. Regular soda, juice, lifesavers, table sugar, are good options. 15 grams of glucose is the dose that is given, followed by an assessment of symptoms and a blood glucose check if possible. If after 10 minutes there is no improvement, another 10-15 grams should be given. This can be repeated up to three times. The equivalency of 10-15 grams of glucose (approximate servings) are: Four lifesavers, 4 teaspoons of sugar, or 1/2 cup or 4 oz of juice or regular pop.    Follow-up:  3 months.    Zakia Fox M.D  [facility]  CC: No Ref-Primary, Physician      All  questions were answered.  The patient indicates understanding of the above issues and agrees with the plan set forth.     Disclaimer: This note consists of symbols derived from keyboarding, dictation and/or voice recognition software. As a result, there may be errors in the script that have gone undetected. Please consider this when interpreting information found in this chart.    Addendum to above note and clinic visit:    Labs reviewed.    See result note/telephone encounter.        Answers for HPI/ROS submitted by the patient on 1/3/2023  General Symptoms: Yes  Skin Symptoms: No  HENT Symptoms: Yes  EYE SYMPTOMS: No  HEART SYMPTOMS: No  LUNG SYMPTOMS: No  INTESTINAL SYMPTOMS: Yes  URINARY SYMPTOMS: No  GYNECOLOGIC SYMPTOMS: No  BREAST SYMPTOMS: No  SKELETAL SYMPTOMS: Yes  BLOOD SYMPTOMS: No  NERVOUS SYSTEM SYMPTOMS: No  MENTAL HEALTH SYMPTOMS: Yes  Ear pain: No  Ear discharge: No  Hearing loss: No  Tinnitus: Yes  Nosebleeds: No  Congestion: No  Sinus pain: No  Trouble swallowing: No   Voice hoarseness: No  Mouth sores: No  Sore throat: No  Tooth pain: No  Gum tenderness: No  Bleeding gums: No  Change in taste: No  Change in sense of smell: No  Dry mouth: No  Hearing aid used: No  Neck lump: No  Fever: No  Loss of appetite: No  Weight loss: No  Weight gain: No  Fatigue: No  Night sweats: No  Chills: No  Increased stress: Yes  Excessive hunger: No  Excessive thirst: No  Feeling hot or cold when others believe the temperature is normal: No  Loss of height: No  Post-operative complications: No  Surgical site pain: No  Hallucinations: No  Change in or Loss of Energy: No  Hyperactivity: No  Confusion: No  Heart burn or indigestion: No  Nausea: No  Vomiting: No  Abdominal pain: No  Bloating: No  Constipation: No  Diarrhea: Yes  Blood in stool: No  Black stools: No  Rectal or Anal pain: No  Fecal incontinence: No  Yellowing of skin or eyes: No  Vomit with blood: No  Change in stools: Yes  Back pain: Yes  Muscle aches:  Yes  Neck pain: Yes  Swollen joints: No  Joint pain: No  Bone pain: No  Muscle cramps: No  Muscle weakness: No  Joint stiffness: No  Bone fracture: No  Nervous or Anxious: Yes  Depression: Yes  Trouble sleeping: No  Trouble thinking or concentrating: No  Mood changes: Yes  Panic attacks: Yes          Again, thank you for allowing me to participate in the care of your patient.        Sincerely,        Zakia Fox MD

## 2023-01-04 NOTE — PATIENT INSTRUCTIONS
-[facility]  Dr Fox, Endocrinology Department    [facility]   [address] [facility]  [place] [address]  Appointment [phone]  Fax: [phone]  [place]: Monday - Thursday      To provide the best diabetic care, please bring your blood glucose meter to each and every visit with your Endocrinologist.  Your blood glucose meter/insulin pump will be downloaded at every appointment.    Please arrive 15 minutes before your scheduled appointment.  This will allow for your blood glucose meter/insulin pump to be downloaded.  If you are wearing DEXCOM please bring  or sharing code so that it can be downloaded.  If you are using freestyle victorino personal sensors please bring the reader.  If you are using TANDEM insulin pump please have your username and password to get info from Tandem website.      Continue Semglee at current dose - 16 units/day  Increase humalog at dinner to 1?5 mg of carbs or 1/5.5 gm of carbs.  Avoid overcorrection for high Blood glucose  Continue to use DEXCOM  Look into pump options - Medtronic, Omnipod and Tandem.  Labs and follow up in 3 months.  Please make a lab appointment for blood work and follow up clinic appointment in 1 week after that to discuss results.      Recommend checking blood sugars before meals and at bedtime.    If Blood glucose are low more often-> 2-3 times/week- give us a call.  Make better food choices: reduce carbs, Reduce portion size, weight loss and exercise 3-4 times a week.    What is hypoglycemia:  Hypoglycemia is when blood sugar levels become too low - below 70 m/dl.      What causes hypoglycemia?  - using too much insulin  -taking too many diabetes pills  -not eating enough, or skipping meals or snacks  -not eating enough carbohydrate with meals  -changing your exercise routine  -drinking alcohol in excess    It is also possible to have hypoglycemia even when you are carefully managing your blood  sugar levels.    What does it feel like when blood sugars get too low?  You may feel:  - anxious  -confused  -dizzy  -hungry  -light-headed  -nervous  -shaky  -sleepy  -sweaty    You may have  -blurred or cloudy vision  -heart palpitations (heart skips a beat or races)  -tingling or numbness around the mouth and tongue  -tremors    What to do if you have symptoms of hypoglycmemia:  If you think your blood sugar is too low, check it with a glucose meter.  If its below 70 mg/dl, consume one of the following:  Fruit juice (1/2 cup)  Glucose tablets (15 grams)  Hard candy (5 to 7 pieces)  Honey or sugar (2 teaspoons)  Milk (1/2 cup)  Soft drink (non-diet, 1/2 cup)    Wait 15 minutes and check your blood glucose again.  IF it is still below 70 mg/dl, have another food item listed above. Wait another 15 minutes and repeat the blood glucose test.  Have a small meal or snack that contains some carbohydrate after your blood glucose rises above 70 mg/dl.    If you are at risk of hypoglycemia, always carry with you glucose tablets or one of the foods listed above.      To prevent Hypoglycemia:  Avoid situations that may cause hypoglycemia  Before making any change to your diet or exercise routine, discuss them with your healthcare provider  Keep a record of your blood glucose levels.  Include the time of day, diabetes medications, when you had your last meal or snack, and what you were doing at the time (e.g. Watching TV, gardening, jogging, etc).    Talk to your healthcare provider if your blood glucose levels are often low        Patient guide on hypoglycemia    [email or web address].org/Resources/[email or web address]-diagnosis-[email or web address]-management-[email or web address]

## 2023-01-04 NOTE — PROGRESS NOTES
"THIS IS A VIDEO VISIT:    Phone call visit/virtual visit encounter:    Name of patient: Heidi Madden    Date of encounter: 1/4/2023    Time of start of video visit:9:00    Video started:9:10    Video ended:9:30    Provider location: working from home/ [facility]    Patient location: patients home.    Mode of transmission: [employer] video/ [employer]    Verbal consent: obtained before starting visit. Pt is agreeable.      The patient has been notified of following:      \"This VIDEO visit will be conducted via a call between you and your physician/provider. We have found that certain health care needs can be provided without the need for a physical exam.  This service lets us provide the care you need with a short phone conversation.  If a prescription is necessary we can send it directly to your pharmacy.  If lab work is needed we can place an order for that and you can then stop by our lab to have the test done at a later time.     With new updates with corona virus patient might be billed as clinic visit.     If during the course of the call the physician/provider feels a telephone visit is not appropriate, you will not be charged for this service.\"      Past medical history, social history, family history, allergy and medications were reviewed and updated as appropriate.  Reviewed pertinent labs, notes, imaging studies personally.      Endocrinology Clinic Note:  Name: Heidi Madden  Seen for f/u of type 1 Diabetes.  HPI:  Heidi Madden is a 21 year old female who presents for the evaluation/management of type 1 diabetes.  Was seen by pediatric endocrinologist  before.    Using MDI and Dexcom.  She is not interested in pump at this time but wants to think about it.    She graduated this year and will be starting job at [employer].  Living with mother and sister.    She reports that currently she is going to bed at night and sometimes snacking at night.       1. Type 1 DM:  Orginally diagnosed: " 3/16/2018  Hospitalization for DKA: no  Current Regimen:   Semglee: 16 units/day  Humalo unit/ 5-6 gm of Carbs + sliding scale insulin 1 unit 50 > 150.  Typically takes about 10-20 TID.    yes:     Diabetes Medication(s)     Diabetic Other       Glucagon (BAQSIMI TWO PACK) 3 MG/DOSE POWD    Spray 3 mg in nostril as needed (hypoglycemic siezure or unconsciousness)    Insulin       Insulin Glargine-yfgn (SEMGLEE, YFGN,) 100 UNIT/ML SOPN    Inject 21 Units Subcutaneous daily     insulin lispro (HUMALOG KWIKPEN) 100 UNIT/ML (1 unit dial) KWIKPEN    The patient uses up to 70 units per day.          BS checks: dexcom  Average Meter Download: Blood glucose data reviewed personally. See nursing note from this encounter for details.  Blood sugars are mostly in acceptable range.  Exercise: walks/ bike ride everyday.  Last A1c:  Symptoms of hypoglycemia (low blood sugar): yes  Using PUMP:      DM Complications:   Complications:   Diabetes Complications  Description / Detail    Diabetic Retinopathy  No   CAD / PAD  No   Neuropathy  No   Nephropathy / Microalbuminuria  No  No results found for: UMALCR      Gastroparesis  No   Hypoglycemia [place]  No          2. Hypertension: Blood Pressure today:   BP Readings from Last 3 Encounters:   09/15/22 118/76   22 120/75   12/10/19 123/81   .     3. Hyperlipidemia:  On medication    PMH/PSH:  No past medical history on file.  Past Surgical History:   Procedure Laterality Date     [facility] REIMPLANT URETER,VESICOPSOAS HITCH       Family Hx:  Family History   Problem Relation Age of Onset     Thyroid Disease Mother         hashimoyoto'd     Family History Negative Father      Lipids Father      Psychotic Disorder Father         anger mood swings     Lipids Maternal Grandmother      Hypertension Maternal Grandfather      Diabetes Paternal Grandmother      Thyroid Disease Paternal Grandmother         hypo, partial thyroidectomy     Circulatory Paternal Grandmother          coroid arteries clogged, scraped     Heart Disease Paternal Grandfather         Angioplasty     Alcohol/Drug Paternal Grandfather      Family History Negative Sister      Family History Negative Sister      Lipids Sister         as early teen     Psychotic Disorder Other         bipolar  2 first cousins     Cancer No family hx of         sno cancer. maternal uncle           DM2: aunt, uncle and pgm.           Social Hx:  Social History     Socioeconomic History     Marital status: Single     Spouse name: Not on file     Number of children: Not on file     Years of education: Not on file     Highest education level: Not on file   Occupational History     Not on file   Tobacco Use     Smoking status: Never     Smokeless tobacco: Never     Tobacco comments:     No one in family smokes.   Substance and Sexual Activity     Alcohol use: No     Drug use: No     Sexual activity: Never     Comment: constantine march 2018   Other Topics Concern     Not on file   Social History Narrative     Not on file     Social Determinants of Health     Financial Resource Strain: Not on file   Food Insecurity: Not on file   Transportation Needs: Not on file   Physical Activity: Not on file   Stress: Not on file   Social Connections: Not on file   Intimate Partner Violence: Not on file   Housing Stability: Not on file          MEDICATIONS:  has a current medication list which includes the following prescription(s): dexcom g6 , dexcom g6 sensor, dexcom g6 transmitter, baqsimi two pack, insulin glargine-yfgn, insulin lispro, bd lynda u/f, onetouch delica lancets 33g, onetouch verio iq, urine glucose-ketones test, aspirin not prescribed, and statin not prescribed.    ROS     ROS: 10 point ROS neg other than the symptoms noted above in the HPI.    Physical Exam   VS: There were no vitals taken for this visit.  GENERAL: healthy, alert and no distress  EYES: Eyes grossly normal to inspection, conjunctivae and sclerae normal  ENT: no nose  swelling, nasal discharge.  Thyroid: no apparent thyroid nodules  RESP: no audible wheeze, cough, or visible cyanosis.  No visible retractions or increased work of breathing.  Able to speak fully in complete sentences.  ABDO: not evaluated.  EXTREMITIES: no hand tremors.  NEURO: Cranial nerves grossly intact, mentation intact and speech normal  SKIN: No apparent skin lesions, rash or edema seen   PSYCH: mentation appears normal, affect normal/bright, judgement and insight intact, normal speech and appearance well-groomed      LABS:  A1c:  Lab Results   Component Value Date    A1C 6.5 01/04/2023    A1C 6.1 05/25/2022    A1C 5.5 12/10/2019    A1C 6.1 09/10/2019    A1C 6.1 06/18/2019    A1C 5.7 02/19/2019    A1C 6.0 11/20/2018       BMP:  Creatinine   Date Value Ref Range Status   05/25/2022 0.72 0.52 - 1.04 mg/dL Final   03/16/2018 0.56 0.50 - 1.00 mg/dL Final       Urine Micro:  Lab Results   Component Value Date    UMALCR  05/25/2022      Comment:      Unable to calculate:  Urine creatinine or albumin value below detectable level        LFTs/Lipids:  Recent Labs   Lab Test 03/15/19  0909   CHOL 176*   HDL 55      TRIG 58       TFTs:  TSH   Date Value Ref Range Status   05/25/2022 1.82 0.40 - 4.00 mU/L Final   11/23/2018 3.52 0.40 - 4.00 mU/L Final       Blood Glucose and pump data/ Meter reviewed.     All pertinent notes, labs, and images personally reviewed by me.       Glucometer/ insulin pump (if applicable)/ CGM data (if applicable) downloaded, Personally reviewed and interpreted.  All Blood sugar data reviewed personally and interpreted as well as discussed with pt.  All past medical, social and Family history reviewed and updated in Epic.    A/P  Ms.Abby TYESHA Madden is a 22 year old here for the evaluation/management of diabetes:    1. DM1 - Under Fair control.  A1c 6.5%.  No known diabetes related complications.  No recent A1c to review.  She is using multiple injections plus dexcom.  She is not  interested in insulin pump at this time.  In general blood sugars are in acceptable range.  Plan:  Discussed diagnosis, pathophysiology, management and treatment options of condition with pt.  I also discussed importance of strict blood sugar control to prevent complications associated with uncontrolled diabetes.    Continue Semglee at current dose - 16 units/day  Increase humalog at dinner to 1?5 mg of carbs or 1/5.5 gm of carbs.  Avoid overcorrection for high Blood glucose  Continue to use DEXCOM  Look into pump options - Medtronic, Omnipod and Tandem.  Labs and follow up in 3 months.  Please make a lab appointment for blood work and follow up clinic appointment in 1 week after that to discuss results.         2. Hypertension - Under Good  control. Not on medication.    3. Hyperlipidemia - Under Good control. Not on medication.  4. Prevention  Ophthalmology-recommend annually  ASA-not indicated secondary to age  Smoking-non-smoker    Foot exam- 5/25/2022      Most Recent Immunizations   Administered Date(s) Administered     COVID-19 Vaccine 18+ (Moderna) 05/18/2021     Comvax (HIB/HepB) 07/11/2001     DTAP (<7y) 06/29/2004     HEPA 03/26/2013     HPV 03/26/2013     Influenza Intranasal Vaccine 09/21/2013     Influenza Vaccine >6 months (Alfuria,Fluzone) 03/27/2017     Influenza Vaccine, 6+MO IM (QUADRIVALENT W/PRESERVATIVES) 10/15/2019     MMR 06/29/2004     Meningococcal ACWY (Menactra ) 07/11/2016     Nasal Influenza Vaccine 2-49 (FluMist) 11/23/2018     Pneumo Conj 13-V (2010&after) 11/14/2019     Pneumococcal (PCV 7) 07/11/2001     Pneumococcal 23 valent 01/16/2020     Poliovirus, inactivated (IPV) 06/29/2004     TDAP Vaccine (Boostrix) 06/27/2011     Typhoid Oral 03/27/2017     Varicella 06/27/2011         Recommend checking blood sugars before meals and at bedtime.    If Blood glucose are low more often-> 2-3 times/week- give us a call.  The patient is advised to Make better food choices: reduce carbs,  Reduce portion size, weight loss and exercise 3-4 times a week.  Discussed hypoglycemia signs and symptoms as well as management in detail.      There is some variability among people, most will usually develop symptoms suggestive of hypoglycemia when blood glucose levels are lowered to the mid 60's. The first set of symptoms are called adrenergic. Patients may experience any of the following nervousness, sweating, intense hunger, trembling, weakness, palpitations, and difficulty speaking. When BS fall below 50 the patient is unable to talk and take oral therapy.  Would recommend Glucagon emergency kit for the patient and education for family and friends around the patient.   The acute management of hypoglycemia involves the rapid delivery of a source of easily absorbed sugar. Regular soda, juice, lifesavers, table sugar, are good options. 15 grams of glucose is the dose that is given, followed by an assessment of symptoms and a blood glucose check if possible. If after 10 minutes there is no improvement, another 10-15 grams should be given. This can be repeated up to three times. The equivalency of 10-15 grams of glucose (approximate servings) are: Four lifesavers, 4 teaspoons of sugar, or 1/2 cup or 4 oz of juice or regular pop.    Follow-up:  3 months.    Zakia Fox M.D  Endocrinology  [facility]/[place]  CC: No Ref-Primary, Physician      All questions were answered.  The patient indicates understanding of the above issues and agrees with the plan set forth.     Disclaimer: This note consists of symbols derived from keyboarding, dictation and/or voice recognition software. As a result, there may be errors in the script that have gone undetected. Please consider this when interpreting information found in this chart.    Addendum to above note and clinic visit:    Labs reviewed.    See result note/telephone encounter.        Answers for HPI/ROS submitted by the patient on 1/3/2023  General  Symptoms: Yes  Skin Symptoms: No  HENT Symptoms: Yes  EYE SYMPTOMS: No  HEART SYMPTOMS: No  LUNG SYMPTOMS: No  INTESTINAL SYMPTOMS: Yes  URINARY SYMPTOMS: No  GYNECOLOGIC SYMPTOMS: No  BREAST SYMPTOMS: No  SKELETAL SYMPTOMS: Yes  BLOOD SYMPTOMS: No  NERVOUS SYSTEM SYMPTOMS: No  MENTAL HEALTH SYMPTOMS: Yes  Ear pain: No  Ear discharge: No  Hearing loss: No  Tinnitus: Yes  Nosebleeds: No  Congestion: No  Sinus pain: No  Trouble swallowing: No   Voice hoarseness: No  Mouth sores: No  Sore throat: No  Tooth pain: No  Gum tenderness: No  Bleeding gums: No  Change in taste: No  Change in sense of smell: No  Dry mouth: No  Hearing aid used: No  Neck lump: No  Fever: No  Loss of appetite: No  Weight loss: No  Weight gain: No  Fatigue: No  Night sweats: No  Chills: No  Increased stress: Yes  Excessive hunger: No  Excessive thirst: No  Feeling hot or cold when others believe the temperature is normal: No  Loss of height: No  Post-operative complications: No  Surgical site pain: No  Hallucinations: No  Change in or Loss of Energy: No  Hyperactivity: No  Confusion: No  Heart burn or indigestion: No  Nausea: No  Vomiting: No  Abdominal pain: No  Bloating: No  Constipation: No  Diarrhea: Yes  Blood in stool: No  Black stools: No  Rectal or Anal pain: No  Fecal incontinence: No  Yellowing of skin or eyes: No  Vomit with blood: No  Change in stools: Yes  Back pain: Yes  Muscle aches: Yes  Neck pain: Yes  Swollen joints: No  Joint pain: No  Bone pain: No  Muscle cramps: No  Muscle weakness: No  Joint stiffness: No  Bone fracture: No  Nervous or Anxious: Yes  Depression: Yes  Trouble sleeping: No  Trouble thinking or concentrating: No  Mood changes: Yes  Panic attacks: Yes

## 2023-01-04 NOTE — NURSING NOTE
Patient denies any changes since echeck-in regarding medication and allergies and states all information entered during echeck-in remains accurate.      Betty Liu MA

## 2023-01-11 DIAGNOSIS — E10.65 TYPE 1 DIABETES MELLITUS WITH HYPERGLYCEMIA (H): ICD-10-CM

## 2023-01-12 RX ORDER — PEN NEEDLE, DIABETIC 32GX 5/32"
NEEDLE, DISPOSABLE MISCELLANEOUS
Qty: 720 EACH | Refills: 0 | Status: SHIPPED | OUTPATIENT
Start: 2023-01-12 | End: 2023-04-12

## 2023-01-20 ENCOUNTER — OFFICE VISIT (OUTPATIENT)
Dept: FAMILY MEDICINE | Facility: CLINIC | Age: 23
End: 2023-01-20
Payer: COMMERCIAL

## 2023-01-20 VITALS
OXYGEN SATURATION: 100 % | HEIGHT: 64 IN | HEART RATE: 77 BPM | SYSTOLIC BLOOD PRESSURE: 130 MMHG | BODY MASS INDEX: 22.88 KG/M2 | WEIGHT: 134 LBS | TEMPERATURE: 97.8 F | DIASTOLIC BLOOD PRESSURE: 72 MMHG

## 2023-01-20 DIAGNOSIS — J01.90 ACUTE SINUSITIS WITH SYMPTOMS > 10 DAYS: Primary | ICD-10-CM

## 2023-01-20 PROCEDURE — 99213 OFFICE O/P EST LOW 20 MIN: CPT | Performed by: NURSE PRACTITIONER

## 2023-01-20 NOTE — PROGRESS NOTES
Assessment & Plan     Heidi was seen today for sinus problem.    Diagnoses and all orders for this visit:    Acute sinusitis with symptoms > 10 days  Start with trial of sinus saline rinses twice daily and addition of Flonase.  With no improvement or worsening may initiate antibiotic regimen.    -     amoxicillin-clavulanate (AUGMENTIN) 875-125 MG tablet; Take 1 tablet by mouth 2 times daily for 7 days        Return in about 2 weeks (around 2/3/2023) for No improvement or sooner with worsening symptoms.    Deepa Newman, ADDIS[facility]   Heidi is a 22 year old, presenting for the following health issues:  Sinus Problem    Sinus Problem     History of Present Illness       Reason for visit:  Sinus infection  Symptom onset:  3-4 weeks ago  Symptoms include:  Congestion, ear pain, cough  Symptom intensity:  Moderate  Symptom progression:  Staying the same  Had these symptoms before:  Yes  Has tried/received treatment for these symptoms:  Yes  Previous treatment was successful:  Yes  Prior treatment description:  Antibiotics  What makes it worse:  No  What makes it better:  Cold meds    She eats 2-3 servings of fruits and vegetables daily.She consumes 1 sweetened beverage(s) daily.She exercises with enough effort to increase her heart rate 30 to 60 minutes per day.  She exercises with enough effort to increase her heart rate 3 or less days per week.   She is taking medications regularly.     Onset of symptoms 3 weeks ago with sore throat and congestion.  Runny nose.  Sore throat resolved after about 5 days.   Negative COVID-19 tests.  Progressed to more nasal and sinus congestion and added on cough.  Ears feel congested.  Sinus pressure and headaches, feels it in the top of her teeth.      History of sinus infection in the fall.      Headache today - forehead and behind eyes  Cough - Dry and feels like productive - nothing comes up  Ear - Bilateral - pressure, aches,  "crackling  Top teeth hurt at times        Review of Systems     Constitutional, HEENT, cardiovascular, pulmonary, gi and gu systems are negative, except as otherwise noted.      Objective    /72 (BP Location: Left arm, Patient Position: Chair, Cuff Size: Adult Regular)   Pulse 77   Temp 97.8  F (36.6  C) (Tympanic)   Ht 1.631 m (5' 4.2\")   Wt 60.8 kg (134 lb)   LMP 01/12/2023 (Exact Date)   SpO2 100%   BMI 22.86 kg/m    Body mass index is 22.86 kg/m .     Physical Exam     GENERAL: healthy, alert and no distress  EYES: Eyes grossly normal to inspection  HENT: ear canals and TM's normal, nose and mouth without ulcers or lesions  NECK: no adenopathy, no asymmetry  RESP: lungs clear to auscultation - no rales, rhonchi or wheezes  CV: regular rate and rhythm, normal S1 S2  PSYCH: mentation appears normal, affect normal/bright                "

## 2023-02-15 ENCOUNTER — MYC REFILL (OUTPATIENT)
Dept: ENDOCRINOLOGY | Facility: CLINIC | Age: 23
End: 2023-02-15
Payer: COMMERCIAL

## 2023-02-15 DIAGNOSIS — E10.65 TYPE 1 DIABETES MELLITUS WITH HYPERGLYCEMIA (H): ICD-10-CM

## 2023-02-16 RX ORDER — INSULIN LISPRO 100 [IU]/ML
INJECTION, SOLUTION INTRAVENOUS; SUBCUTANEOUS
Qty: 60 ML | Refills: 0 | Status: SHIPPED | OUTPATIENT
Start: 2023-02-16 | End: 2023-04-25

## 2023-02-16 RX ORDER — INSULIN LISPRO 100 [IU]/ML
INJECTION, SOLUTION INTRAVENOUS; SUBCUTANEOUS
Qty: 15 ML | Refills: 0 | Status: SHIPPED | OUTPATIENT
Start: 2023-02-16 | End: 2023-06-14

## 2023-03-06 ENCOUNTER — MYC REFILL (OUTPATIENT)
Dept: ENDOCRINOLOGY | Facility: CLINIC | Age: 23
End: 2023-03-06
Payer: COMMERCIAL

## 2023-03-06 DIAGNOSIS — E10.65 TYPE 1 DIABETES MELLITUS WITH HYPERGLYCEMIA (H): ICD-10-CM

## 2023-03-07 RX ORDER — PROCHLORPERAZINE 25 MG/1
1 SUPPOSITORY RECTAL
Qty: 9 EACH | Refills: 0 | Status: SHIPPED | OUTPATIENT
Start: 2023-03-07 | End: 2023-07-10

## 2023-03-09 ENCOUNTER — MYC REFILL (OUTPATIENT)
Dept: ENDOCRINOLOGY | Facility: CLINIC | Age: 23
End: 2023-03-09
Payer: COMMERCIAL

## 2023-03-09 DIAGNOSIS — E10.65 TYPE 1 DIABETES MELLITUS WITH HYPERGLYCEMIA (H): ICD-10-CM

## 2023-03-13 RX ORDER — BLOOD SUGAR DIAGNOSTIC
STRIP MISCELLANEOUS
Qty: 750 STRIP | Refills: 0 | Status: SHIPPED | OUTPATIENT
Start: 2023-03-13

## 2023-04-11 DIAGNOSIS — E10.65 TYPE 1 DIABETES MELLITUS WITH HYPERGLYCEMIA (H): ICD-10-CM

## 2023-04-12 RX ORDER — PEN NEEDLE, DIABETIC 32GX 5/32"
NEEDLE, DISPOSABLE MISCELLANEOUS
Qty: 720 EACH | Refills: 0 | Status: SHIPPED | OUTPATIENT
Start: 2023-04-12 | End: 2023-07-11

## 2023-04-24 DIAGNOSIS — E10.65 TYPE 1 DIABETES MELLITUS WITH HYPERGLYCEMIA (H): ICD-10-CM

## 2023-04-25 RX ORDER — INSULIN LISPRO 100 [IU]/ML
INJECTION, SOLUTION INTRAVENOUS; SUBCUTANEOUS
Qty: 60 ML | Refills: 0 | Status: SHIPPED | OUTPATIENT
Start: 2023-04-25 | End: 2023-10-18

## 2023-04-25 NOTE — TELEPHONE ENCOUNTER
"Requested Prescriptions   Pending Prescriptions Disp Refills     insulin lispro (HUMALOG KWIKPEN) 100 UNIT/ML (1 unit dial) KWIKPEN [Pharmacy Med Name: HUMALOG KWIKPEN U100 3ML 5'S 100U/ML] 60 mL 3     Sig: INJECT UP TO 70 UNITS PER DAY       Short Acting Insulin Protocol Passed - 4/24/2023 11:49 PM        Passed - Serum creatinine on file in past 12 months     Recent Labs   Lab Test 05/25/22  1513   CR 0.72       Ok to refill medication if creatinine is low          Passed - HgbA1C in past 3 or 6 months     If HgbA1C is 8 or greater, it needs to be on file within the past 3 months.  If less than 8, must be on file within the past 6 months.     Recent Labs   Lab Test 01/04/23  0818   A1C 6.5*             Passed - Medication is active on med list        Passed - Patient is age 18 or older        Passed - Recent (6 mo) or future (30 days) visit within the authorizing provider's specialty     Patient had office visit in the last 6 months or has a visit in the next 30 days with authorizing provider or within the authorizing provider's specialty.  See \"Patient Info\" tab in inbasket, or \"Choose Columns\" in Meds & Orders section of the refill encounter.                 "

## 2023-05-14 ENCOUNTER — TRANSFERRED RECORDS (OUTPATIENT)
Dept: MULTI SPECIALTY CLINIC | Facility: CLINIC | Age: 23
End: 2023-05-14

## 2023-05-14 LAB — RETINOPATHY: NORMAL

## 2023-05-31 DIAGNOSIS — E10.65 TYPE 1 DIABETES MELLITUS WITH HYPERGLYCEMIA (H): Primary | ICD-10-CM

## 2023-06-14 ENCOUNTER — LAB (OUTPATIENT)
Dept: LAB | Facility: CLINIC | Age: 23
End: 2023-06-14
Payer: COMMERCIAL

## 2023-06-14 ENCOUNTER — OFFICE VISIT (OUTPATIENT)
Dept: ENDOCRINOLOGY | Facility: CLINIC | Age: 23
End: 2023-06-14
Payer: COMMERCIAL

## 2023-06-14 VITALS
BODY MASS INDEX: 23.37 KG/M2 | HEART RATE: 78 BPM | SYSTOLIC BLOOD PRESSURE: 117 MMHG | TEMPERATURE: 97.5 F | DIASTOLIC BLOOD PRESSURE: 77 MMHG | OXYGEN SATURATION: 100 % | HEIGHT: 64 IN | WEIGHT: 136.9 LBS | RESPIRATION RATE: 16 BRPM

## 2023-06-14 DIAGNOSIS — E10.649 TYPE 1 DIABETES MELLITUS WITH HYPOGLYCEMIA AND WITHOUT COMA (H): ICD-10-CM

## 2023-06-14 DIAGNOSIS — E10.65 TYPE 1 DIABETES MELLITUS WITH HYPERGLYCEMIA (H): ICD-10-CM

## 2023-06-14 DIAGNOSIS — E10.65 TYPE 1 DIABETES MELLITUS WITH HYPERGLYCEMIA (H): Primary | ICD-10-CM

## 2023-06-14 LAB
CREAT UR-MCNC: 10.3 MG/DL
HBA1C MFR BLD: 6.3 % (ref 0–5.6)
MICROALBUMIN UR-MCNC: <12 MG/L
MICROALBUMIN/CREAT UR: NORMAL MG/G{CREAT}

## 2023-06-14 PROCEDURE — 83036 HEMOGLOBIN GLYCOSYLATED A1C: CPT

## 2023-06-14 PROCEDURE — 82570 ASSAY OF URINE CREATININE: CPT

## 2023-06-14 PROCEDURE — 82043 UR ALBUMIN QUANTITATIVE: CPT

## 2023-06-14 PROCEDURE — 95251 CONT GLUC MNTR ANALYSIS I&R: CPT | Performed by: INTERNAL MEDICINE

## 2023-06-14 PROCEDURE — 36415 COLL VENOUS BLD VENIPUNCTURE: CPT

## 2023-06-14 PROCEDURE — 99214 OFFICE O/P EST MOD 30 MIN: CPT | Performed by: INTERNAL MEDICINE

## 2023-06-14 RX ORDER — INSULIN GLARGINE-YFGN 100 [IU]/ML
18 INJECTION, SOLUTION SUBCUTANEOUS DAILY
Qty: 15 ML | Refills: 1 | Status: SHIPPED | OUTPATIENT
Start: 2023-06-14 | End: 2023-10-18

## 2023-06-14 NOTE — NURSING NOTE
ENDOCRINOLOGY INTAKE FORM    Patient Name:  Heidi Madden  :  2000    Is patient Diabetic?   Yes: type 1  Does patient have non-diabetic or other endocrine issues?  Yes: Abnormal finding on thyroid function test    Vitals: There were no vitals taken for this visit.  BMI= There is no height or weight on file to calculate BMI.    Flu vaccine:  Yes:   Pneumonia vaccine:  Yes: 13 x 1, 23 x 1    Smoking and Alcohol use:  Social History     Tobacco Use     Smoking status: Never     Smokeless tobacco: Never     Tobacco comments:     No one in family smokes.   Substance Use Topics     Alcohol use: No     Drug use: No       Foot Exam: No  Eye Exam:  Yes:   Dental Exam:    Aspirin Use:  No    Lab Results   Component Value Date    A1C 6.3 2023    A1C 6.5 2023    A1C 6.1 2022    A1C 5.5 12/10/2019    A1C 6.1 09/10/2019    A1C 6.1 2019    A1C 5.7 2019    A1C 6.0 2018       Lab Results   Component Value Date    MICROL <5 2022     No results found for: MICROALBUMIN    Nydia Kline CMA  [facility] [place]  [phone]

## 2023-06-14 NOTE — PATIENT INSTRUCTIONS
[facility]  Dr Fox, Endocrinology Department    [facility]   [address] [facility]  [place] [address]  Appointment [phone]  Fax: [phone]  [place]: Monday - Thursday      Please check the cost coverage and copay with insurance before recommended tests, services and medications (especially if new medications are prescribed).     If ordered, please get blood work done 1 week prior to your next appointment so they will be available to Dr. Fox at your visit.    To provide the best diabetic care, please bring your blood glucose meter to each and every visit with your  Endocrinologist. Your blood glucose CGM/meter/insulin pump will be downloaded at every appointment.  Please arrive 15 minutes before your scheduled appointment. This will allow for your blood glucose CGM/meter/insulin pump  to be downloaded.  If you are wearing DEXCOM please bring  or sharing code from the Dexcom Clarity Gonzalez so that it can be downloaded.  If you are using [employer] personal sensors please bring the reader.    Continue Semglee: 18 units/day  Humalog:  take I:C as noted below + sliding scale insulin 1 unit 50 > 150.  BF: 1 unit/ 5 gm of carbs  Lunch: 1 unit/ 7 gm of carbs  Dinner: 1unit/7 gm of carbs    Avoid overcorrection for high Blood glucose  Continue to use DEXCOM  Follow up with opthalmology  Labs in 4 months.  Please make a lab appointment for blood work and follow up clinic appointment in 1 week after that to discuss results.    Recommend checking blood sugars before meals and at bedtime.    If Blood glucose are low more often-> 2-3 times/week- give us a call.  Make better food choices: reduce carbs, Reduce portion size, weight loss and exercise 3-4 times a week.    What is hypoglycemia:  Hypoglycemia is when blood sugar levels become too low - below 70 m/dl.      What causes hypoglycemia?  - using too much insulin  -taking too many diabetes  pills  -not eating enough, or skipping meals or snacks  -not eating enough carbohydrate with meals  -changing your exercise routine  -drinking alcohol in excess    It is also possible to have hypoglycemia even when you are carefully managing your blood sugar levels.    What does it feel like when blood sugars get too low?  You may feel:  - anxious  -confused  -dizzy  -hungry  -light-headed  -nervous  -shaky  -sleepy  -sweaty    You may have  -blurred or cloudy vision  -heart palpitations (heart skips a beat or races)  -tingling or numbness around the mouth and tongue  -tremors    What to do if you have symptoms of hypoglycmemia:  If you think your blood sugar is too low, check it with a glucose meter.  If its below 70 mg/dl, consume one of the following:  Fruit juice (1/2 cup)  Glucose tablets (15 grams)  Hard candy (5 to 7 pieces)  Honey or sugar (2 teaspoons)  Milk (1/2 cup)  Soft drink (non-diet, 1/2 cup)    Wait 15 minutes and check your blood glucose again.  IF it is still below 70 mg/dl, have another food item listed above. Wait another 15 minutes and repeat the blood glucose test.  Have a small meal or snack that contains some carbohydrate after your blood glucose rises above 70 mg/dl.    If you are at risk of hypoglycemia, always carry with you glucose tablets or one of the foods listed above.      To prevent Hypoglycemia:  Avoid situations that may cause hypoglycemia  Before making any change to your diet or exercise routine, discuss them with your healthcare provider  Keep a record of your blood glucose levels.  Include the time of day, diabetes medications, when you had your last meal or snack, and what you were doing at the time (e.g. Watching TV, gardening, jogging, etc).    Talk to your healthcare provider if your blood glucose levels are often low        Patient guide on hypoglycemia    [email or web address]-[email or web address]-[email or web address]

## 2023-06-14 NOTE — LETTER
2023         RE: Heidi Madden  [address] [place] [address]        Dear Colleague,    Thank you for referring your patient, Heidi Madden, to the [facility]. Please see a copy of my visit note below.    Endocrinology Clinic Note:  Name: Heidi Madden  Seen for f/u of type 1 Diabetes.  HPI:  Heidi Madden is a 21 year old female who presents for the evaluation/management of type 1 diabetes.  Was seen by pediatric endocrinologist  before.    Using MDI and Dexcom.  She is not interested in pump at this time but wants to think about it.    She graduated this year and will be starting job at [employer].  Living with mother and sister.      Wt Readings from Last 2 Encounters:   23 62.1 kg (136 lb 14.4 oz)   23 60.8 kg (134 lb)        1. Type 1 DM:  Orginally diagnosed: 3/16/2018  Hospitalization for DKA: no  Current Regimen:   Semglee: 18 units/day  Humalo unit/ 6-8 gm of Carbs + sliding scale insulin 1 unit 50 > 150.  BF: 1 unit/6  Lunch: 1 unit/8  Dinner: 1unit/7  Typically takes about 10-20 TID.  ( mostly estimating)    yes:     Diabetes Medication(s)     Diabetic Other       Glucagon (BAQSIMI TWO PACK) 3 MG/DOSE POWD    Spray 1 spray (3 mg) in nostril as needed (hypoglycemic siezure or unconsciousness)    Insulin       Insulin Glargine-yfgn (SEMGLEE, YFGN,) 100 UNIT/ML SOPN    Inject 16 Units Subcutaneous daily     insulin lispro (HUMALOG KWIKPEN) 100 UNIT/ML (1 unit dial) KWIKPEN    INJECT UP TO 70 UNITS PER DAY     insulin lispro (HUMALOG KWIKPEN) 100 UNIT/ML (1 unit dial) KWIKPEN    The patient uses up to 70 units per day.          BS checks: dexcom  Average Meter Download: Blood glucose data reviewed personally. See nursing note from this encounter for details.  Blood sugars are mostly in acceptable range.  Exercise: walks/ bike ride everyday.  Last A1c: 6.3%  Symptoms of hypoglycemia (low blood sugar): yes  Using PUMP: No      DM  Complications:   Complications:   Diabetes Complications  Description / Detail    Diabetic Retinopathy  No   CAD / PAD  No   Neuropathy  No   Nephropathy / Microalbuminuria  No  No results found for: UMALCR      Gastroparesis  No   Hypoglycemia Unawarness  No       2. Hypertension: Blood Pressure today:   BP Readings from Last 3 Encounters:   01/20/23 130/72   09/15/22 118/76   05/25/22 120/75     3. Hyperlipidemia:  On medication    PMH/PSH:  History reviewed. No pertinent past medical history.  Past Surgical History:   Procedure Laterality Date     C REIMPLANT URETER,VESICOPSOAS HITCH  01/05     Family Hx:  Family History   Problem Relation Age of Onset     Thyroid Disease Mother         hashimoyoto'd     Family History Negative Father      Lipids Father      Psychotic Disorder Father         anger mood swings     Lipids Maternal Grandmother      Hypertension Maternal Grandfather      Diabetes Paternal Grandmother      Thyroid Disease Paternal Grandmother         hypo, partial thyroidectomy     Circulatory Paternal Grandmother         coroid arteries clogged, scraped     Heart Disease Paternal Grandfather         Angioplasty     Alcohol/Drug Paternal Grandfather      Family History Negative Sister      Family History Negative Sister      Lipids Sister         as early teen     Psychotic Disorder Other         bipolar  2 first cousins     Cancer No family hx of         sno cancer. maternal uncle           DM2: aunt, uncle and pgm.           Social Hx:  Social History     Socioeconomic History     Marital status: Single     Spouse name: Not on file     Number of children: Not on file     Years of education: Not on file     Highest education level: Not on file   Occupational History     Not on file   Tobacco Use     Smoking status: Never     Smokeless tobacco: Never     Tobacco comments:     No one in family smokes.   Vaping Use     Vaping status: Not on file   Substance and Sexual Activity     Alcohol use: No      "Drug use: No     Sexual activity: Never     Comment: constantine march 2018   Other Topics Concern     Not on file   Social History Narrative     Not on file     Social Determinants of Health     Financial Resource Strain: Not on file   Food Insecurity: Not on file   Transportation Needs: Not on file   Physical Activity: Not on file   Stress: Not on file   Social Connections: Not on file   Intimate Partner Violence: Not on file   Housing Stability: Not on file          MEDICATIONS:  has a current medication list which includes the following prescription(s): aspirin not prescribed, bd lynda u/f, dexcom g6 , dexcom g6 sensor, dexcom g6 transmitter, baqsimi two pack, insulin glargine-yfgn, insulin lispro, insulin lispro, onetouch delica lancets 33g, onetouch verio iq, statin not prescribed, and urine glucose-ketones test.    ROS     ROS: 10 point ROS neg other than the symptoms noted above in the HPI.    Physical Exam   VS: /77 (BP Location: Left arm, Patient Position: Chair, Cuff Size: Adult Regular)   Pulse 78   Temp 97.5  F (36.4  C) (Tympanic)   Resp 16   Ht 1.631 m (5' 4.21\")   Wt 62.1 kg (136 lb 14.4 oz)   LMP 05/31/2023 (Approximate)   SpO2 100%   Breastfeeding No   BMI 23.34 kg/m    GENERAL: healthy, alert and no distress  EYES: Eyes grossly normal to inspection, conjunctivae and sclerae normal  ENT: no nose swelling, nasal discharge.  Thyroid: no apparent thyroid nodules  RESP: no audible wheeze, cough, or visible cyanosis.  No visible retractions or increased work of breathing.  Able to speak fully in complete sentences.  ABDO: not evaluated.  EXTREMITIES: no hand tremors.  NEURO: Cranial nerves grossly intact, mentation intact and speech normal  SKIN: No apparent skin lesions, rash or edema seen   PSYCH: mentation appears normal, affect normal/bright, judgement and insight intact, normal speech and appearance well-groomed  DM FOOT EXAM: normal DP and PT pulses, no trophic changes or ulcerative " lesions, normal sensory exam and normal monofilament exam.     LABS:  A1c:  Lab Results   Component Value Date    A1C 6.3 06/14/2023    A1C 6.5 01/04/2023    A1C 6.1 05/25/2022    A1C 5.5 12/10/2019    A1C 6.1 09/10/2019    A1C 6.1 06/18/2019    A1C 5.7 02/19/2019    A1C 6.0 11/20/2018     BMP:  Creatinine   Date Value Ref Range Status   05/25/2022 0.72 0.52 - 1.04 mg/dL Final   03/16/2018 0.56 0.50 - 1.00 mg/dL Final       Urine Micro:  Lab Results   Component Value Date    UMALCR  05/25/2022      Comment:      Unable to calculate:  Urine creatinine or albumin value below detectable level        LFTs/Lipids:  Recent Labs   Lab Test 01/04/23  0818 03/15/19  0909   CHOL 168 176*   HDL 73 55   LDL 86 109   TRIG 47 58       TFTs:  TSH   Date Value Ref Range Status   05/25/2022 1.82 0.40 - 4.00 mU/L Final   11/23/2018 3.52 0.40 - 4.00 mU/L Final       Blood Glucose and pump data/ Meter reviewed.     All pertinent notes, labs, and images personally reviewed by me.       Glucometer/ insulin pump (if applicable)/ CGM data (if applicable) downloaded, Personally reviewed and interpreted.  All Blood sugar data reviewed personally and interpreted as well as discussed with pt.  All past medical, social and Family history reviewed and updated in [facility].    A/P  Ms.Abby TYESHA Madden is a 22 year old here for the evaluation/management of diabetes:    1. DM1 - Under Fair control.  A1c 6.3%.  No known diabetes related complications.  She is using multiple injections plus dexcom.  She is not interested in insulin pump at this time.  In general blood sugars are in acceptable range.  Plan:  Discussed diagnosis, pathophysiology, management and treatment options of condition with pt.  I also discussed importance of strict blood sugar control to prevent complications associated with uncontrolled diabetes.  Continue Semglee: 18 units/day  Humalog:  take I:C as noted below + sliding scale insulin 1 unit 50 > 150.  BF: 1 unit/ 5 gm of  carbs  Lunch: 1 unit/ 7 gm of carbs  Dinner: 1unit/7 gm of carbs  Avoid overcorrection for high Blood glucose  Continue to use DEXCOM  Follow up with opthalmology  Labs in 4 months.  Please make a lab appointment for blood work and follow up clinic appointment in 1 week after that to discuss results.         2. Hypertension - Under Good  control. Not on medication.    3. Hyperlipidemia - Under Good control. Not on medication.  4. Prevention  Ophthalmology-recommend annually  ASA-not indicated secondary to age  Smoking-non-smoker    Foot exam- 6/14/2023      Most Recent Immunizations   Administered Date(s) Administered     COVID-19 Monovalent 18+ (Moderna) 05/18/2021     Comvax (HIB/HepB) 07/11/2001     DTAP (<7y) 06/29/2004     HEPA 03/26/2013     HPV 03/26/2013     Influenza Intranasal Vaccine 09/21/2013     Influenza Vaccine >6 months (Alfuria,Fluzone) 10/10/2022     Influenza Vaccine, 6+MO IM (QUADRIVALENT W/PRESERVATIVES) 10/15/2019     MMR 06/29/2004     Meningococcal ACWY (Menactra ) 07/11/2016     Nasal Influenza Vaccine 2-49 (FluMist) 11/23/2018     Pneumo Conj 13-V (2010&after) 11/14/2019     Pneumococcal (PCV 7) 07/11/2001     Pneumococcal 23 valent 01/16/2020     Poliovirus, inactivated (IPV) 06/29/2004     TDAP (Adacel,Boostrix) 05/24/2022     TDAP Vaccine (Boostrix) 06/27/2011     Typhoid Oral 03/27/2017     Varicella 06/27/2011         Recommend checking blood sugars before meals and at bedtime.    If Blood glucose are low more often-> 2-3 times/week- give us a call.  The patient is advised to Make better food choices: reduce carbs, Reduce portion size, weight loss and exercise 3-4 times a week.  Discussed hypoglycemia signs and symptoms as well as management in detail.      There is some variability among people, most will usually develop symptoms suggestive of hypoglycemia when blood glucose levels are lowered to the mid 60's. The first set of symptoms are called adrenergic. Patients may experience  any of the following nervousness, sweating, intense hunger, trembling, weakness, palpitations, and difficulty speaking. When BS fall below 50 the patient is unable to talk and take oral therapy.  Would recommend Glucagon emergency kit for the patient and education for family and friends around the patient.   The acute management of hypoglycemia involves the rapid delivery of a source of easily absorbed sugar. Regular soda, juice, lifesavers, table sugar, are good options. 15 grams of glucose is the dose that is given, followed by an assessment of symptoms and a blood glucose check if possible. If after 10 minutes there is no improvement, another 10-15 grams should be given. This can be repeated up to three times. The equivalency of 10-15 grams of glucose (approximate servings) are: Four lifesavers, 4 teaspoons of sugar, or 1/2 cup or 4 oz of juice or regular pop.    Follow-up:  4 months.    Zakia Fox M.D  Endocrinology  [facility]/[place]  CC: No Ref-Primary, Physician      All questions were answered.  The patient indicates understanding of the above issues and agrees with the plan set forth.     Disclaimer: This note consists of symbols derived from keyboarding, dictation and/or voice recognition software. As a result, there may be errors in the script that have gone undetected. Please consider this when interpreting information found in this chart.    Addendum to above note and clinic visit:    Labs reviewed.    See result note/telephone encounter.              Again, thank you for allowing me to participate in the care of your patient.        Sincerely,        Zakia Fox MD

## 2023-06-14 NOTE — Clinical Note
Please abstract the following data from this visit with this patient into the appropriate field in Epic:  Tests that can be patient reported without a hard copy:  Eye exam with ophthalmology on this date: 05/14/23 Exam Location: [facility] in [place], [place]  Other Tests found in the patient's chart through Chart Review/Care Everywhere:    Note to Abstraction: If this section is blank, no results were found via Chart Review/Care Everywhere.

## 2023-06-14 NOTE — PROGRESS NOTES
Endocrinology Clinic Note:  Name: Heidi Madden  Seen for f/u of type 1 Diabetes.  HPI:  Heidi Madden is a 21 year old female who presents for the evaluation/management of type 1 diabetes.  Was seen by pediatric endocrinologist  before.    Using MDI and Dexcom.  She is not interested in pump at this time but wants to think about it.    She graduated this year and will be starting job at [email or web address]  Living with mother and sister.      Wt Readings from Last 2 Encounters:   23 62.1 kg (136 lb 14.4 oz)   23 60.8 kg (134 lb)        1. Type 1 DM:  Orginally diagnosed: 3/16/2018  Hospitalization for DKA: no  Current Regimen:   Semglee: 18 units/day  Humalo unit/ 6-8 gm of Carbs + sliding scale insulin 1 unit 50 > 150.  BF: 1 unit/6  Lunch: 1 unit/8  Dinner: 1unit/7  Typically takes about 10-20 TID.  ( mostly estimating)    yes:     Diabetes Medication(s)     Diabetic Other       Glucagon (BAQSIMI TWO PACK) 3 MG/DOSE POWD    Spray 1 spray (3 mg) in nostril as needed (hypoglycemic siezure or unconsciousness)    Insulin       Insulin Glargine-yfgn (SEMGLEE, YFGN,) 100 UNIT/ML SOPN    Inject 16 Units Subcutaneous daily     insulin lispro (HUMALOG KWIKPEN) 100 UNIT/ML (1 unit dial) KWIKPEN    INJECT UP TO 70 UNITS PER DAY     insulin lispro (HUMALOG KWIKPEN) 100 UNIT/ML (1 unit dial) KWIKPEN    The patient uses up to 70 units per day.          BS checks: dexcom  Average Meter Download: Blood glucose data reviewed personally. See nursing note from this encounter for details.  Blood sugars are mostly in acceptable range.  Exercise: walks/ bike ride everyday.  Last A1c: 6.3%  Symptoms of hypoglycemia (low blood sugar): yes  Using PUMP: No      DM Complications:   Complications:   Diabetes Complications  Description / Detail    Diabetic Retinopathy  No   CAD / PAD  No   Neuropathy  No   Nephropathy / Microalbuminuria  No  No results found for: UMALCR      Gastroparesis  No   Hypoglycemia  Unawarness  No       2. Hypertension: Blood Pressure today:   BP Readings from Last 3 Encounters:   01/20/23 130/72   09/15/22 118/76   05/25/22 120/75     3. Hyperlipidemia:  On medication    PMH/PSH:  History reviewed. No pertinent past medical history.  Past Surgical History:   Procedure Laterality Date     ZZC REIMPLANT URETER,VESICOPSOAS HITCH  01/05     Family Hx:  Family History   Problem Relation Age of Onset     Thyroid Disease Mother         hashimoyoto'd     Family History Negative Father      Lipids Father      Psychotic Disorder Father         anger mood swings     Lipids Maternal Grandmother      Hypertension Maternal Grandfather      Diabetes Paternal Grandmother      Thyroid Disease Paternal Grandmother         hypo, partial thyroidectomy     Circulatory Paternal Grandmother         coroid arteries clogged, scraped     Heart Disease Paternal Grandfather         Angioplasty     Alcohol/Drug Paternal Grandfather      Family History Negative Sister      Family History Negative Sister      Lipids Sister         as early teen     Psychotic Disorder Other         bipolar  2 first cousins     Cancer No family hx of         sno cancer. maternal uncle           DM2: aunt, uncle and pgm.           Social Hx:  Social History     Socioeconomic History     Marital status: Single     Spouse name: Not on file     Number of children: Not on file     Years of education: Not on file     Highest education level: Not on file   Occupational History     Not on file   Tobacco Use     Smoking status: Never     Smokeless tobacco: Never     Tobacco comments:     No one in family smokes.   Vaping Use     Vaping status: Not on file   Substance and Sexual Activity     Alcohol use: No     Drug use: No     Sexual activity: Never     Comment: constantine march 2018   Other Topics Concern     Not on file   Social History Narrative     Not on file     Social Determinants of Health     Financial Resource Strain: Not on file   Food  "Insecurity: Not on file   Transportation Needs: Not on file   Physical Activity: Not on file   Stress: Not on file   Social Connections: Not on file   Intimate Partner Violence: Not on file   Housing Stability: Not on file          MEDICATIONS:  has a current medication list which includes the following prescription(s): aspirin not prescribed, bd lynda u/f, dexcom g6 , dexcom g6 sensor, dexcom g6 transmitter, baqsimi two pack, insulin glargine-yfgn, insulin lispro, insulin lispro, onetouch delica lancets 33g, onetouch verio iq, statin not prescribed, and urine glucose-ketones test.    ROS     ROS: 10 point ROS neg other than the symptoms noted above in the HPI.    Physical Exam   VS: /77 (BP Location: Left arm, Patient Position: Chair, Cuff Size: Adult Regular)   Pulse 78   Temp 97.5  F (36.4  C) (Tympanic)   Resp 16   Ht 1.631 m (5' 4.21\")   Wt 62.1 kg (136 lb 14.4 oz)   LMP 05/31/2023 (Approximate)   SpO2 100%   Breastfeeding No   BMI 23.34 kg/m    GENERAL: healthy, alert and no distress  EYES: Eyes grossly normal to inspection, conjunctivae and sclerae normal  ENT: no nose swelling, nasal discharge.  Thyroid: no apparent thyroid nodules  RESP: no audible wheeze, cough, or visible cyanosis.  No visible retractions or increased work of breathing.  Able to speak fully in complete sentences.  ABDO: not evaluated.  EXTREMITIES: no hand tremors.  NEURO: Cranial nerves grossly intact, mentation intact and speech normal  SKIN: No apparent skin lesions, rash or edema seen   PSYCH: mentation appears normal, affect normal/bright, judgement and insight intact, normal speech and appearance well-groomed  DM FOOT EXAM: normal DP and PT pulses, no trophic changes or ulcerative lesions, normal sensory exam and normal monofilament exam.     LABS:  A1c:  Lab Results   Component Value Date    A1C 6.3 06/14/2023    A1C 6.5 01/04/2023    A1C 6.1 05/25/2022    A1C 5.5 12/10/2019    A1C 6.1 09/10/2019    A1C 6.1 " 06/18/2019    A1C 5.7 02/19/2019    A1C 6.0 11/20/2018     BMP:  Creatinine   Date Value Ref Range Status   05/25/2022 0.72 0.52 - 1.04 mg/dL Final   03/16/2018 0.56 0.50 - 1.00 mg/dL Final       Urine Micro:  Lab Results   Component Value Date    UMALCR  05/25/2022      Comment:      Unable to calculate:  Urine creatinine or albumin value below detectable level        LFTs/Lipids:  Recent Labs   Lab Test 01/04/23  0818 03/15/19  0909   CHOL 168 176*   HDL 73 55   LDL 86 109   TRIG 47 58       TFTs:  TSH   Date Value Ref Range Status   05/25/2022 1.82 0.40 - 4.00 mU/L Final   11/23/2018 3.52 0.40 - 4.00 mU/L Final       Blood Glucose and pump data/ Meter reviewed.     All pertinent notes, labs, and images personally reviewed by me.       Glucometer/ insulin pump (if applicable)/ CGM data (if applicable) downloaded, Personally reviewed and interpreted.  All Blood sugar data reviewed personally and interpreted as well as discussed with pt.  All past medical, social and Family history reviewed and updated in [facility].    A/P  Ms.Abby TYESHA Madden is a 22 year old here for the evaluation/management of diabetes:    1. DM1 - Under Fair control.  A1c 6.3%.  No known diabetes related complications.  She is using multiple injections plus dexcom.  She is not interested in insulin pump at this time.  In general blood sugars are in acceptable range.  Plan:  Discussed diagnosis, pathophysiology, management and treatment options of condition with pt.  I also discussed importance of strict blood sugar control to prevent complications associated with uncontrolled diabetes.  Continue Semglee: 18 units/day  Humalog:  take I:C as noted below + sliding scale insulin 1 unit 50 > 150.  BF: 1 unit/ 5 gm of carbs  Lunch: 1 unit/ 7 gm of carbs  Dinner: 1unit/7 gm of carbs  Avoid overcorrection for high Blood glucose  Continue to use DEXCOM  Follow up with opthalmology  Labs in 4 months.  Please make a lab appointment for blood work and follow up  clinic appointment in 1 week after that to discuss results.         2. Hypertension - Under Good  control. Not on medication.    3. Hyperlipidemia - Under Good control. Not on medication.  4. Prevention  Ophthalmology-recommend annually  ASA-not indicated secondary to age  Smoking-non-smoker    Foot exam- 6/14/2023      Most Recent Immunizations   Administered Date(s) Administered     COVID-19 Monovalent 18+ (Moderna) 05/18/2021     Comvax (HIB/HepB) 07/11/2001     DTAP (<7y) 06/29/2004     HEPA 03/26/2013     HPV 03/26/2013     Influenza Intranasal Vaccine 09/21/2013     Influenza Vaccine >6 months (Alfuria,Fluzone) 10/10/2022     Influenza Vaccine, 6+MO IM (QUADRIVALENT W/PRESERVATIVES) 10/15/2019     MMR 06/29/2004     Meningococcal ACWY (Menactra ) 07/11/2016     Nasal Influenza Vaccine 2-49 (FluMist) 11/23/2018     Pneumo Conj 13-V (2010&after) 11/14/2019     Pneumococcal (PCV 7) 07/11/2001     Pneumococcal 23 valent 01/16/2020     Poliovirus, inactivated (IPV) 06/29/2004     TDAP (Adacel,Boostrix) 05/24/2022     TDAP Vaccine (Boostrix) 06/27/2011     Typhoid Oral 03/27/2017     Varicella 06/27/2011         Recommend checking blood sugars before meals and at bedtime.    If Blood glucose are low more often-> 2-3 times/week- give us a call.  The patient is advised to Make better food choices: reduce carbs, Reduce portion size, weight loss and exercise 3-4 times a week.  Discussed hypoglycemia signs and symptoms as well as management in detail.      There is some variability among people, most will usually develop symptoms suggestive of hypoglycemia when blood glucose levels are lowered to the mid 60's. The first set of symptoms are called adrenergic. Patients may experience any of the following nervousness, sweating, intense hunger, trembling, weakness, palpitations, and difficulty speaking. When BS fall below 50 the patient is unable to talk and take oral therapy.  Would recommend Glucagon emergency kit for the  patient and education for family and friends around the patient.   The acute management of hypoglycemia involves the rapid delivery of a source of easily absorbed sugar. Regular soda, juice, lifesavers, table sugar, are good options. 15 grams of glucose is the dose that is given, followed by an assessment of symptoms and a blood glucose check if possible. If after 10 minutes there is no improvement, another 10-15 grams should be given. This can be repeated up to three times. The equivalency of 10-15 grams of glucose (approximate servings) are: Four lifesavers, 4 teaspoons of sugar, or 1/2 cup or 4 oz of juice or regular pop.    Follow-up:  4 months.    Zakia Fox M.D  Endocrinology  [facility]/[place]  CC: No Ref-Primary, Physician      All questions were answered.  The patient indicates understanding of the above issues and agrees with the plan set forth.     Disclaimer: This note consists of symbols derived from keyboarding, dictation and/or voice recognition software. As a result, there may be errors in the script that have gone undetected. Please consider this when interpreting information found in this chart.    Addendum to above note and clinic visit:    Labs reviewed.    See result note/telephone encounter.

## 2023-07-07 ENCOUNTER — TELEPHONE (OUTPATIENT)
Dept: ENDOCRINOLOGY | Facility: CLINIC | Age: 23
End: 2023-07-07
Payer: COMMERCIAL

## 2023-07-07 DIAGNOSIS — E10.65 TYPE 1 DIABETES MELLITUS WITH HYPERGLYCEMIA (H): ICD-10-CM

## 2023-07-07 NOTE — TELEPHONE ENCOUNTER
Patient's MyChart is not working to send a message to provider.  Needs a refill for dexcom G6 sensors to be sent to [facility] home delivery.

## 2023-07-10 DIAGNOSIS — E10.65 TYPE 1 DIABETES MELLITUS WITH HYPERGLYCEMIA (H): ICD-10-CM

## 2023-07-10 RX ORDER — PROCHLORPERAZINE 25 MG/1
1 SUPPOSITORY RECTAL
Qty: 9 EACH | Refills: 1 | Status: SHIPPED | OUTPATIENT
Start: 2023-07-10 | End: 2023-11-06

## 2023-07-11 RX ORDER — PEN NEEDLE, DIABETIC 32GX 5/32"
NEEDLE, DISPOSABLE MISCELLANEOUS
Qty: 800 EACH | Refills: 1 | Status: SHIPPED | OUTPATIENT
Start: 2023-07-11 | End: 2024-01-08

## 2023-07-11 NOTE — TELEPHONE ENCOUNTER
"Requested Prescriptions   Pending Prescriptions Disp Refills     BD KRISTA U/F 32G X 4 MM insulin pen needle [Pharmacy Med Name: BD PEN MERE UF KRISTA 4MM 90'S 32G5/32] 800 each 1     Sig: USE WITH INSULIN PEN 8 TIMES DAILY       Diabetic Supplies Protocol Passed - 7/10/2023 11:38 PM        Passed - Medication is active on med list        Passed - Patient is 18 years of age or older        Passed - Recent (6 mo) or future (30 days) visit within the authorizing provider's specialty     Patient had office visit in the last 6 months or has a visit in the next 30 days with authorizing provider.  See \"Patient Info\" tab in inbasket, or \"Choose Columns\" in Meds & Orders section of the refill encounter.                 "

## 2023-09-12 ENCOUNTER — MYC REFILL (OUTPATIENT)
Dept: ENDOCRINOLOGY | Facility: CLINIC | Age: 23
End: 2023-09-12
Payer: COMMERCIAL

## 2023-09-12 DIAGNOSIS — E10.65 TYPE 1 DIABETES MELLITUS WITH HYPERGLYCEMIA (H): ICD-10-CM

## 2023-09-13 RX ORDER — PROCHLORPERAZINE 25 MG/1
1 SUPPOSITORY RECTAL
Qty: 1 EACH | Refills: 0 | Status: SHIPPED | OUTPATIENT
Start: 2023-09-13 | End: 2023-11-06

## 2023-09-13 NOTE — TELEPHONE ENCOUNTER
"Requested Prescriptions   Signed Prescriptions Disp Refills    Continuous Blood Gluc Transmit (DEXCOM G6 TRANSMITTER) MISC 1 each 0     Si each every 3 months       Diabetic Supplies Protocol Passed - 2023 10:05 PM        Passed - Medication is active on med list        Passed - Patient is 18 years of age or older        Passed - Recent (6 mo) or future (30 days) visit within the authorizing provider's specialty     Patient had office visit in the last 6 months or has a visit in the next 30 days with authorizing provider.  See \"Patient Info\" tab in inbasket, or \"Choose Columns\" in Meds & Orders section of the refill encounter.                 "

## 2023-10-09 NOTE — TELEPHONE ENCOUNTER
I called the patient to verify her current lantus dose so that I could send in a prescription for Semglee today. I left a voice message with my name and the 's number and said to ask them to page me.   77.1

## 2023-10-11 ENCOUNTER — LAB (OUTPATIENT)
Dept: LAB | Facility: CLINIC | Age: 23
End: 2023-10-11
Payer: COMMERCIAL

## 2023-10-11 DIAGNOSIS — E10.65 TYPE 1 DIABETES MELLITUS WITH HYPERGLYCEMIA (H): ICD-10-CM

## 2023-10-11 LAB
CREAT SERPL-MCNC: 0.69 MG/DL (ref 0.51–1.17)
EGFRCR SERPLBLD CKD-EPI 2021: >90 ML/MIN/1.73M2
HBA1C MFR BLD: 6.4 % (ref 0–5.6)
TSH SERPL DL<=0.005 MIU/L-ACNC: 1.85 UIU/ML (ref 0.3–4.2)

## 2023-10-11 PROCEDURE — 84443 ASSAY THYROID STIM HORMONE: CPT

## 2023-10-11 PROCEDURE — 83036 HEMOGLOBIN GLYCOSYLATED A1C: CPT

## 2023-10-11 PROCEDURE — 36415 COLL VENOUS BLD VENIPUNCTURE: CPT

## 2023-10-11 PROCEDURE — 82565 ASSAY OF CREATININE: CPT

## 2023-10-18 ENCOUNTER — OFFICE VISIT (OUTPATIENT)
Dept: ENDOCRINOLOGY | Facility: CLINIC | Age: 23
End: 2023-10-18
Payer: COMMERCIAL

## 2023-10-18 VITALS
TEMPERATURE: 98.4 F | HEIGHT: 64 IN | SYSTOLIC BLOOD PRESSURE: 125 MMHG | DIASTOLIC BLOOD PRESSURE: 78 MMHG | BODY MASS INDEX: 23.68 KG/M2 | OXYGEN SATURATION: 98 % | HEART RATE: 107 BPM | WEIGHT: 138.7 LBS | RESPIRATION RATE: 16 BRPM

## 2023-10-18 DIAGNOSIS — E10.65 TYPE 1 DIABETES MELLITUS WITH HYPERGLYCEMIA (H): Primary | ICD-10-CM

## 2023-10-18 DIAGNOSIS — E10.649 TYPE 1 DIABETES MELLITUS WITH HYPOGLYCEMIA AND WITHOUT COMA (H): ICD-10-CM

## 2023-10-18 PROCEDURE — 99214 OFFICE O/P EST MOD 30 MIN: CPT | Performed by: INTERNAL MEDICINE

## 2023-10-18 PROCEDURE — 99207 PR FOOT EXAM NO CHARGE: CPT | Performed by: INTERNAL MEDICINE

## 2023-10-18 PROCEDURE — 95251 CONT GLUC MNTR ANALYSIS I&R: CPT | Performed by: INTERNAL MEDICINE

## 2023-10-18 RX ORDER — INSULIN GLARGINE-YFGN 100 [IU]/ML
18 INJECTION, SOLUTION SUBCUTANEOUS DAILY
Qty: 30 ML | Refills: 1 | Status: SHIPPED | OUTPATIENT
Start: 2023-10-18 | End: 2024-03-17

## 2023-10-18 RX ORDER — INSULIN LISPRO 100 [IU]/ML
INJECTION, SOLUTION INTRAVENOUS; SUBCUTANEOUS
Qty: 60 ML | Refills: 0 | Status: SHIPPED | OUTPATIENT
Start: 2023-10-18 | End: 2023-12-26

## 2023-10-18 NOTE — LETTER
10/18/2023         RE: Heidi Madden  [address] [place] [address]        Dear Colleague,    Thank you for referring your patient, Heidi Madden, to the [facility]. Please see a copy of my visit note below.    Endocrinology Clinic Note:  Name: Heidi Madden  Seen for f/u of type 1 Diabetes.  HPI:  Heidi Madden is a 23 year old female who presents for the evaluation/management of type 1 diabetes.  Was seen by pediatric endocrinologist  before.    Using MDI and Dexcom.  She is not interested in pump at this time but wants to think about it.    She works at [employer].  Living with mother and sister.    Wt Readings from Last 2 Encounters:   10/18/23 62.9 kg (138 lb 11.2 oz)   23 62.1 kg (136 lb 14.4 oz)        1. Type 1 DM:  Orginally diagnosed: 3/16/2018  Hospitalization for DKA: no  Current Regimen:   Semglee: 18 units/day ( 19 units during periods)  Humalo unit/ 6-8 gm of Carbs + sliding scale insulin 1 unit 50 > 150.  BF: 1 unit/6 gm of carbs  Lunch: 1 unit/7 gm of carbs  Dinner: 1unit/7 gm of carbs  Typically takes about 11-20 TID.  ( mostly estimating)    yes:     Diabetes Medication(s)       Diabetic Other       Glucagon (BAQSIMI TWO PACK) 3 MG/DOSE POWD    Spray 1 spray (3 mg) in nostril as needed (hypoglycemic siezure or unconsciousness)      Insulin       Insulin Glargine-yfgn (SEMGLEE, YFGN,) 100 UNIT/ML SOPN    Inject 18 Units Subcutaneous daily     insulin lispro (HUMALOG KWIKPEN) 100 UNIT/ML (1 unit dial) KWIKPEN    INJECT UP TO 70 UNITS PER DAY            BS checks: dexcom  Average Meter Download: Blood glucose data reviewed personally. See nursing note from this encounter for details.  Blood sugars are mostly in acceptable range.  Exercise: walks/ bike ride everyday.  Last A1c: 6.4%  Symptoms of hypoglycemia (low blood sugar): yes  Using PUMP: No    DM Complications:   Complications:   Diabetes Complications  Description / Detail     Diabetic Retinopathy  No   CAD / PAD  No   Neuropathy  No   Nephropathy / Microalbuminuria  No  No results found for: UMALCR      Gastroparesis  No   Hypoglycemia [place]ar[place]  No       2. Hypertension: Blood Pressure today:   BP Readings from Last 3 Encounters:   10/18/23 125/78   06/14/23 117/77   01/20/23 130/72     3. Hyperlipidemia:  On medication    PMH/PSH:  History reviewed. No pertinent past medical history.  Past Surgical History:   Procedure Laterality Date     ZZC REIMPLANT URETER,VESICOPSOAS HITCH  01/05     Family Hx:  Family History   Problem Relation Age of Onset     Thyroid Disease Mother         hashimoyoto'd     Family History Negative Father      Lipids Father      Psychotic Disorder Father         anger mood swings     Lipids Maternal Grandmother      Hypertension Maternal Grandfather      Diabetes Paternal Grandmother      Thyroid Disease Paternal Grandmother         hypo, partial thyroidectomy     Circulatory Paternal Grandmother         coroid arteries clogged, scraped     Heart Disease Paternal Grandfather         Angioplasty     Alcohol/Drug Paternal Grandfather      Family History Negative Sister      Family History Negative Sister      Lipids Sister         as early teen     Psychotic Disorder Other         bipolar  2 first cousins     Cancer No family hx of         sno cancer. maternal uncle           DM2: aunt, uncle and pgm.           Social Hx:  Social History     Socioeconomic History     Marital status: Single     Spouse name: Not on file     Number of children: Not on file     Years of education: Not on file     Highest education level: Not on file   Occupational History     Not on file   Tobacco Use     Smoking status: Never     Smokeless tobacco: Never     Tobacco comments:     No one in family smokes.   Substance and Sexual Activity     Alcohol use: No     Drug use: No     Sexual activity: Never     Comment: constantine march 2018   Other Topics Concern     Not on file   Social History  "Narrative     Not on file     Social Determinants of Health     Financial Resource Strain: Not on file   Food Insecurity: Not on file   Transportation Needs: Not on file   Physical Activity: Not on file   Stress: Not on file   Social Connections: Not on file   Interpersonal Safety: Not on file   Housing Stability: Not on file          MEDICATIONS:  has a current medication list which includes the following prescription(s): reason aspirin not prescribed (intentional), bd lynda u/f, dexcom g6 , dexcom g6 sensor, dexcom g6 transmitter, baqsimi two pack, insulin glargine-yfgn, insulin lispro, onetouch delica lancets 33g, onetouch verio iq, statin not prescribed, and urine glucose-ketones test.    ROS     ROS: 10 point ROS neg other than the symptoms noted above in the HPI.    Physical Exam   VS: /78 (BP Location: Left arm, Patient Position: Chair, Cuff Size: Adult Regular)   Pulse 107   Temp 98.4  F (36.9  C) (Tympanic)   Resp 16   Ht 1.631 m (5' 4.21\")   Wt 62.9 kg (138 lb 11.2 oz)   LMP 10/13/2023   SpO2 98%   Breastfeeding No   BMI 23.65 kg/m    GENERAL: healthy, alert and no distress  EYES: Eyes grossly normal to inspection, conjunctivae and sclerae normal  ENT: no nose swelling, nasal discharge.  Thyroid: no apparent thyroid nodules  RESP: no audible wheeze, cough, or visible cyanosis.  No visible retractions or increased work of breathing.  Able to speak fully in complete sentences.  ABDO: not evaluated.  EXTREMITIES: no hand tremors.  NEURO: Cranial nerves grossly intact, mentation intact and speech normal  SKIN: No apparent skin lesions, rash or edema seen   PSYCH: mentation appears normal, affect normal/bright, judgement and insight intact, normal speech and appearance well-groomed  DM FOOT EXAM: normal DP and PT pulses, no trophic changes or ulcerative lesions, normal sensory exam and normal monofilament exam.     LABS:  A1c:  Lab Results   Component Value Date    A1C 6.4 10/11/2023    " A1C 6.3 06/14/2023    A1C 6.5 01/04/2023    A1C 6.1 05/25/2022    A1C 5.5 12/10/2019    A1C 6.1 09/10/2019    A1C 6.1 06/18/2019    A1C 5.7 02/19/2019    A1C 6.0 11/20/2018     BMP:  Creatinine   Date Value Ref Range Status   10/11/2023 0.69 0.51 - 1.17 mg/dL Final     Comment:     Male and Female  0-2 Months    0.31-0.88 mg/dL  2-12 Months   0.16-0.39 mg/dL  1-2 Years     0.18-0.35 mg/dL  3-4 Years     0.26-0.42 mg/dL  5-6 Years     0.29-0.47 mg/dL  7-8 Years     0.34-0.53 mg/dL  9-10 Years    0.33-0.64 mg/dL  11-12 Years   0.44-0.68 mg/dL  13-14 Years   0.46-0.77 mg/dL    Female  15 Years and older  0.51-0.95 mg/dL    Male  15 Years and older  0.67-1.17 mg/dL       03/16/2018 0.56 0.50 - 1.00 mg/dL Final     Urine Micro:  Lab Results   Component Value Date    ALCR  06/14/2023      Comment:      Unable to calculate, urine albumin and/or urine creatinine is outside detectable limits.  Microalbuminuria is defined as an albumin:creatinine ratio of 17 to 299 for males and 25 to 299 for females. A ratio of albumin:creatinine of 300 or higher is indicative of overt proteinuria.  Due to biologic variability, positive results should be confirmed by a second, first-morning random or 24-hour timed urine specimen. If there is discrepancy, a third specimen is recommended. When 2 out of 3 results are in the microalbuminuria range, this is evidence for incipient nephropathy and warrants increased efforts at glucose control, blood pressure control, and institution of therapy with an angiotensin-converting-enzyme (ACE) inhibitor (if the patient can tolerate it).      ALCR  05/25/2022      Comment:      Unable to calculate:  Urine creatinine or albumin value below detectable level         LFTs/Lipids:  Recent Labs   Lab Test 01/04/23  0818 03/15/19  0909   CHOL 168 176*   HDL 73 55   LDL 86 109   TRIG 47 58       TFTs:  TSH   Date Value Ref Range Status   10/11/2023 1.85 0.30 - 4.20 uIU/mL Final   05/25/2022 1.82 0.40 - 4.00  mU/L Final   11/23/2018 3.52 0.40 - 4.00 mU/L Final       Blood Glucose and pump data/ Meter reviewed.     All pertinent notes, labs, and images personally reviewed by me.       Glucometer/ insulin pump (if applicable)/ CGM data (if applicable) downloaded, Personally reviewed and interpreted.  All Blood sugar data reviewed personally and interpreted as well as discussed with pt.  All past medical, social and Family history reviewed and updated in [facility].    A/P  Ms.Abby TYESHA Madden is a 23 year old here for the evaluation/management of diabetes:    1. DM1 - Under Fair control.  A1c 6.3%.  No known diabetes related complications.  She is using multiple injections plus dexcom.  She is not interested in insulin pump at this time.  In general blood sugars are in acceptable range.noted high BG after breakfast  Plan:  Discussed diagnosis, pathophysiology, management and treatment options of condition with pt.  I also discussed importance of strict blood sugar control to prevent complications associated with uncontrolled diabetes.  Continue Semglee: 18 units/day  Humalog:  take I:C as noted below + sliding scale insulin 1 unit 50 > 150.  BF: 1 unit/ 5 gm of carbs ( increased dose)  Lunch: 1 unit/ 7 gm of carbs  Dinner: 1unit/7 gm of carbs  Avoid overcorrection for high Blood glucose  Continue to use DEXCOM  Labs in 6 months.  Please make a lab appointment for blood work and follow up clinic appointment in 1 week after that to discuss results.         2. Hypertension - Under Good  control. Not on medication.    3. Hyperlipidemia - Under Good control. Not on medication. LDL 86  4. Prevention  Ophthalmology-recommend annually  ASA-not indicated secondary to age  Smoking-non-smoker    Foot exam- 6/14/2023      Most Recent Immunizations   Administered Date(s) Administered     COVID-19 Bivalent 12+ (Pfizer) 06/24/2023     COVID-19 Monovalent 18+ (Moderna) 05/18/2021     Comvax (HIB/HepB) 07/11/2001     DTAP (<7y) 06/29/2004     HEPA  03/26/2013     HPV 03/26/2013     Influenza Intranasal Vaccine 09/21/2013     Influenza Vaccine >6 months (Alfuria,Fluzone) 10/17/2023     Influenza Vaccine, 6+MO IM (QUADRIVALENT W/PRESERVATIVES) 10/15/2019     MMR 06/29/2004     Meningococcal ACWY (Menactra ) 07/11/2016     Nasal Influenza Vaccine 2-49 (FluMist) 11/23/2018     Pneumo Conj 13-V (2010&after) 11/14/2019     Pneumococcal (PCV 7) 07/11/2001     Pneumococcal 23 valent 01/16/2020     Poliovirus, inactivated (IPV) 06/29/2004     TDAP (Adacel,Boostrix) 05/24/2022     TDAP Vaccine (Boostrix) 06/27/2011     Typhoid Oral 03/27/2017     Varicella 06/27/2011         Recommend checking blood sugars before meals and at bedtime.    If Blood glucose are low more often-> 2-3 times/week- give us a call.  The patient is advised to Make better food choices: reduce carbs, Reduce portion size, weight loss and exercise 3-4 times a week.  Discussed hypoglycemia signs and symptoms as well as management in detail.      There is some variability among people, most will usually develop symptoms suggestive of hypoglycemia when blood glucose levels are lowered to the mid 60's. The first set of symptoms are called adrenergic. Patients may experience any of the following nervousness, sweating, intense hunger, trembling, weakness, palpitations, and difficulty speaking. When BS fall below 50 the patient is unable to talk and take oral therapy.  Would recommend Glucagon emergency kit for the patient and education for family and friends around the patient.   The acute management of hypoglycemia involves the rapid delivery of a source of easily absorbed sugar. Regular soda, juice, lifesavers, table sugar, are good options. 15 grams of glucose is the dose that is given, followed by an assessment of symptoms and a blood glucose check if possible. If after 10 minutes there is no improvement, another 10-15 grams should be given. This can be repeated up to three times. The equivalency of  10-15 grams of glucose (approximate servings) are: Four lifesavers, 4 teaspoons of sugar, or 1/2 cup or 4 oz of juice or regular pop.    Follow-up:  4 months.    Zakia Fox M.D  Endocrinology  [facility]  CC: No Ref-Primary, Physician      All questions were answered.  The patient indicates understanding of the above issues and agrees with the plan set forth.     Disclaimer: This note consists of symbols derived from keyboarding, dictation and/or voice recognition software. As a result, there may be errors in the script that have gone undetected. Please consider this when interpreting information found in this chart.    Addendum to above note and clinic visit:    Labs reviewed.    See result note/telephone encounter.            Again, thank you for allowing me to participate in the care of your patient.        Sincerely,        Zakia Fox MD

## 2023-10-18 NOTE — PATIENT INSTRUCTIONS
[facility]  Dr Fox, Endocrinology Department    [facility]   [address] [facility]  [place] [address]  Appointment [phone]  Fax: [phone]  Thermal: Monday - Thursday      Please check the cost coverage and copay with insurance before recommended tests, services and medications (especially if new medications are prescribed).     If ordered, please get blood work done 1 week prior to your next appointment so they will be available to Dr. Fox at your visit.    To provide the best diabetic care, please bring your blood glucose meter to each and every visit with your  Endocrinologist. Your blood glucose CGM/meter/insulin pump will be downloaded at every appointment.  Please arrive 15 minutes before your scheduled appointment. This will allow for your blood glucose CGM/meter/insulin pump  to be downloaded.  If you are wearing DEXCOM please bring  or sharing code from the Dexcom Clarity Gonzalez so that it can be downloaded.  If you are using Viadeo personal sensors please bring the reader.      Continue Semglee: 18 units/day  Humalog:  take I:C as noted below + sliding scale insulin 1 unit 50 > 150.  BF: 1 unit/ 5 gm of carbs ( increased dose)  Lunch: 1 unit/ 7 gm of carbs  Dinner: 1unit/7 gm of carbs  Avoid overcorrection for high Blood glucose  Continue to use DEXCOM  Labs in 6 months.  Please make a lab appointment for blood work and follow up clinic appointment in 1 week after that to discuss results.    Look into OMNIPOd insulin pump.  Let us know if you are interested and want to see CDE.    Your provider has referred you to Diabetes Education: For all [facility]:  Phone [phone]; Fax [phone]  Please call and make the appointment.      Recommend checking blood sugars before meals and at bedtime.    If Blood glucose are low more often-> 2-3 times/week- give us a call.  Make better food choices: reduce carbs,  Reduce portion size, weight loss and exercise 3-4 times a week.    What is hypoglycemia:  Hypoglycemia is when blood sugar levels become too low - below 70 m/dl.      What causes hypoglycemia?  - using too much insulin  -taking too many diabetes pills  -not eating enough, or skipping meals or snacks  -not eating enough carbohydrate with meals  -changing your exercise routine  -drinking alcohol in excess    It is also possible to have hypoglycemia even when you are carefully managing your blood sugar levels.    What does it feel like when blood sugars get too low?  You may feel:  - anxious  -confused  -dizzy  -hungry  -light-headed  -nervous  -shaky  -sleepy  -sweaty    You may have  -blurred or cloudy vision  -heart palpitations (heart skips a beat or races)  -tingling or numbness around the mouth and tongue  -tremors    What to do if you have symptoms of hypoglycmemia:  If you think your blood sugar is too low, check it with a glucose meter.  If its below 70 mg/dl, consume one of the following:  Fruit juice (1/2 cup)  Glucose tablets (15 grams)  Hard candy (5 to 7 pieces)  Honey or sugar (2 teaspoons)  Milk (1/2 cup)  Soft drink (non-diet, 1/2 cup)    Wait 15 minutes and check your blood glucose again.  IF it is still below 70 mg/dl, have another food item listed above. Wait another 15 minutes and repeat the blood glucose test.  Have a small meal or snack that contains some carbohydrate after your blood glucose rises above 70 mg/dl.    If you are at risk of hypoglycemia, always carry with you glucose tablets or one of the foods listed above.      To prevent Hypoglycemia:  Avoid situations that may cause hypoglycemia  Before making any change to your diet or exercise routine, discuss them with your healthcare provider  Keep a record of your blood glucose levels.  Include the time of day, diabetes medications, when you had your last meal or snack, and what you were doing at the time (e.g. Watching TV, gardening,  jogging, etc).    Talk to your healthcare provider if your blood glucose levels are often low        Patient guide on hypoglycemia    [email or web address]/[email or web address]-diagnosis-[email or web address]-[email or web address]

## 2023-10-18 NOTE — PROGRESS NOTES
Endocrinology Clinic Note:  Name: Heidi Madden  Seen for f/u of type 1 Diabetes.  HPI:  Heidi Madden is a 23 year old female who presents for the evaluation/management of type 1 diabetes.  Was seen by pediatric endocrinologist  before.    Using MDI and Dexcom.  She is not interested in pump at this time but wants to think about it.    She works at [employer].  Living with mother and sister.    Wt Readings from Last 2 Encounters:   10/18/23 62.9 kg (138 lb 11.2 oz)   23 62.1 kg (136 lb 14.4 oz)        1. Type 1 DM:  Orginally diagnosed: 3/16/2018  Hospitalization for DKA: no  Current Regimen:   Semglee: 18 units/day ( 19 units during periods)  Humalo unit/ 6-8 gm of Carbs + sliding scale insulin 1 unit 50 > 150.  BF: 1 unit/6 gm of carbs  Lunch: 1 unit/7 gm of carbs  Dinner: 1unit/7 gm of carbs  Typically takes about 11-20 TID.  ( mostly estimating)    yes:     Diabetes Medication(s)       Diabetic Other       Glucagon (BAQSIMI TWO PACK) 3 MG/DOSE POWD    Spray 1 spray (3 mg) in nostril as needed (hypoglycemic siezure or unconsciousness)      Insulin       Insulin Glargine-yfgn (SEMGLEE, YFGN,) 100 UNIT/ML SOPN    Inject 18 Units Subcutaneous daily     insulin lispro (HUMALOG KWIKPEN) 100 UNIT/ML (1 unit dial) KWIKPEN    INJECT UP TO 70 UNITS PER DAY            BS checks: dexcom  Average Meter Download: Blood glucose data reviewed personally. See nursing note from this encounter for details.  Blood sugars are mostly in acceptable range.  Exercise: walks/ bike ride everyday.  Last A1c: 6.4%  Symptoms of hypoglycemia (low blood sugar): yes  Using PUMP: No    DM Complications:   Complications:   Diabetes Complications  Description / Detail    Diabetic Retinopathy  No   CAD / PAD  No   Neuropathy  No   Nephropathy / Microalbuminuria  No  No results found for: UMALCR      Gastroparesis  No   Hypoglycemia [place]  No       2. Hypertension: Blood Pressure today:   BP Readings from Last 3  Encounters:   10/18/23 125/78   06/14/23 117/77   01/20/23 130/72     3. Hyperlipidemia:  On medication    PMH/PSH:  History reviewed. No pertinent past medical history.  Past Surgical History:   Procedure Laterality Date    ZZC REIMPLANT URETER,VESICOPSOAS HITCH  01/05     Family Hx:  Family History   Problem Relation Age of Onset    Thyroid Disease Mother         hashimoyoto'd    Family History Negative Father     Lipids Father     Psychotic Disorder Father         anger mood swings    Lipids Maternal Grandmother     Hypertension Maternal Grandfather     Diabetes Paternal Grandmother     Thyroid Disease Paternal Grandmother         hypo, partial thyroidectomy    Circulatory Paternal Grandmother         coroid arteries clogged, scraped    Heart Disease Paternal Grandfather         Angioplasty    Alcohol/Drug Paternal Grandfather     Family History Negative Sister     Family History Negative Sister     Lipids Sister         as early teen    Psychotic Disorder Other         bipolar  2 first cousins    Cancer No family hx of         sno cancer. maternal uncle           DM2: aunt, uncle and pgm.           Social Hx:  Social History     Socioeconomic History    Marital status: Single     Spouse name: Not on file    Number of children: Not on file    Years of education: Not on file    Highest education level: Not on file   Occupational History    Not on file   Tobacco Use    Smoking status: Never    Smokeless tobacco: Never    Tobacco comments:     No one in family smokes.   Substance and Sexual Activity    Alcohol use: No    Drug use: No    Sexual activity: Never     Comment: constantine march 2018   Other Topics Concern    Not on file   Social History Narrative    Not on file     Social Determinants of Health     Financial Resource Strain: Not on file   Food Insecurity: Not on file   Transportation Needs: Not on file   Physical Activity: Not on file   Stress: Not on file   Social Connections: Not on file   Interpersonal  "Safety: Not on file   Housing Stability: Not on file          MEDICATIONS:  has a current medication list which includes the following prescription(s): reason aspirin not prescribed (intentional), bd lynda u/f, dexcom g6 , dexcom g6 sensor, dexcom g6 transmitter, baqsimi two pack, insulin glargine-yfgn, insulin lispro, onetouch delica lancets 33g, onetouch verio iq, statin not prescribed, and urine glucose-ketones test.    ROS     ROS: 10 point ROS neg other than the symptoms noted above in the HPI.    Physical Exam   VS: /78 (BP Location: Left arm, Patient Position: Chair, Cuff Size: Adult Regular)   Pulse 107   Temp 98.4  F (36.9  C) (Tympanic)   Resp 16   Ht 1.631 m (5' 4.21\")   Wt 62.9 kg (138 lb 11.2 oz)   LMP 10/13/2023   SpO2 98%   Breastfeeding No   BMI 23.65 kg/m    GENERAL: healthy, alert and no distress  EYES: Eyes grossly normal to inspection, conjunctivae and sclerae normal  ENT: no nose swelling, nasal discharge.  Thyroid: no apparent thyroid nodules  RESP: no audible wheeze, cough, or visible cyanosis.  No visible retractions or increased work of breathing.  Able to speak fully in complete sentences.  ABDO: not evaluated.  EXTREMITIES: no hand tremors.  NEURO: Cranial nerves grossly intact, mentation intact and speech normal  SKIN: No apparent skin lesions, rash or edema seen   PSYCH: mentation appears normal, affect normal/bright, judgement and insight intact, normal speech and appearance well-groomed  DM FOOT EXAM: normal DP and PT pulses, no trophic changes or ulcerative lesions, normal sensory exam and normal monofilament exam.     LABS:  A1c:  Lab Results   Component Value Date    A1C 6.4 10/11/2023    A1C 6.3 06/14/2023    A1C 6.5 01/04/2023    A1C 6.1 05/25/2022    A1C 5.5 12/10/2019    A1C 6.1 09/10/2019    A1C 6.1 06/18/2019    A1C 5.7 02/19/2019    A1C 6.0 11/20/2018     BMP:  Creatinine   Date Value Ref Range Status   10/11/2023 0.69 0.51 - 1.17 mg/dL Final     " Comment:     Male and Female  0-2 Months    0.31-0.88 mg/dL  2-12 Months   0.16-0.39 mg/dL  1-2 Years     0.18-0.35 mg/dL  3-4 Years     0.26-0.42 mg/dL  5-6 Years     0.29-0.47 mg/dL  7-8 Years     0.34-0.53 mg/dL  9-10 Years    0.33-0.64 mg/dL  11-12 Years   0.44-0.68 mg/dL  13-14 Years   0.46-0.77 mg/dL    Female  15 Years and older  0.51-0.95 mg/dL    Male  15 Years and older  0.67-1.17 mg/dL       03/16/2018 0.56 0.50 - 1.00 mg/dL Final     Urine Micro:  Lab Results   Component Value Date    ALCR  06/14/2023      Comment:      Unable to calculate, urine albumin and/or urine creatinine is outside detectable limits.  Microalbuminuria is defined as an albumin:creatinine ratio of 17 to 299 for males and 25 to 299 for females. A ratio of albumin:creatinine of 300 or higher is indicative of overt proteinuria.  Due to biologic variability, positive results should be confirmed by a second, first-morning random or 24-hour timed urine specimen. If there is discrepancy, a third specimen is recommended. When 2 out of 3 results are in the microalbuminuria range, this is evidence for incipient nephropathy and warrants increased efforts at glucose control, blood pressure control, and institution of therapy with an angiotensin-converting-enzyme (ACE) inhibitor (if the patient can tolerate it).      ALCR  05/25/2022      Comment:      Unable to calculate:  Urine creatinine or albumin value below detectable level         LFTs/Lipids:  Recent Labs   Lab Test 01/04/23  0818 03/15/19  0909   CHOL 168 176*   HDL 73 55   LDL 86 109   TRIG 47 58       TFTs:  TSH   Date Value Ref Range Status   10/11/2023 1.85 0.30 - 4.20 uIU/mL Final   05/25/2022 1.82 0.40 - 4.00 mU/L Final   11/23/2018 3.52 0.40 - 4.00 mU/L Final       Blood Glucose and pump data/ Meter reviewed.     All pertinent notes, labs, and images personally reviewed by me.       Glucometer/ insulin pump (if applicable)/ CGM data (if applicable) downloaded, Personally  reviewed and interpreted.  All Blood sugar data reviewed personally and interpreted as well as discussed with pt.  All past medical, social and Family history reviewed and updated in Epic.    A/P  Ms.Abby TYESHA Madden is a 23 year old here for the evaluation/management of diabetes:    1. DM1 - Under Fair control.  A1c 6.3%.  No known diabetes related complications.  She is using multiple injections plus dexcom.  She is not interested in insulin pump at this time.  In general blood sugars are in acceptable range.noted high BG after breakfast  Plan:  Discussed diagnosis, pathophysiology, management and treatment options of condition with pt.  I also discussed importance of strict blood sugar control to prevent complications associated with uncontrolled diabetes.  Continue Semglee: 18 units/day  Humalog:  take I:C as noted below + sliding scale insulin 1 unit 50 > 150.  BF: 1 unit/ 5 gm of carbs ( increased dose)  Lunch: 1 unit/ 7 gm of carbs  Dinner: 1unit/7 gm of carbs  Avoid overcorrection for high Blood glucose  Continue to use DEXCOM  Labs in 6 months.  Please make a lab appointment for blood work and follow up clinic appointment in 1 week after that to discuss results.         2. Hypertension - Under Good  control. Not on medication.    3. Hyperlipidemia - Under Good control. Not on medication. LDL 86  4. Prevention  Ophthalmology-recommend annually  ASA-not indicated secondary to age  Smoking-non-smoker    Foot exam- 6/14/2023      Most Recent Immunizations   Administered Date(s) Administered    COVID-19 Bivalent 12+ (Pfizer) 06/24/2023    COVID-19 Monovalent 18+ (Moderna) 05/18/2021    Comvax (HIB/HepB) 07/11/2001    DTAP (<7y) 06/29/2004    HEPA 03/26/2013    HPV 03/26/2013    Influenza Intranasal Vaccine 09/21/2013    Influenza Vaccine >6 months (Alfuria,Fluzone) 10/17/2023    Influenza Vaccine, 6+MO IM (QUADRIVALENT W/PRESERVATIVES) 10/15/2019    MMR 06/29/2004    Meningococcal ACWY (Menactra ) 07/11/2016     Nasal Influenza Vaccine 2-49 (FluMist) 11/23/2018    Pneumo Conj 13-V (2010&after) 11/14/2019    Pneumococcal (PCV 7) 07/11/2001    Pneumococcal 23 valent 01/16/2020    Poliovirus, inactivated (IPV) 06/29/2004    TDAP (Adacel,Boostrix) 05/24/2022    TDAP Vaccine (Boostrix) 06/27/2011    Typhoid Oral 03/27/2017    Varicella 06/27/2011         Recommend checking blood sugars before meals and at bedtime.    If Blood glucose are low more often-> 2-3 times/week- give us a call.  The patient is advised to Make better food choices: reduce carbs, Reduce portion size, weight loss and exercise 3-4 times a week.  Discussed hypoglycemia signs and symptoms as well as management in detail.      There is some variability among people, most will usually develop symptoms suggestive of hypoglycemia when blood glucose levels are lowered to the mid 60's. The first set of symptoms are called adrenergic. Patients may experience any of the following nervousness, sweating, intense hunger, trembling, weakness, palpitations, and difficulty speaking. When BS fall below 50 the patient is unable to talk and take oral therapy.  Would recommend Glucagon emergency kit for the patient and education for family and friends around the patient.   The acute management of hypoglycemia involves the rapid delivery of a source of easily absorbed sugar. Regular soda, juice, lifesavers, table sugar, are good options. 15 grams of glucose is the dose that is given, followed by an assessment of symptoms and a blood glucose check if possible. If after 10 minutes there is no improvement, another 10-15 grams should be given. This can be repeated up to three times. The equivalency of 10-15 grams of glucose (approximate servings) are: Four lifesavers, 4 teaspoons of sugar, or 1/2 cup or 4 oz of juice or regular pop.    Follow-up:  4 months.    Zakia Fox M.D  [facility]  CC: No Ref-Primary, Physician      All questions were  answered.  The patient indicates understanding of the above issues and agrees with the plan set forth.     Disclaimer: This note consists of symbols derived from keyboarding, dictation and/or voice recognition software. As a result, there may be errors in the script that have gone undetected. Please consider this when interpreting information found in this chart.    Addendum to above note and clinic visit:    Labs reviewed.    See result note/telephone encounter.

## 2023-10-18 NOTE — NURSING NOTE
"ENDOCRINOLOGY INTAKE FORM    Patient Name:  Heidi Madden  :  2000    Is patient Diabetic?   Yes: type 1  Does patient have non-diabetic or other endocrine issues?  Yes:   Abnormal finding on thyroid function test       Vitals: There were no vitals taken for this visit.  BMI= There is no height or weight on file to calculate BMI.    Flu vaccine:  No  Pneumonia vaccine:  Yes: 13 x 1, 23 x 1    Smoking and Alcohol use:  Social History     Tobacco Use    Smoking status: Never    Smokeless tobacco: Never    Tobacco comments:     No one in family smokes.   Substance Use Topics    Alcohol use: No    Drug use: No       Foot Exam: No  Eye Exam:  Yes: 23  Dental Exam:    Aspirin Use:  No    Lab Results   Component Value Date    A1C 6.4 10/11/2023    A1C 6.3 2023    A1C 6.5 2023    A1C 6.1 2022    A1C 5.5 12/10/2019    A1C 6.1 09/10/2019    A1C 6.1 2019    A1C 5.7 2019    A1C 6.0 2018       Lab Results   Component Value Date    MICROL <12.0 2023    MICROL <5 2022     No results found for: \"MICROALBUMIN\"    Nydia Kline[facility]. [facility] [place]  [phone]    "

## 2023-10-26 ENCOUNTER — OFFICE VISIT (OUTPATIENT)
Dept: FAMILY MEDICINE | Facility: CLINIC | Age: 23
End: 2023-10-26
Payer: COMMERCIAL

## 2023-10-26 VITALS
RESPIRATION RATE: 16 BRPM | DIASTOLIC BLOOD PRESSURE: 64 MMHG | HEART RATE: 93 BPM | HEIGHT: 65 IN | BODY MASS INDEX: 22.49 KG/M2 | WEIGHT: 135 LBS | TEMPERATURE: 97.2 F | SYSTOLIC BLOOD PRESSURE: 110 MMHG | OXYGEN SATURATION: 100 %

## 2023-10-26 DIAGNOSIS — E10.649 TYPE 1 DIABETES MELLITUS WITH HYPOGLYCEMIA AND WITHOUT COMA (H): ICD-10-CM

## 2023-10-26 DIAGNOSIS — R59.9 SWELLING OF LYMPH NODE: ICD-10-CM

## 2023-10-26 DIAGNOSIS — L04.9 LYMPHADENITIS, ACUTE: Primary | ICD-10-CM

## 2023-10-26 PROCEDURE — 99213 OFFICE O/P EST LOW 20 MIN: CPT | Performed by: NURSE PRACTITIONER

## 2023-10-26 RX ORDER — CEPHALEXIN 500 MG/1
500 CAPSULE ORAL 2 TIMES DAILY
Qty: 20 CAPSULE | Refills: 0 | Status: SHIPPED | OUTPATIENT
Start: 2023-10-26 | End: 2023-10-27

## 2023-10-26 NOTE — PROGRESS NOTES
Assessment & Plan     Lymphadenitis, acute  Cephalexin as ordered. If not improving follow up in clinic.    Swelling of lymph node    Type 1 diabetes mellitus with hypoglycemia (H)  Follows endocrine.        [place] CNP  [facility]    Cecy Fowler is a 23 year old, presenting for the following health issues:  painful lump / neck        10/26/2023    11:50 AM   Additional Questions   Roomed by Barb CHAVES       History of Present Illness       Reason for visit:  Painful bump in neck since Saturday, no improvement  Symptom onset:  3-7 days ago  Symptoms include:  Hard, painful bump,surrounding nerve pain and creating stress on rest of body  Symptom intensity:  Moderate  Symptom progression:  Staying the same  Had these symptoms before:  No  What makes it worse:  Turning my neck, touching the area  What makes it better:  Heat and massage help sooth it but don't make it better    Malcolm Madden eats 2-3 servings of fruits and vegetables daily.Malcolm Madden consumes 2 sweetened beverage(s) daily.Malcolm Madden exercises with enough effort to increase Malcolm Madden's heart rate 30 to 60 minutes per day.  Malcolm Madden exercises with enough effort to increase Malcolm Madden's heart rate 3 or less days per week.   Malcolm Madden is taking medications regularly.               Review of Systems   Constitutional, HEENT, cardiovascular, pulmonary, GI, , musculoskeletal, neuro, skin, endocrine and psych systems are negative, except as otherwise noted.      Objective    LMP 10/13/2023   There is no height or weight on file to calculate BMI.  Physical Exam  Neck:      Thyroid: No thyromegaly.        Comments: Tender, swollen warm lymph node in right cervical chain.                     LISA Bellamy     [address] [address]  [email or web address]   Office: [phone]

## 2023-10-27 ENCOUNTER — TELEPHONE (OUTPATIENT)
Dept: FAMILY MEDICINE | Facility: CLINIC | Age: 23
End: 2023-10-27
Payer: COMMERCIAL

## 2023-10-27 DIAGNOSIS — R59.9 SWELLING OF LYMPH NODE: ICD-10-CM

## 2023-10-27 RX ORDER — CEPHALEXIN 500 MG/1
500 CAPSULE ORAL 2 TIMES DAILY
Qty: 20 CAPSULE | Refills: 0 | Status: SHIPPED | OUTPATIENT
Start: 2023-10-27 | End: 2024-04-17

## 2023-10-27 NOTE — TELEPHONE ENCOUNTER
Patient calling requesting pharmacy change for antibiotic - Keflex.     Sent to preferred pharmacy.

## 2023-11-06 ENCOUNTER — MYC REFILL (OUTPATIENT)
Dept: ENDOCRINOLOGY | Facility: CLINIC | Age: 23
End: 2023-11-06
Payer: COMMERCIAL

## 2023-11-06 DIAGNOSIS — E10.65 TYPE 1 DIABETES MELLITUS WITH HYPERGLYCEMIA (H): ICD-10-CM

## 2023-11-06 RX ORDER — PROCHLORPERAZINE 25 MG/1
1 SUPPOSITORY RECTAL
Qty: 9 EACH | Refills: 0 | Status: SHIPPED | OUTPATIENT
Start: 2023-11-06 | End: 2024-02-06

## 2023-11-06 RX ORDER — PROCHLORPERAZINE 25 MG/1
1 SUPPOSITORY RECTAL
Qty: 1 EACH | Refills: 0 | Status: SHIPPED | OUTPATIENT
Start: 2023-11-06 | End: 2024-04-17

## 2023-11-06 NOTE — TELEPHONE ENCOUNTER
"        Requested Prescriptions   Pending Prescriptions Disp Refills    Continuous Blood Gluc Sensor (DEXCOM G6 SENSOR) MISC 9 each 1     Si each every 10 days       There is no refill protocol information for this order       Continuous Blood Gluc Transmit (DEXCOM G6 TRANSMITTER) MISC 1 each 0     Si each every 3 months       Diabetic Supplies Protocol Passed - 2023  5:59 AM        Passed - Medication is active on med list        Passed - Patient is 18 years of age or older        Passed - Recent (6 mo) or future (30 days) visit within the authorizing provider's specialty     Patient had office visit in the last 6 months or has a visit in the next 30 days with authorizing provider.  See \"Patient Info\" tab in inbasket, or \"Choose Columns\" in Meds & Orders section of the refill encounter.                 "

## 2023-11-19 ENCOUNTER — HEALTH MAINTENANCE LETTER (OUTPATIENT)
Age: 23
End: 2023-11-19

## 2023-12-24 DIAGNOSIS — E10.65 TYPE 1 DIABETES MELLITUS WITH HYPERGLYCEMIA (H): ICD-10-CM

## 2023-12-26 RX ORDER — INSULIN LISPRO 100 [IU]/ML
INJECTION, SOLUTION INTRAVENOUS; SUBCUTANEOUS
Qty: 60 ML | Refills: 0 | Status: SHIPPED | OUTPATIENT
Start: 2023-12-26 | End: 2024-07-16

## 2023-12-26 NOTE — TELEPHONE ENCOUNTER
"    Requested Prescriptions   Pending Prescriptions Disp Refills    insulin lispro (HUMALOG KWIKPEN) 100 UNIT/ML (1 unit dial) KWIKPEN [Pharmacy Med Name: HUMALOG KWIKPEN U100 3ML 5'S 100U/ML] 60 mL 3     Sig: INJECT UP TO 70 UNITS DAILY       Short Acting Insulin Protocol Passed - 12/24/2023 11:35 PM        Passed - Serum creatinine on file in past 12 months     Recent Labs   Lab Test 10/11/23  1258   CR 0.69       Ok to refill medication if creatinine is low          Passed - HgbA1C in past 3 or 6 months     If HgbA1C is 8 or greater, it needs to be on file within the past 3 months.  If less than 8, must be on file within the past 6 months.     Recent Labs   Lab Test 10/11/23  1258   A1C 6.4*             Passed - Medication is active on med list        Passed - Patient is age 18 or older        Passed - Recent (6 mo) or future (30 days) visit within the authorizing provider's specialty     Patient had office visit in the last 6 months or has a visit in the next 30 days with authorizing provider or within the authorizing provider's specialty.  See \"Patient Info\" tab in inbasket, or \"Choose Columns\" in Meds & Orders section of the refill encounter.                 "

## 2024-01-07 DIAGNOSIS — E10.65 TYPE 1 DIABETES MELLITUS WITH HYPERGLYCEMIA (H): ICD-10-CM

## 2024-01-08 RX ORDER — PEN NEEDLE, DIABETIC 32GX 5/32"
NEEDLE, DISPOSABLE MISCELLANEOUS
Qty: 800 EACH | Refills: 1 | Status: SHIPPED | OUTPATIENT
Start: 2024-01-08 | End: 2024-07-05

## 2024-01-08 NOTE — TELEPHONE ENCOUNTER
"Requested Prescriptions   Pending Prescriptions Disp Refills    BD PEN NEEDLE KRISTA 2ND GEN 32G X 4 MM miscellaneous [Pharmacy Med Name: BD PEN MERE KRISTA 2GEN 4MM 90'S 32G5/32]  3     Sig: USE WITH INSULIN PEN EIGHT TIMES DAILY       Diabetic Supplies Protocol Passed - 1/7/2024 11:40 PM        Passed - Medication is active on med list        Passed - Patient is 18 years of age or older        Passed - Recent (6 mo) or future (30 days) visit within the authorizing provider's specialty     Patient had office visit in the last 6 months or has a visit in the next 30 days with authorizing provider.  See \"Patient Info\" tab in inbasket, or \"Choose Columns\" in Meds & Orders section of the refill encounter.                 "

## 2024-02-06 ENCOUNTER — MYC REFILL (OUTPATIENT)
Dept: ENDOCRINOLOGY | Facility: CLINIC | Age: 24
End: 2024-02-06
Payer: COMMERCIAL

## 2024-02-06 DIAGNOSIS — E10.65 TYPE 1 DIABETES MELLITUS WITH HYPERGLYCEMIA (H): ICD-10-CM

## 2024-02-07 RX ORDER — PROCHLORPERAZINE 25 MG/1
1 SUPPOSITORY RECTAL
Qty: 9 EACH | Refills: 0 | Status: SHIPPED | OUTPATIENT
Start: 2024-02-07 | End: 2024-04-17

## 2024-02-07 NOTE — TELEPHONE ENCOUNTER
Requested Prescriptions   Pending Prescriptions Disp Refills    Continuous Blood Gluc Sensor (DEXCOM G6 SENSOR) MISC 9 each 0     Si each every 10 days       There is no refill protocol information for this order

## 2024-03-17 ENCOUNTER — MYC REFILL (OUTPATIENT)
Dept: ENDOCRINOLOGY | Facility: CLINIC | Age: 24
End: 2024-03-17
Payer: COMMERCIAL

## 2024-03-17 DIAGNOSIS — E10.649 TYPE 1 DIABETES MELLITUS WITH HYPOGLYCEMIA AND WITHOUT COMA (H): ICD-10-CM

## 2024-03-18 RX ORDER — INSULIN GLARGINE-YFGN 100 [IU]/ML
18 INJECTION, SOLUTION SUBCUTANEOUS DAILY
Qty: 30 ML | Refills: 0 | Status: SHIPPED | OUTPATIENT
Start: 2024-03-18 | End: 2024-07-16

## 2024-03-18 NOTE — TELEPHONE ENCOUNTER
Requested Prescriptions   Pending Prescriptions Disp Refills    Insulin Glargine-yfgn (SEMGLEE (YFGN)) 100 UNIT/ML SOPN 30 mL 1     Sig: Inject 18 Units Subcutaneous daily       There is no refill protocol information for this order

## 2024-03-27 ENCOUNTER — ALLIED HEALTH/NURSE VISIT (OUTPATIENT)
Dept: ENDOCRINOLOGY | Facility: CLINIC | Age: 24
End: 2024-03-27
Payer: COMMERCIAL

## 2024-03-27 VITALS — SYSTOLIC BLOOD PRESSURE: 121 MMHG | DIASTOLIC BLOOD PRESSURE: 69 MMHG | HEART RATE: 61 BPM

## 2024-03-27 DIAGNOSIS — Z01.30 BP CHECK: Primary | ICD-10-CM

## 2024-03-27 PROCEDURE — 99207 PR NO CHARGE NURSE ONLY: CPT | Performed by: INTERNAL MEDICINE

## 2024-03-27 NOTE — PROGRESS NOTES
Heidi Madden was evaluated at [facility] on March 27, 2024 at which time Malcolm Madden's blood pressure was:    BP Readings from Last 1 Encounters:   03/27/24 121/69     No data recorded      Reviewed lifestyle modifications for blood pressure control and reduction: including making healthy food choices, managing weight, getting regular exercise, smoking cessation, reducing alcohol consumption, monitoring blood pressure regularly.     Symptoms: None    BP Goal:< 140/90 mmHg    BP Assessment:  BP at goal    Potential Reasons for BP too high: NA - Not applicable    BP Follow-Up Plan: Recheck BP in 6 months at pharmacy    Recommendation to Provider: no change at this time.    La Gill, PharmD  [facility]  PH: [phone]      Note completed by: La Gill [facility]

## 2024-04-10 ENCOUNTER — LAB (OUTPATIENT)
Dept: LAB | Facility: CLINIC | Age: 24
End: 2024-04-10
Payer: COMMERCIAL

## 2024-04-10 DIAGNOSIS — E10.65 TYPE 1 DIABETES MELLITUS WITH HYPERGLYCEMIA (H): ICD-10-CM

## 2024-04-10 LAB — HBA1C MFR BLD: 6.3 % (ref 0–5.6)

## 2024-04-10 PROCEDURE — 36415 COLL VENOUS BLD VENIPUNCTURE: CPT

## 2024-04-10 PROCEDURE — 83036 HEMOGLOBIN GLYCOSYLATED A1C: CPT

## 2024-04-17 ENCOUNTER — OFFICE VISIT (OUTPATIENT)
Dept: ENDOCRINOLOGY | Facility: CLINIC | Age: 24
End: 2024-04-17
Payer: COMMERCIAL

## 2024-04-17 VITALS
WEIGHT: 138.7 LBS | SYSTOLIC BLOOD PRESSURE: 124 MMHG | OXYGEN SATURATION: 96 % | TEMPERATURE: 99.1 F | RESPIRATION RATE: 16 BRPM | HEIGHT: 65 IN | HEART RATE: 92 BPM | DIASTOLIC BLOOD PRESSURE: 82 MMHG | BODY MASS INDEX: 23.11 KG/M2

## 2024-04-17 DIAGNOSIS — E10.649 TYPE 1 DIABETES MELLITUS WITH HYPOGLYCEMIA AND WITHOUT COMA (H): Primary | ICD-10-CM

## 2024-04-17 DIAGNOSIS — E10.65 TYPE 1 DIABETES MELLITUS WITH HYPERGLYCEMIA (H): ICD-10-CM

## 2024-04-17 PROCEDURE — G2211 COMPLEX E/M VISIT ADD ON: HCPCS | Performed by: INTERNAL MEDICINE

## 2024-04-17 PROCEDURE — 95251 CONT GLUC MNTR ANALYSIS I&R: CPT | Performed by: INTERNAL MEDICINE

## 2024-04-17 PROCEDURE — 99214 OFFICE O/P EST MOD 30 MIN: CPT | Performed by: INTERNAL MEDICINE

## 2024-04-17 RX ORDER — PROCHLORPERAZINE 25 MG/1
1 SUPPOSITORY RECTAL
Qty: 1 EACH | Refills: 2 | Status: SHIPPED | OUTPATIENT
Start: 2024-04-17

## 2024-04-17 RX ORDER — PROCHLORPERAZINE 25 MG/1
1 SUPPOSITORY RECTAL
Qty: 9 EACH | Refills: 3 | Status: SHIPPED | OUTPATIENT
Start: 2024-04-17

## 2024-04-17 NOTE — NURSING NOTE
"ENDOCRINOLOGY INTAKE FORM    Patient Name:  Heidi Madden  :  2000    Is patient Diabetic?   Yes: type 1  Does patient have non-diabetic or other endocrine issues?  Yes:   Abnormal finding on thyroid function test     Vitals: There were no vitals taken for this visit.  BMI= There is no height or weight on file to calculate BMI.    Flu vaccine:  Yes:   Pneumonia vaccine:  Yes: 13 x 1, 23 x 1    Smoking and Alcohol use:  Social History     Tobacco Use    Smoking status: Never    Smokeless tobacco: Never    Tobacco comments:     No one in family smokes.   Substance Use Topics    Alcohol use: No    Drug use: No       Foot Exam: Yes: 10/18/23  Eye Exam:  Yes: 23  Dental Exam:    Aspirin Use:  No    Lab Results   Component Value Date    A1C 6.3 04/10/2024    A1C 6.4 10/11/2023    A1C 6.3 2023    A1C 6.5 2023    A1C 6.1 2022    A1C 5.5 12/10/2019    A1C 6.1 09/10/2019    A1C 6.1 2019    A1C 5.7 2019    A1C 6.0 2018       Lab Results   Component Value Date    MICROL <12.0 2023    MICROL <5 2022     No results found for: \"MICROALBUMIN\"    Nydia Kline [facility] Endocrinology [place]  [phone]    "

## 2024-04-17 NOTE — PATIENT INSTRUCTIONS
[facility]  Dr Fox, Endocrinology Department    [facility]   [address] [facility]  [place] [address]  Appointment [phone]  Fax: [phone]  [place]: Monday - Thursday      Please check the cost coverage and copay with insurance before recommended tests, services and medications (especially if new medications are prescribed).     If ordered, please get blood work done 1 week prior to your next appointment so they will be available to Dr. Fox at your visit.    To provide the best diabetic care, please bring your blood glucose meter to each and every visit with your  Endocrinologist. Your blood glucose CGM/meter/insulin pump will be downloaded at every appointment.  Please arrive 15 minutes before your scheduled appointment. This will allow for your blood glucose CGM/meter/insulin pump  to be downloaded.  If you are wearing DEXCOM please bring  or sharing code from the Dexcom Clarity Gonzalez so that it can be downloaded.  If you are using [employer] personal sensors please bring the reader.      Decrease Semglee: to 16-18 units/day ( decrease by 1-2 units/day)  Humalog: continue current sliding scale insulin 1 unit 50 > 150.  BF: take 1 unit/4 gm of carbs  Lunch: take 1 unit/6 gm of carbs  Dinner: continue 1unit/7 gm of carbs    Continue to use Dexcom G6.  Inform us if you need Rx for Dexcom G7.    Labs in 6-7 months  Please make a lab appointment for blood work and follow up clinic appointment in 1 week after that to discuss results.    Recommend checking blood sugars before meals and at bedtime.    If Blood glucose are low more often-> 2-3 times/week- give us a call.  Make better food choices: reduce carbs, Reduce portion size, weight loss and exercise 3-4 times a week.    What is hypoglycemia:  Hypoglycemia is when blood sugar levels become too low - below 70 m/dl.      What causes hypoglycemia?  - using too much insulin  -taking  too many diabetes pills  -not eating enough, or skipping meals or snacks  -not eating enough carbohydrate with meals  -changing your exercise routine  -drinking alcohol in excess    It is also possible to have hypoglycemia even when you are carefully managing your blood sugar levels.    What does it feel like when blood sugars get too low?  You may feel:  - anxious  -confused  -dizzy  -hungry  -light-headed  -nervous  -shaky  -sleepy  -sweaty    You may have  -blurred or cloudy vision  -heart palpitations (heart skips a beat or races)  -tingling or numbness around the mouth and tongue  -tremors    What to do if you have symptoms of hypoglycmemia:  If you think your blood sugar is too low, check it with a glucose meter.  If its below 70 mg/dl, consume one of the following:  Fruit juice (1/2 cup)  Glucose tablets (15 grams)  Hard candy (5 to 7 pieces)  Honey or sugar (2 teaspoons)  Milk (1/2 cup)  Soft drink (non-diet, 1/2 cup)    Wait 15 minutes and check your blood glucose again.  IF it is still below 70 mg/dl, have another food item listed above. Wait another 15 minutes and repeat the blood glucose test.  Have a small meal or snack that contains some carbohydrate after your blood glucose rises above 70 mg/dl.    If you are at risk of hypoglycemia, always carry with you glucose tablets or one of the foods listed above.      To prevent Hypoglycemia:  Avoid situations that may cause hypoglycemia  Before making any change to your diet or exercise routine, discuss them with your healthcare provider  Keep a record of your blood glucose levels.  Include the time of day, diabetes medications, when you had your last meal or snack, and what you were doing at the time (e.g. Watching TV, gardening, jogging, etc).    Talk to your healthcare provider if your blood glucose levels are often low        Patient guide on  hypoglycemia    [email or web address]

## 2024-04-17 NOTE — PROGRESS NOTES
Endocrinology Clinic Note:  Name: Heidi Madden  Seen for f/u of type 1 Diabetes.  HPI:  Heidi Madden is a 23 year old female who presents for the evaluation/management of type 1 diabetes.  Was seen by pediatric endocrinologist  before.    Using MDI and Dexcom.  She is not interested in pump at this time but wants to think about it.    She works at [employer].  She works in shifts.  Living with mother and sister.    Wt Readings from Last 2 Encounters:   24 62.9 kg (138 lb 11.2 oz)   10/26/23 61.2 kg (135 lb)        1. Type 1 DM:  Orginally diagnosed: 3/16/2018  Hospitalization for DKA: no  Current Regimen:   Semglee: 18 units/day ( 19 units during periods)  Humalo unit/ 6-8 gm of Carbs + sliding scale insulin 1 unit 50 > 150.  BF: 1 unit/5 gm of carbs  Lunch: 1 unit/7 gm of carbs  Dinner: 1unit/7 gm of carbs  Typically takes about 11-20 TID.  ( mostly estimating)    yes:     Diabetes Medication(s)       Diabetic Other       Glucagon (BAQSIMI TWO PACK) 3 MG/DOSE POWD Spray 1 spray (3 mg) in nostril as needed (hypoglycemic siezure or unconsciousness)       Insulin       Insulin Glargine-yfgn (SEMGLEE, YFGN,) 100 UNIT/ML SOPN Inject 18 Units Subcutaneous daily     insulin lispro (HUMALOG KWIKPEN) 100 UNIT/ML (1 unit dial) KWIKPEN INJECT UP TO 70 UNITS DAILY            BS checks: dexcom  Average Meter Download: Blood glucose data reviewed personally. See nursing note from this encounter for details.  Blood sugars are mostly in acceptable range.  Exercise: walks/ bike ride everyday.  Last A1c: 6.3%  Symptoms of hypoglycemia (low blood sugar): yes  Using PUMP: No    DM Complications:   Complications:   Diabetes Complications  Description / Detail    Diabetic Retinopathy  No   CAD / PAD  No   Neuropathy  No   Nephropathy / Microalbuminuria  No  No results found for: UMALCR      Gastroparesis  No   Hypoglycemia [place]  No       2. Hypertension: Blood Pressure today:   BP Readings from Last 3  Encounters:   04/17/24 124/82   03/27/24 121/69   10/26/23 110/64     3. Hyperlipidemia:  On medication    PMH/PSH:  History reviewed. No pertinent past medical history.  Past Surgical History:   Procedure Laterality Date    ZZC REIMPLANT URETER,VESICOPSOAS HITCH  01/05     Family Hx:  Family History   Problem Relation Age of Onset    Thyroid Disease Mother         hashimoyoto'd    Family History Negative Father     Lipids Father     Psychotic Disorder Father         anger mood swings    Lipids Maternal Grandmother     Hypertension Maternal Grandfather     Diabetes Paternal Grandmother     Thyroid Disease Paternal Grandmother         hypo, partial thyroidectomy    Circulatory Paternal Grandmother         coroid arteries clogged, scraped    Heart Disease Paternal Grandfather         Angioplasty    Alcohol/Drug Paternal Grandfather     Family History Negative Sister     Family History Negative Sister     Lipids Sister         as early teen    Psychotic Disorder Other         bipolar  2 first cousins    Cancer No family hx of         sno cancer. maternal uncle           DM2: aunt, uncle and pgm.           Social Hx:  Social History     Socioeconomic History    Marital status: Single     Spouse name: Not on file    Number of children: Not on file    Years of education: Not on file    Highest education level: Not on file   Occupational History    Not on file   Tobacco Use    Smoking status: Never    Smokeless tobacco: Never    Tobacco comments:     No one in family smokes.   Substance and Sexual Activity    Alcohol use: No    Drug use: No    Sexual activity: Never     Comment: constantine march 2018   Other Topics Concern    Not on file   Social History Narrative    Not on file     Social Determinants of Health     Financial Resource Strain: Low Risk  (2/15/2024)    [email or web address]    Financial Resource Strain     Difficulty of Paying  "Living Expenses: 3     Difficulty of Paying Living Expenses: Not on file   Food Insecurity: No Food Insecurity (2/15/2024)    Received from [facility], [facility]    Food Insecurity     Worried About Running Out of Food in the Last Year: 1   Transportation Needs: No Transportation Needs (2/15/2024)    [place] from [facility]note 2], [facility]    Transportation Needs     Lack of Transportation (Medical): 1   Physical Activity: Not on file   Stress: Not on file   Social Connections: Socially Integrated (2/15/2024)    [place] from [facility] 2], [facility] 2[facility]    Social Connections     Frequency of Communication with Friends and Family: 0   Interpersonal Safety: Not on file   Housing Stability: Low Risk  (2/15/2024)    Received from [facility] [facility], [facility][note 2]    Housing Stability     Unable to Pay for Housing in the Last Year: 1          MEDICATIONS:  has a current medication list which includes the following prescription(s): reason aspirin not prescribed (intentional), bd pen needle lynda 2nd gen, dexcom g6 , dexcom g6 sensor, dexcom g6 transmitter, baqsimi two pack, insulin glargine-yfgn, insulin lispro, onetouch delica lancets 33g, onetouch verio iq, statin not prescribed, and urine glucose-ketones test.    ROS     ROS: 10 point ROS neg other than the symptoms noted above in the HPI.    Physical Exam   VS: /82 (BP Location: Left arm, Patient Position: Chair, Cuff Size: Adult Regular)   Pulse 92   Temp 99.1  F (37.3  C) (Tympanic)   Resp 16   Ht 1.651 m (5' 5\")   Wt 62.9 kg (138 lb 11.2 oz)   LMP 03/24/2024   SpO2 96%   Breastfeeding No   BMI 23.08 kg/m    GENERAL: healthy, alert and no distress  EYES: Eyes grossly normal to inspection, " conjunctivae and sclerae normal  ENT: no nose swelling, nasal discharge.  Thyroid: no apparent thyroid nodules  RESP: no audible wheeze, cough, or visible cyanosis.  No visible retractions or increased work of breathing.  Able to speak fully in complete sentences.  ABDO: not evaluated.  EXTREMITIES: no hand tremors.  NEURO: Cranial nerves grossly intact, mentation intact and speech normal  SKIN: No apparent skin lesions, rash or edema seen   PSYCH: mentation appears normal, affect normal/bright, judgement and insight intact, normal speech and appearance well-groomed  DM FOOT EXAM: normal DP and PT pulses, no trophic changes or ulcerative lesions, normal sensory exam and normal monofilament exam.     LABS:  A1c:  Lab Results   Component Value Date    A1C 6.3 04/10/2024    A1C 6.4 10/11/2023    A1C 6.3 06/14/2023    A1C 6.5 01/04/2023    A1C 6.1 05/25/2022    A1C 5.5 12/10/2019    A1C 6.1 09/10/2019    A1C 6.1 06/18/2019    A1C 5.7 02/19/2019    A1C 6.0 11/20/2018     BMP:  Creatinine   Date Value Ref Range Status   10/11/2023 0.69 0.51 - 1.17 mg/dL Final     Comment:     Male and Female  0-2 Months    0.31-0.88 mg/dL  2-12 Months   0.16-0.39 mg/dL  1-2 Years     0.18-0.35 mg/dL  3-4 Years     0.26-0.42 mg/dL  5-6 Years     0.29-0.47 mg/dL  7-8 Years     0.34-0.53 mg/dL  9-10 Years    0.33-0.64 mg/dL  11-12 Years   0.44-0.68 mg/dL  13-14 Years   0.46-0.77 mg/dL    Female  15 Years and older  0.51-0.95 mg/dL    Male  15 Years and older  0.67-1.17 mg/dL       03/16/2018 0.56 0.50 - 1.00 mg/dL Final     Urine Micro:  Lab Results   Component Value Date    UMALCR  06/14/2023      Comment:      Unable to calculate, urine albumin and/or urine creatinine is outside detectable limits.  Microalbuminuria is defined as an albumin:creatinine ratio of 17 to 299 for males and 25 to 299 for females. A ratio of albumin:creatinine of 300 or higher is indicative of overt proteinuria.  Due to biologic variability, positive results should  be confirmed by a second, first-morning random or 24-hour timed urine specimen. If there is discrepancy, a third specimen is recommended. When 2 out of 3 results are in the microalbuminuria range, this is evidence for incipient nephropathy and warrants increased efforts at glucose control, blood pressure control, and institution of therapy with an angiotensin-converting-enzyme (ACE) inhibitor (if the patient can tolerate it).      UMALCR  05/25/2022      Comment:      Unable to calculate:  Urine creatinine or albumin value below detectable level         LFTs/Lipids:  Recent Labs   Lab Test 01/04/23  0818 03/15/19  0909   CHOL 168 176*   HDL 73 55   LDL 86 109   TRIG 47 58     TFTs:  TSH   Date Value Ref Range Status   10/11/2023 1.85 0.30 - 4.20 uIU/mL Final   05/25/2022 1.82 0.40 - 4.00 mU/L Final   11/23/2018 3.52 0.40 - 4.00 mU/L Final       Blood Glucose and pump data/ Meter reviewed.     All pertinent notes, labs, and images personally reviewed by me.       Glucometer/ insulin pump (if applicable)/ CGM data (if applicable) downloaded, Personally reviewed and interpreted.  All Blood sugar data reviewed personally and interpreted as well as discussed with pt.  All past medical, social and Family history reviewed and updated in Epic.    A/P  Ms.Abby TYESHA Madden is a 23 year old here for the evaluation/management of diabetes:    1. DM1 - Under Fair control.  A1c 6.3%.  No known diabetes related complications.  She is using multiple injections plus dexcom.  She is not interested in insulin pump at this time.  In general blood sugars are in acceptable range.noted high BG after breakfast and lunch  Plan:  Discussed diagnosis, pathophysiology, management and treatment options of condition with pt.  I also discussed importance of strict blood sugar control to prevent complications associated with uncontrolled diabetes.  Decrease Semglee: to 16-18 units/day ( decrease by 1-2 units/day)  Humalog: continue current sliding  scale insulin 1 unit 50 > 150.  BF: take 1 unit/4 gm of carbs  Lunch: take 1 unit/6 gm of carbs  Dinner: continue 1unit/7 gm of carbs  Continue to use Dexcom G6.  Inform us if you need Rx for Dexcom G7.  Labs in 6-7 months  Please make a lab appointment for blood work and follow up clinic appointment in 1 week after that to discuss results.    2. Hypertension - Under Good  control. Not on medication.    3. Hyperlipidemia - Under Good control. Not on medication. LDL 86  4. Prevention  Ophthalmology-recommend annually  ASA-not indicated secondary to age  Smoking-non-smoker    Foot exam- 6/14/2023      Most Recent Immunizations   Administered Date(s) Administered    COVID-19 Bivalent 12+ (Pfizer) 06/24/2023    COVID-19 Monovalent 18+ (Moderna) 05/18/2021    Comvax (HIB/HepB) 07/11/2001    DTAP (<7y) 06/29/2004    HEPA 03/26/2013    HPV 03/26/2013    Influenza Intranasal Vaccine 09/21/2013    Influenza Vaccine >6 months,quad, PF 10/17/2023    Influenza Vaccine, 6+MO IM (QUADRIVALENT W/PRESERVATIVES) 10/15/2019    Influenza,INJ,MDCK,PF,Quad >6mo(Flucelvax) 10/17/2023    MMR 06/29/2004    Meningococcal ACWY (Menactra ) 07/11/2016    Nasal Influenza Vaccine 2-49 (FluMist) 11/23/2018    Pneumo Conj 13-V (2010&after) 11/14/2019    Pneumococcal (PCV 7) 07/11/2001    Pneumococcal 23 valent 01/16/2020    Poliovirus, inactivated (IPV) 06/29/2004    TDAP (Adacel,Boostrix) 05/24/2022    TDAP Vaccine (Boostrix) 06/27/2011    Typhoid Oral 03/27/2017    Varicella 06/27/2011         Recommend checking blood sugars before meals and at bedtime.    If Blood glucose are low more often-> 2-3 times/week- give us a call.  The patient is advised to Make better food choices: reduce carbs, Reduce portion size, weight loss and exercise 3-4 times a week.  Discussed hypoglycemia signs and symptoms as well as management in detail.      There is some variability among people, most will usually develop symptoms suggestive of hypoglycemia when blood  glucose levels are lowered to the mid 60's. The first set of symptoms are called adrenergic. Patients may experience any of the following nervousness, sweating, intense hunger, trembling, weakness, palpitations, and difficulty speaking. When BS fall below 50 the patient is unable to talk and take oral therapy.  Would recommend Glucagon emergency kit for the patient and education for family and friends around the patient.   The acute management of hypoglycemia involves the rapid delivery of a source of easily absorbed sugar. Regular soda, juice, lifesavers, table sugar, are good options. 15 grams of glucose is the dose that is given, followed by an assessment of symptoms and a blood glucose check if possible. If after 10 minutes there is no improvement, another 10-15 grams should be given. This can be repeated up to three times. The equivalency of 10-15 grams of glucose (approximate servings) are: Four lifesavers, 4 teaspoons of sugar, or 1/2 cup or 4 oz of juice or regular pop.    Discussed indications, risks and benefits of all medications prescribed, and answered questions to patient's satisfaction.  The longitudinal plan of care for the diagnosis(es)/condition(s) as documented were addressed during this visit. Due to the added complexity in care, I will continue to support Malcolm in the subsequent management and with ongoing continuity of care.  All questions were answered.  The patient indicates understanding of the above issues and agrees with the plan set forth.      Follow-up:  As noted in AVS    Zakia Fox M.D  Endocrinology  [facility]  CC: No Ref-Primary, Physician    Disclaimer: This note consists of symbols derived from keyboarding, dictation and/or voice recognition software. As a result, there may be errors in the script that have gone undetected. Please consider this when interpreting information found in this chart.    Addendum to above note and clinic visit:    Labs  reviewed.    See result note/telephone encounter.

## 2024-04-17 NOTE — LETTER
2024         RE: Heidi Madden  [address] [place] [address]        Dear Colleague,    Thank you for referring your patient, Heidi Madden, to the [facility]. Please see a copy of my visit note below.    Endocrinology Clinic Note:  Name: Heidi Madden  Seen for f/u of type 1 Diabetes.  HPI:  Heidi Madden is a 23 year old female who presents for the evaluation/management of type 1 diabetes.  Was seen by pediatric endocrinologist  before.    Using MDI and Dexcom.  She is not interested in pump at this time but wants to think about it.    She works at [email or web address].  She works in shifts.  Living with mother and sister.    Wt Readings from Last 2 Encounters:   24 62.9 kg (138 lb 11.2 oz)   10/26/23 61.2 kg (135 lb)        1. Type 1 DM:  Orginally diagnosed: 3/16/2018  Hospitalization for DKA: no  Current Regimen:   Semglee: 18 units/day ( 19 units during periods)  Humalo unit/ 6-8 gm of Carbs + sliding scale insulin 1 unit 50 > 150.  BF: 1 unit/5 gm of carbs  Lunch: 1 unit/7 gm of carbs  Dinner: 1unit/7 gm of carbs  Typically takes about 11-20 TID.  ( mostly estimating)    yes:     Diabetes Medication(s)       Diabetic Other       Glucagon (BAQSIMI TWO PACK) 3 MG/DOSE POWD Spray 1 spray (3 mg) in nostril as needed (hypoglycemic siezure or unconsciousness)       Insulin       Insulin Glargine-yfgn (SEMGLEE, YFGN,) 100 UNIT/ML SOPN Inject 18 Units Subcutaneous daily     insulin lispro (HUMALOG KWIKPEN) 100 UNIT/ML (1 unit dial) KWIKPEN INJECT UP TO 70 UNITS DAILY            BS checks: dexcom  Average Meter Download: Blood glucose data reviewed personally. See nursing note from this encounter for details.  Blood sugars are mostly in acceptable range.  Exercise: walks/ bike ride everyday.  Last A1c: 6.3%  Symptoms of hypoglycemia (low blood sugar): yes  Using PUMP: No    DM Complications:   Complications:   Diabetes Complications  Description / Detail     Diabetic Retinopathy  No   CAD / PAD  No   Neuropathy  No   Nephropathy / Microalbuminuria  No  No results found for: UMALCR      Gastroparesis  No   Hypoglycemia [place]  No       2. Hypertension: Blood Pressure today:   BP Readings from Last 3 Encounters:   04/17/24 124/82   03/27/24 121/69   10/26/23 110/64     3. Hyperlipidemia:  On medication    PMH/PSH:  History reviewed. No pertinent past medical history.  Past Surgical History:   Procedure Laterality Date     ZZC REIMPLANT URETER,VESICOPSOAS HITCH  01/05     Family Hx:  Family History   Problem Relation Age of Onset     Thyroid Disease Mother         hashimoyoto'd     Family History Negative Father      Lipids Father      Psychotic Disorder Father         anger mood swings     Lipids Maternal Grandmother      Hypertension Maternal Grandfather      Diabetes Paternal Grandmother      Thyroid Disease Paternal Grandmother         hypo, partial thyroidectomy     Circulatory Paternal Grandmother         coroid arteries clogged, scraped     Heart Disease Paternal Grandfather         Angioplasty     Alcohol/Drug Paternal Grandfather      Family History Negative Sister      Family History Negative Sister      Lipids Sister         as early teen     Psychotic Disorder Other         bipolar  2 first cousins     Cancer No family hx of         sno cancer. maternal uncle           DM2: aunt, uncle and pgm.           Social Hx:  Social History     Socioeconomic History     Marital status: Single     Spouse name: Not on file     Number of children: Not on file     Years of education: Not on file     Highest education level: Not on file   Occupational History     Not on file   Tobacco Use     Smoking status: Never     Smokeless tobacco: Never     Tobacco comments:     No one in family smokes.   Substance and Sexual Activity     Alcohol use: No     Drug use: No     Sexual activity: Never     Comment: constantine march 2018   Other Topics Concern     Not on file   Social History  Narrative     Not on file     Social Determinants of Health     Financial Resource Strain: Low Risk  (2/15/2024)    [facility]    Financial Resource Strain      Difficulty of Paying Living Expenses: 3      Difficulty of Paying Living Expenses: Not on file   Food Insecurity: No Food Insecurity (2/15/2024)    [facility] from [facility], [facility]    Food Insecurity      Worried About Running Out of Food in the Last Year: 1   Transportation Needs: No Transportation Needs (2/15/2024)    Received from [facility], [facility]    Transportation Needs      Lack of Transportation (Medical): 1   Physical Activity: Not on file   Stress: Not on file   Social Connections: Socially Integrated (2/15/2024)    [facility] from [facility], [facility]    Social Connections      Frequency of Communication with Friends and Family: 0   Interpersonal Safety: Not on file   Housing Stability: Low Risk  (2/15/2024)    Received from [facility], [facility]    Housing Stability      Unable to Pay for Housing in the Last Year: 1          MEDICATIONS:  has a current medication list which includes the following prescription(s): reason aspirin not prescribed (intentional), bd pen needle lynda 2nd gen, dexcom g6 , dexcom g6 sensor, dexcom g6 transmitter, baqsimi two pack, insulin glargine-yfgn, insulin lispro, onetouch delica lancets 33g, onetouch verio iq, statin not prescribed, and urine glucose-ketones test.    ROS     ROS: 10 point ROS neg other than the symptoms noted above in the HPI.    Physical Exam   VS: /82 (BP Location: Left arm, Patient Position: Chair, Cuff Size: Adult  "Regular)   Pulse 92   Temp 99.1  F (37.3  C) (Tympanic)   Resp 16   Ht 1.651 m (5' 5\")   Wt 62.9 kg (138 lb 11.2 oz)   LMP 03/24/2024   SpO2 96%   Breastfeeding No   BMI 23.08 kg/m    GENERAL: healthy, alert and no distress  EYES: Eyes grossly normal to inspection, conjunctivae and sclerae normal  ENT: no nose swelling, nasal discharge.  Thyroid: no apparent thyroid nodules  RESP: no audible wheeze, cough, or visible cyanosis.  No visible retractions or increased work of breathing.  Able to speak fully in complete sentences.  ABDO: not evaluated.  EXTREMITIES: no hand tremors.  NEURO: Cranial nerves grossly intact, mentation intact and speech normal  SKIN: No apparent skin lesions, rash or edema seen   PSYCH: mentation appears normal, affect normal/bright, judgement and insight intact, normal speech and appearance well-groomed  DM FOOT EXAM: normal DP and PT pulses, no trophic changes or ulcerative lesions, normal sensory exam and normal monofilament exam.     LABS:  A1c:  Lab Results   Component Value Date    A1C 6.3 04/10/2024    A1C 6.4 10/11/2023    A1C 6.3 06/14/2023    A1C 6.5 01/04/2023    A1C 6.1 05/25/2022    A1C 5.5 12/10/2019    A1C 6.1 09/10/2019    A1C 6.1 06/18/2019    A1C 5.7 02/19/2019    A1C 6.0 11/20/2018     BMP:  Creatinine   Date Value Ref Range Status   10/11/2023 0.69 0.51 - 1.17 mg/dL Final     Comment:     Male and Female  0-2 Months    0.31-0.88 mg/dL  2-12 Months   0.16-0.39 mg/dL  1-2 Years     0.18-0.35 mg/dL  3-4 Years     0.26-0.42 mg/dL  5-6 Years     0.29-0.47 mg/dL  7-8 Years     0.34-0.53 mg/dL  9-10 Years    0.33-0.64 mg/dL  11-12 Years   0.44-0.68 mg/dL  13-14 Years   0.46-0.77 mg/dL    Female  15 Years and older  0.51-0.95 mg/dL    Male  15 Years and older  0.67-1.17 mg/dL       03/16/2018 0.56 0.50 - 1.00 mg/dL Final     Urine Micro:  Lab Results   Component Value Date    [facility]  06/14/2023      Comment:      Unable to calculate, urine albumin and/or urine creatinine " is outside detectable limits.  Microalbuminuria is defined as an albumin:creatinine ratio of 17 to 299 for males and 25 to 299 for females. A ratio of albumin:creatinine of 300 or higher is indicative of overt proteinuria.  Due to biologic variability, positive results should be confirmed by a second, first-morning random or 24-hour timed urine specimen. If there is discrepancy, a third specimen is recommended. When 2 out of 3 results are in the microalbuminuria range, this is evidence for incipient nephropathy and warrants increased efforts at glucose control, blood pressure control, and institution of therapy with an angiotensin-converting-enzyme (ACE) inhibitor (if the patient can tolerate it).      UMALCR  05/25/2022      Comment:      Unable to calculate:  Urine creatinine or albumin value below detectable level         LFTs/Lipids:  Recent Labs   Lab Test 01/04/23  0818 03/15/19  0909   CHOL 168 176*   HDL 73 55   LDL 86 109   TRIG 47 58     TFTs:  TSH   Date Value Ref Range Status   10/11/2023 1.85 0.30 - 4.20 uIU/mL Final   05/25/2022 1.82 0.40 - 4.00 mU/L Final   11/23/2018 3.52 0.40 - 4.00 mU/L Final       Blood Glucose and pump data/ Meter reviewed.     All pertinent notes, labs, and images personally reviewed by me.       Glucometer/ insulin pump (if applicable)/ CGM data (if applicable) downloaded, Personally reviewed and interpreted.  All Blood sugar data reviewed personally and interpreted as well as discussed with pt.  All past medical, social and Family history reviewed and updated in [facility].    A/P  Ms.Abby TYESHA Madden is a 23 year old here for the evaluation/management of diabetes:    1. DM1 - Under Fair control.  A1c 6.3%.  No known diabetes related complications.  She is using multiple injections plus dexcom.  She is not interested in insulin pump at this time.  In general blood sugars are in acceptable range.noted high BG after breakfast and lunch  Plan:  Discussed diagnosis, pathophysiology,  management and treatment options of condition with pt.  I also discussed importance of strict blood sugar control to prevent complications associated with uncontrolled diabetes.  Decrease Semglee: to 16-18 units/day ( decrease by 1-2 units/day)  Humalog: continue current sliding scale insulin 1 unit 50 > 150.  BF: take 1 unit/4 gm of carbs  Lunch: take 1 unit/6 gm of carbs  Dinner: continue 1unit/7 gm of carbs  Continue to use Dexcom G6.  Inform us if you need Rx for Dexcom G7.  Labs in 6-7 months  Please make a lab appointment for blood work and follow up clinic appointment in 1 week after that to discuss results.    2. Hypertension - Under Good  control. Not on medication.    3. Hyperlipidemia - Under Good control. Not on medication. LDL 86  4. Prevention  Ophthalmology-recommend annually  ASA-not indicated secondary to age  Smoking-non-smoker    Foot exam- 6/14/2023      Most Recent Immunizations   Administered Date(s) Administered     COVID-19 Bivalent 12+ (Pfizer) 06/24/2023     COVID-19 Monovalent 18+ (Moderna) 05/18/2021     Comvax (HIB/HepB) 07/11/2001     DTAP (<7y) 06/29/2004     HEPA 03/26/2013     HPV 03/26/2013     Influenza Intranasal Vaccine 09/21/2013     Influenza Vaccine >6 months,quad, PF 10/17/2023     Influenza Vaccine, 6+MO IM (QUADRIVALENT W/PRESERVATIVES) 10/15/2019     Influenza,INJ,MDCK,PF,Quad >6mo(Flucelvax) 10/17/2023     MMR 06/29/2004     Meningococcal ACWY (Menactra ) 07/11/2016     Nasal Influenza Vaccine 2-49 (FluMist) 11/23/2018     Pneumo Conj 13-V (2010&after) 11/14/2019     Pneumococcal (PCV 7) 07/11/2001     Pneumococcal 23 valent 01/16/2020     Poliovirus, inactivated (IPV) 06/29/2004     TDAP (Adacel,Boostrix) 05/24/2022     TDAP Vaccine (Boostrix) 06/27/2011     Typhoid Oral 03/27/2017     Varicella 06/27/2011         Recommend checking blood sugars before meals and at bedtime.    If Blood glucose are low more often-> 2-3 times/week- give us a call.  The patient is advised  to Make better food choices: reduce carbs, Reduce portion size, weight loss and exercise 3-4 times a week.  Discussed hypoglycemia signs and symptoms as well as management in detail.      There is some variability among people, most will usually develop symptoms suggestive of hypoglycemia when blood glucose levels are lowered to the mid 60's. The first set of symptoms are called adrenergic. Patients may experience any of the following nervousness, sweating, intense hunger, trembling, weakness, palpitations, and difficulty speaking. When BS fall below 50 the patient is unable to talk and take oral therapy.  Would recommend Glucagon emergency kit for the patient and education for family and friends around the patient.   The acute management of hypoglycemia involves the rapid delivery of a source of easily absorbed sugar. Regular soda, juice, lifesavers, table sugar, are good options. 15 grams of glucose is the dose that is given, followed by an assessment of symptoms and a blood glucose check if possible. If after 10 minutes there is no improvement, another 10-15 grams should be given. This can be repeated up to three times. The equivalency of 10-15 grams of glucose (approximate servings) are: Four lifesavers, 4 teaspoons of sugar, or 1/2 cup or 4 oz of juice or regular pop.    Discussed indications, risks and benefits of all medications prescribed, and answered questions to patient's satisfaction.  The longitudinal plan of care for the diagnosis(es)/condition(s) as documented were addressed during this visit. Due to the added complexity in care, I will continue to support Malcolm in the subsequent management and with ongoing continuity of care.  All questions were answered.  The patient indicates understanding of the above issues and agrees with the plan set forth.      Follow-up:  As noted in AVS    Zakia Fox M.D  Endocrinology  [facility]  CC: No Ref-Primary, Physician    Disclaimer: This  note consists of symbols derived from keyboarding, dictation and/or voice recognition software. As a result, there may be errors in the script that have gone undetected. Please consider this when interpreting information found in this chart.    Addendum to above note and clinic visit:    Labs reviewed.    See result note/telephone encounter.            Again, thank you for allowing me to participate in the care of your patient.        Sincerely,        Zakia Fox MD

## 2024-06-26 ENCOUNTER — TRANSFERRED RECORDS (OUTPATIENT)
Dept: HEALTH INFORMATION MANAGEMENT | Facility: CLINIC | Age: 24
End: 2024-06-26
Payer: COMMERCIAL

## 2024-07-05 DIAGNOSIS — E10.65 TYPE 1 DIABETES MELLITUS WITH HYPERGLYCEMIA (H): ICD-10-CM

## 2024-07-05 RX ORDER — PEN NEEDLE, DIABETIC 32GX 5/32"
NEEDLE, DISPOSABLE MISCELLANEOUS
Qty: 810 EACH | Refills: 1 | Status: SHIPPED | OUTPATIENT
Start: 2024-07-05

## 2024-07-05 NOTE — TELEPHONE ENCOUNTER
Requested Prescriptions   Pending Prescriptions Disp Refills    BD PEN NEEDLE KRISTA 2ND GEN 32G X 4 MM miscellaneous [Pharmacy Med Name: BD PEN MERE KRISTA 2GEN 4MM 90'S 32G5/32]  3     Sig: USE WITH INSULIN PEN 8 TIMES DAILY       Diabetic Supplies Protocol Passed - 7/5/2024 12:25 AM        Passed - Medication is active on med list        Passed - Recent (12 month) or future (90 days) visit with authorizing provider s specialty     The patient must have completed an in-person or virtual visit within the past 12 months or has a future visit scheduled within the next 90 days with the authorizing provider s specialty.  Urgent care and e-visits do not quality as an office visit for this protocol.          Passed - Medication indicated for associated diagnosis        Passed - Patient is 18 years of age or older

## 2024-07-16 ENCOUNTER — MYC REFILL (OUTPATIENT)
Dept: ENDOCRINOLOGY | Facility: CLINIC | Age: 24
End: 2024-07-16
Payer: COMMERCIAL

## 2024-07-16 DIAGNOSIS — E10.65 TYPE 1 DIABETES MELLITUS WITH HYPERGLYCEMIA (H): ICD-10-CM

## 2024-07-16 DIAGNOSIS — E10.649 TYPE 1 DIABETES MELLITUS WITH HYPOGLYCEMIA AND WITHOUT COMA (H): ICD-10-CM

## 2024-07-16 RX ORDER — PROCHLORPERAZINE 25 MG/1
1 SUPPOSITORY RECTAL
Qty: 9 EACH | Refills: 3 | OUTPATIENT
Start: 2024-07-16

## 2024-07-16 RX ORDER — INSULIN LISPRO 100 [IU]/ML
INJECTION, SOLUTION INTRAVENOUS; SUBCUTANEOUS
Qty: 60 ML | Refills: 0 | Status: SHIPPED | OUTPATIENT
Start: 2024-07-16

## 2024-07-16 RX ORDER — PROCHLORPERAZINE 25 MG/1
1 SUPPOSITORY RECTAL
Qty: 1 EACH | Refills: 2 | OUTPATIENT
Start: 2024-07-16

## 2024-07-16 RX ORDER — INSULIN GLARGINE-YFGN 100 [IU]/ML
18 INJECTION, SOLUTION SUBCUTANEOUS DAILY
Qty: 30 ML | Refills: 0 | Status: SHIPPED | OUTPATIENT
Start: 2024-07-16

## 2024-07-16 NOTE — TELEPHONE ENCOUNTER
Requested Prescriptions   Pending Prescriptions Disp Refills    Continuous Glucose Transmitter (DEXCOM G6 TRANSMITTER) MISC 1 each 2     Si each every 3 months       Diabetic Supplies Protocol Passed - 2024  5:47 AM        Passed - Medication is active on med list        Passed - Recent (12 month) or future (90 days) visit with authorizing provider s specialty     The patient must have completed an in-person or virtual visit within the past 12 months or has a future visit scheduled within the next 90 days with the authorizing provider s specialty.  Urgent care and e-visits do not quality as an office visit for this protocol.          Passed - Medication indicated for associated diagnosis        Passed - Patient is 18 years of age or older

## 2024-07-16 NOTE — TELEPHONE ENCOUNTER
Requested Prescriptions   Pending Prescriptions Disp Refills    insulin lispro (HUMALOG KWIKPEN) 100 UNIT/ML (1 unit dial) KWIKPEN 60 mL 0     Sig: INJECT UP TO 70 UNITS DAILY       Insulin Protocol Failed - 7/16/2024  6:04 AM        Failed - Chart Review Required     Review Chart.    Do not approve if insulin is used in a pump.  Instead, direct refill request to the patient's endocrinologist.  If the patient doesn't have an endocrinologist, then send the refill to the patient's PCP for review            Passed - Medication is active on med list        Passed - Has GFR on file in past 12 months and most recent value is normal        Passed - Recent (6 mo) or future (90 days) visit within the authorizing provider's specialty     The patient must have completed an in-person or virtual visit within the past 6 months or has a future visit scheduled within the next 90 days with the authorizing provider s specialty.  Urgent care and e-visits do not quality as an office visit for this protocol.          Passed - Medication indicated for associated diagnosis     Medication is associated with one or more of the following diagnoses:   - Type 1 diabetes mellitus  - Type 2 diabetes mellitus  - Diabetic nephropathy; Prophylaxis  - Neuropathy due to diabetes mellitus; Prophylaxis  - Retinopathy due to diabetes mellitus; Prophylaxis  - Diabetes mellitus associated with cystic fibrosis  - Disorder of cardiovascular system; Prophylaxis - Type 1 diabetes mellitus   - Disorder of cardiovascular system; Prophylaxis - Type 2 diabetes mellitus            Passed - Patient is 18 years of age or older          Insulin Glargine-yfgn (SEMGLEE (YFGN)) 100 UNIT/ML SOPN 30 mL 0     Sig: Inject 18 Units subcutaneously daily       Insulin Protocol Failed - 7/16/2024  6:04 AM        Failed - Chart Review Required     Review Chart.    Do not approve if insulin is used in a pump.  Instead, direct refill request to the patient's endocrinologist.  If  the patient doesn't have an endocrinologist, then send the refill to the patient's PCP for review            Passed - Medication is active on med list        Passed - Has GFR on file in past 12 months and most recent value is normal        Passed - Recent (6 mo) or future (90 days) visit within the authorizing provider's specialty     The patient must have completed an in-person or virtual visit within the past 6 months or has a future visit scheduled within the next 90 days with the authorizing provider s specialty.  Urgent care and e-visits do not quality as an office visit for this protocol.          Passed - Medication indicated for associated diagnosis     Medication is associated with one or more of the following diagnoses:   - Type 1 diabetes mellitus  - Type 2 diabetes mellitus  - Diabetic nephropathy; Prophylaxis  - Neuropathy due to diabetes mellitus; Prophylaxis  - Retinopathy due to diabetes mellitus; Prophylaxis  - Diabetes mellitus associated with cystic fibrosis  - Disorder of cardiovascular system; Prophylaxis - Type 1 diabetes mellitus   - Disorder of cardiovascular system; Prophylaxis - Type 2 diabetes mellitus            Passed - Patient is 18 years of age or older          Continuous Glucose Sensor (DEXCOM G6 SENSOR) MISC 9 each 3     Si each every 10 days       There is no refill protocol information for this order

## 2024-07-20 ENCOUNTER — MYC REFILL (OUTPATIENT)
Dept: ENDOCRINOLOGY | Facility: CLINIC | Age: 24
End: 2024-07-20
Payer: COMMERCIAL

## 2024-07-20 DIAGNOSIS — E10.65 TYPE 1 DIABETES MELLITUS WITH HYPERGLYCEMIA (H): ICD-10-CM

## 2024-07-22 RX ORDER — GLUCAGON 3 MG/1
3 POWDER NASAL PRN
Qty: 1 EACH | Refills: 1 | Status: SHIPPED | OUTPATIENT
Start: 2024-07-22

## 2024-07-22 NOTE — TELEPHONE ENCOUNTER
Requested Prescriptions   Pending Prescriptions Disp Refills    Glucagon (BAQSIMI TWO PACK) 3 MG/DOSE nasal powder 1 each 1     Sig: Spray 1 spray (3 mg) in nostril as needed (hypoglycemic siezure or unconsciousness)       Glucagon Protocol Passed - 7/20/2024  6:53 PM        Passed - Medication is active on med list        Passed - Patient is 18 years of age or older

## 2024-10-09 ENCOUNTER — ALLIED HEALTH/NURSE VISIT (OUTPATIENT)
Dept: ENDOCRINOLOGY | Facility: CLINIC | Age: 24
End: 2024-10-09
Payer: COMMERCIAL

## 2024-10-09 VITALS — HEART RATE: 75 BPM | DIASTOLIC BLOOD PRESSURE: 69 MMHG | SYSTOLIC BLOOD PRESSURE: 101 MMHG

## 2024-10-09 DIAGNOSIS — Z01.30 BP CHECK: Primary | ICD-10-CM

## 2024-10-09 PROCEDURE — 99207 PR NO CHARGE NURSE ONLY: CPT | Performed by: INTERNAL MEDICINE

## 2024-10-09 NOTE — PROGRESS NOTES
Heidi Kramercornelius was evaluated at [facility] on October 9, 2024 at which time Malcolm's blood pressure was:    BP Readings from Last 1 Encounters:   10/09/24 101/69     No data recorded      Reviewed lifestyle modifications for blood pressure control and reduction: including making healthy food choices, managing weight, getting regular exercise, smoking cessation, reducing alcohol consumption, monitoring blood pressure regularly.     Symptoms: None    BP Goal:< 140/90 mmHg    BP Assessment:  BP at goal    Potential Reasons for BP too high: NA - Not applicable    BP Follow-Up Plan: Recheck BP in 6 months at pharmacy    Recommendation to Provider: no change at this time.    La Gill, PharmD  [facility]  PH: [phone]      Note completed by: La Gill, [facility]

## 2024-10-16 ENCOUNTER — MYC REFILL (OUTPATIENT)
Dept: ENDOCRINOLOGY | Facility: CLINIC | Age: 24
End: 2024-10-16
Payer: COMMERCIAL

## 2024-10-16 DIAGNOSIS — E10.649 TYPE 1 DIABETES MELLITUS WITH HYPOGLYCEMIA AND WITHOUT COMA (H): ICD-10-CM

## 2024-10-17 NOTE — TELEPHONE ENCOUNTER
Requested Prescriptions   Pending Prescriptions Disp Refills    Insulin Glargine-yfgn (SEMGLEE (YFGN)) 100 UNIT/ML SOPN 30 mL 0     Sig: Inject 18 Units subcutaneously daily.       Insulin Protocol Failed - 10/16/2024 10:07 PM        Failed - Has GFR on file in past 12 months and most recent value is normal        Failed - Chart Review Required     Review Chart.    Do not approve if insulin is used in a pump.  Instead, direct refill request to the patient's endocrinologist.  If the patient doesn't have an endocrinologist, then send the refill to the patient's PCP for review            Passed - Medication is active on med list        Passed - Recent (6 mo) or future (90 days) visit within the authorizing provider's specialty     The patient must have completed an in-person or virtual visit within the past 6 months or has a future visit scheduled within the next 90 days with the authorizing provider s specialty.  Urgent care and e-visits do not quality as an office visit for this protocol.          Passed - Medication indicated for associated diagnosis     Medication is associated with one or more of the following diagnoses:   - Type 1 diabetes mellitus  - Type 2 diabetes mellitus  - Diabetic nephropathy; Prophylaxis  - Neuropathy due to diabetes mellitus; Prophylaxis  - Retinopathy due to diabetes mellitus; Prophylaxis  - Diabetes mellitus associated with cystic fibrosis  - Disorder of cardiovascular system; Prophylaxis - Type 1 diabetes mellitus   - Disorder of cardiovascular system; Prophylaxis - Type 2 diabetes mellitus            Passed - Patient is 18 years of age or older

## 2024-10-20 ENCOUNTER — MYC REFILL (OUTPATIENT)
Dept: ENDOCRINOLOGY | Facility: CLINIC | Age: 24
End: 2024-10-20
Payer: COMMERCIAL

## 2024-10-20 DIAGNOSIS — E10.65 TYPE 1 DIABETES MELLITUS WITH HYPERGLYCEMIA (H): ICD-10-CM

## 2024-10-21 RX ORDER — INSULIN GLARGINE-YFGN 100 [IU]/ML
18 INJECTION, SOLUTION SUBCUTANEOUS DAILY
Qty: 30 ML | Refills: 0 | Status: SHIPPED | OUTPATIENT
Start: 2024-10-21

## 2024-10-21 RX ORDER — PROCHLORPERAZINE 25 MG/1
1 SUPPOSITORY RECTAL
Qty: 9 EACH | Refills: 3 | OUTPATIENT
Start: 2024-10-21

## 2024-11-03 ENCOUNTER — HEALTH MAINTENANCE LETTER (OUTPATIENT)
Age: 24
End: 2024-11-03

## 2024-11-08 ENCOUNTER — MYC REFILL (OUTPATIENT)
Dept: ENDOCRINOLOGY | Facility: CLINIC | Age: 24
End: 2024-11-08
Payer: COMMERCIAL

## 2024-11-08 DIAGNOSIS — E10.65 TYPE 1 DIABETES MELLITUS WITH HYPERGLYCEMIA (H): ICD-10-CM

## 2024-11-08 RX ORDER — PROCHLORPERAZINE 25 MG/1
1 SUPPOSITORY RECTAL
Qty: 1 EACH | Refills: 2 | OUTPATIENT
Start: 2024-11-08

## 2024-11-08 NOTE — TELEPHONE ENCOUNTER
Requested Prescriptions   Pending Prescriptions Disp Refills    Continuous Glucose Transmitter (DEXCOM G6 TRANSMITTER) MISC 1 each 2     Si each every 3 months.       Diabetic Supplies Protocol Passed - 2024  9:42 AM        Passed - Medication is active on med list        Passed - Recent (12 month) or future (90 days) visit with authorizing provider s specialty     The patient must have completed an in-person or virtual visit within the past 12 months or has a future visit scheduled within the next 90 days with the authorizing provider s specialty.  Urgent care and e-visits do not quality as an office visit for this protocol.          Passed - Medication indicated for associated diagnosis        Passed - Patient is 18 years of age or older

## 2024-11-18 ENCOUNTER — MYC REFILL (OUTPATIENT)
Dept: ENDOCRINOLOGY | Facility: CLINIC | Age: 24
End: 2024-11-18
Payer: COMMERCIAL

## 2024-11-18 DIAGNOSIS — E10.65 TYPE 1 DIABETES MELLITUS WITH HYPERGLYCEMIA (H): ICD-10-CM

## 2024-11-18 RX ORDER — INSULIN LISPRO 100 [IU]/ML
INJECTION, SOLUTION INTRAVENOUS; SUBCUTANEOUS
Qty: 20 ML | Refills: 0 | Status: SHIPPED | OUTPATIENT
Start: 2024-11-18

## 2024-11-18 NOTE — TELEPHONE ENCOUNTER
Requested Prescriptions   Pending Prescriptions Disp Refills    insulin lispro (HUMALOG KWIKPEN) 100 UNIT/ML (1 unit dial) KWIKPEN 60 mL 0     Sig: INJECT UP TO 70 UNITS DAILY       Insulin Protocol Failed - 11/18/2024 11:11 AM        Failed - HgbA1C in past 3 or 6 months     If HgbA1C is 8 or greater, it needs to be on file within the past 3 months.  If less than 8, must be on file within the past 6 months.     Recent Labs   Lab Test 04/10/24  1049   A1C 6.3*             Failed - Has GFR on file in past 12 months and most recent value is normal        Failed - Chart review required     Review Chart.    Do not approve if insulin is used in a pump.  Instead, direct refill request to the patient's endocrinologist.  If the patient doesn't have an endocrinologist, then send the refill to the patient's PCP for review            Passed - Medication is active on med list        Passed - Recent (6 mo) or future (90 days) visit within the authorizing provider's specialty     The patient must have completed an in-person or virtual visit within the past 6 months or has a future visit scheduled within the next 90 days with the authorizing provider s specialty.  Urgent care and e-visits do not quality as an office visit for this protocol.          Passed - Medication indicated for associated diagnosis     Medication is associated with one or more of the following diagnoses:   - Type 1 diabetes mellitus  - Type 2 diabetes mellitus  - Diabetic nephropathy; Prophylaxis  - Neuropathy due to diabetes mellitus; Prophylaxis  - Retinopathy due to diabetes mellitus; Prophylaxis  - Diabetes mellitus associated with cystic fibrosis  - Disorder of cardiovascular system; Prophylaxis - Type 1 diabetes mellitus   - Disorder of cardiovascular system; Prophylaxis - Type 2 diabetes mellitus            Passed - Patient is 18 years of age or older

## 2024-11-27 ENCOUNTER — LAB (OUTPATIENT)
Dept: LAB | Facility: CLINIC | Age: 24
End: 2024-11-27
Payer: COMMERCIAL

## 2024-11-27 DIAGNOSIS — E10.649 TYPE 1 DIABETES MELLITUS WITH HYPOGLYCEMIA AND WITHOUT COMA (H): ICD-10-CM

## 2024-11-27 LAB
CREAT SERPL-MCNC: 0.67 MG/DL (ref 0.51–1.17)
CREAT UR-MCNC: 11.6 MG/DL
EGFRCR SERPLBLD CKD-EPI 2021: >90 ML/MIN/1.73M2
EST. AVERAGE GLUCOSE BLD GHB EST-MCNC: 120 MG/DL
HBA1C MFR BLD: 5.8 % (ref 0–5.6)
MICROALBUMIN UR-MCNC: <12 MG/L
MICROALBUMIN/CREAT UR: NORMAL MG/G{CREAT}
TSH SERPL DL<=0.005 MIU/L-ACNC: 2.54 UIU/ML (ref 0.3–4.2)

## 2024-11-27 PROCEDURE — 84443 ASSAY THYROID STIM HORMONE: CPT

## 2024-11-27 PROCEDURE — 82043 UR ALBUMIN QUANTITATIVE: CPT

## 2024-11-27 PROCEDURE — 82570 ASSAY OF URINE CREATININE: CPT

## 2024-11-27 PROCEDURE — 82565 ASSAY OF CREATININE: CPT

## 2024-11-27 PROCEDURE — 83036 HEMOGLOBIN GLYCOSYLATED A1C: CPT

## 2024-11-27 PROCEDURE — 36415 COLL VENOUS BLD VENIPUNCTURE: CPT

## 2024-12-01 ENCOUNTER — MYC REFILL (OUTPATIENT)
Dept: ENDOCRINOLOGY | Facility: CLINIC | Age: 24
End: 2024-12-01
Payer: COMMERCIAL

## 2024-12-01 DIAGNOSIS — E10.65 TYPE 1 DIABETES MELLITUS WITH HYPERGLYCEMIA (H): ICD-10-CM

## 2024-12-01 RX ORDER — INSULIN LISPRO 100 [IU]/ML
INJECTION, SOLUTION INTRAVENOUS; SUBCUTANEOUS
Qty: 20 ML | Refills: 0 | Status: CANCELLED | OUTPATIENT
Start: 2024-12-01

## 2024-12-02 NOTE — TELEPHONE ENCOUNTER
1 month supply sent 11/18 pt has follow up 12/4    Requested Prescriptions   Pending Prescriptions Disp Refills    insulin lispro (HUMALOG KWIKPEN) 100 UNIT/ML (1 unit dial) KWIKPEN 20 mL 0     Sig: INJECT UP TO 70 UNITS DAILY       Insulin Protocol Failed - 12/2/2024  1:25 PM        Failed - Chart review required     Review Chart.    Do not approve if insulin is used in a pump.  Instead, direct refill request to the patient's endocrinologist.  If the patient doesn't have an endocrinologist, then send the refill to the patient's PCP for review            Passed - HgbA1C in past 3 or 6 months     If HgbA1C is 8 or greater, it needs to be on file within the past 3 months.  If less than 8, must be on file within the past 6 months.     Recent Labs   Lab Test 11/27/24  1533   A1C 5.8*             Passed - Medication is active on med list        Passed - Recent (6 mo) or future (90 days) visit within the authorizing provider's specialty     The patient must have completed an in-person or virtual visit within the past 6 months or has a future visit scheduled within the next 90 days with the authorizing provider s specialty.  Urgent care and e-visits do not quality as an office visit for this protocol.          Passed - Medication indicated for associated diagnosis     Medication is associated with one or more of the following diagnoses:   - Type 1 diabetes mellitus  - Type 2 diabetes mellitus  - Diabetic nephropathy; Prophylaxis  - Neuropathy due to diabetes mellitus; Prophylaxis  - Retinopathy due to diabetes mellitus; Prophylaxis  - Diabetes mellitus associated with cystic fibrosis  - Disorder of cardiovascular system; Prophylaxis - Type 1 diabetes mellitus   - Disorder of cardiovascular system; Prophylaxis - Type 2 diabetes mellitus            Passed - Patient is 18 years of age or older

## 2024-12-04 ENCOUNTER — OFFICE VISIT (OUTPATIENT)
Dept: ENDOCRINOLOGY | Facility: CLINIC | Age: 24
End: 2024-12-04
Payer: COMMERCIAL

## 2024-12-04 VITALS
DIASTOLIC BLOOD PRESSURE: 72 MMHG | WEIGHT: 140.9 LBS | TEMPERATURE: 98.1 F | BODY MASS INDEX: 23.47 KG/M2 | OXYGEN SATURATION: 100 % | HEART RATE: 70 BPM | SYSTOLIC BLOOD PRESSURE: 118 MMHG | HEIGHT: 65 IN | RESPIRATION RATE: 16 BRPM

## 2024-12-04 DIAGNOSIS — E10.649 TYPE 1 DIABETES MELLITUS WITH HYPOGLYCEMIA AND WITHOUT COMA (H): Primary | ICD-10-CM

## 2024-12-04 DIAGNOSIS — E10.65 TYPE 1 DIABETES MELLITUS WITH HYPERGLYCEMIA (H): ICD-10-CM

## 2024-12-04 PROCEDURE — G2211 COMPLEX E/M VISIT ADD ON: HCPCS | Performed by: INTERNAL MEDICINE

## 2024-12-04 PROCEDURE — 95251 CONT GLUC MNTR ANALYSIS I&R: CPT | Performed by: INTERNAL MEDICINE

## 2024-12-04 PROCEDURE — 99214 OFFICE O/P EST MOD 30 MIN: CPT | Performed by: INTERNAL MEDICINE

## 2024-12-04 RX ORDER — ACYCLOVIR 400 MG/1
1 TABLET ORAL
Qty: 9 EACH | Refills: 3 | Status: SHIPPED | OUTPATIENT
Start: 2024-12-04

## 2024-12-04 RX ORDER — KETOCONAZOLE 20 MG/G
CREAM TOPICAL
COMMUNITY
Start: 2024-05-03

## 2024-12-04 RX ORDER — INSULIN LISPRO 100 [IU]/ML
INJECTION, SOLUTION INTRAVENOUS; SUBCUTANEOUS
Qty: 30 ML | Refills: 3 | Status: SHIPPED | OUTPATIENT
Start: 2024-12-04

## 2024-12-04 RX ORDER — INSULIN GLARGINE-YFGN 100 [IU]/ML
15 INJECTION, SOLUTION SUBCUTANEOUS DAILY
Qty: 15 ML | Refills: 3 | Status: SHIPPED | OUTPATIENT
Start: 2024-12-04

## 2024-12-04 RX ORDER — ACYCLOVIR 400 MG/1
1 TABLET ORAL ONCE
Qty: 1 EACH | Refills: 3 | Status: SHIPPED | OUTPATIENT
Start: 2024-12-04 | End: 2024-12-04

## 2024-12-04 NOTE — LETTER
2024      Heidi Madden  [address] [place] [address]      Dear Colleague,    Thank you for referring your patient, Heidi Madden, to the [facility]. Please see a copy of my visit note below.    Endocrinology Clinic Note:  Name: Heidi Madden  Seen for f/u of type 1 Diabetes.  HPI:  Heidi Madden is a 23 year old female who presents for the evaluation/management of type 1 diabetes.  Was seen by pediatric endocrinologist  before.    Using MDI and Dexcom G6.  She may be interested in pump at this time but wants to think about it.    She works at [employer].  She works in shifts.  Living with mother and sister.    Wt Readings from Last 2 Encounters:   24 63.9 kg (140 lb 14.4 oz)   24 62.9 kg (138 lb 11.2 oz)        1. Type 1 DM:  Orginally diagnosed: 3/16/2018  Hospitalization for DKA: no  Current Regimen:   Semglee: 15 units/day at night ( 16-17 units during periods)  Humalo unit/ 6-10 gm of Carbs + sliding scale insulin 1 unit 50 > 150.  BF: 1 unit/6 gm of carbs  Lunch: 1 unit/9-10 gm of carbs  Dinner: 1unit/7-8 gm of carbs  Typically takes about 11-24 TID.  ( mostly estimating)    yes:     Diabetes Medication(s)       Diabetic Other       Glucagon (BAQSIMI TWO PACK) 3 MG/DOSE nasal powder Spray 1 spray (3 mg) in nostril as needed (hypoglycemic siezure or unconsciousness)       Insulin       Insulin Glargine-yfgn (SEMGLEE, YFGN,) 100 UNIT/ML SOPN Inject 18 Units subcutaneously daily.     insulin lispro (HUMALOG KWIKPEN) 100 UNIT/ML (1 unit dial) KWIKPEN INJECT UP TO 70 UNITS DAILY            BS checks: dexcom  Average Meter Download: Blood glucose data reviewed personally. See nursing note from this encounter for details.  Blood sugars are mostly in acceptable range.  Exercise: walks/ bike ride everyday.  Last A1c: 6.3%  Symptoms of hypoglycemia (low blood sugar): yes  Using PUMP: No    DM Complications:   Complications:   Diabetes  Complications  Description / Detail    Diabetic Retinopathy  No   CAD / PAD  No   Neuropathy  No   Nephropathy / Microalbuminuria  No  No results found for: UMALCR      Gastroparesis  No   Hypoglycemia [place]  No       2. Hypertension: Blood Pressure today:   BP Readings from Last 3 Encounters:   12/04/24 118/72   10/09/24 101/69   04/17/24 124/82     3. Hyperlipidemia:  On medication    PMH/PSH:  History reviewed. No pertinent past medical history.  Past Surgical History:   Procedure Laterality Date     C REIMPLANT URETER,VESICOPSOAS HITCH  01/05     Family Hx:  Family History   Problem Relation Age of Onset     Thyroid Disease Mother         hashimoyoto'd     Family History Negative Father      Lipids Father      Psychotic Disorder Father         anger mood swings     Lipids Maternal Grandmother      Hypertension Maternal Grandfather      Diabetes Paternal Grandmother      Thyroid Disease Paternal Grandmother         hypo, partial thyroidectomy     Circulatory Paternal Grandmother         coroid arteries clogged, scraped     Heart Disease Paternal Grandfather         Angioplasty     Alcohol/Drug Paternal Grandfather      Family History Negative Sister      Family History Negative Sister      Lipids Sister         as early teen     Psychotic Disorder Other         bipolar  2 first cousins     Cancer No family hx of         sno cancer. maternal uncle           DM2: aunt, uncle and pgm.           Social Hx:  Social History     Socioeconomic History     Marital status: Single     Spouse name: Not on file     Number of children: Not on file     Years of education: Not on file     Highest education level: Not on file   Occupational History     Not on file   Tobacco Use     Smoking status: Never     Smokeless tobacco: Never     Tobacco comments:     No one in family smokes.   Substance and Sexual Activity     Alcohol use: No     Drug use: No     Sexual activity: Never     Comment: constantine march 2018   Other Topics  Concern     Not on file   Social History Narrative     Not on file     Social Drivers of Health     Financial Resource Strain: Low Risk  (2/15/2024)    [facility]    Financial Resource Strain      Difficulty of Paying Living Expenses: 3      Difficulty of Paying Living Expenses: Not on file   Food Insecurity: No Food Insecurity (2/15/2024)    Received from [facility]    Food Insecurity      Do you worry your food will run out before you are able to buy more?: 1   Transportation Needs: No Transportation Needs (2/15/2024)    Received from [facility]    Transportation Needs      Does lack of transportation keep you from medical appointments?: 1      Does lack of transportation keep you from work, meetings or getting things that you need?: 1   Physical Activity: Not on file   Stress: Not on file   Social Connections: Socially Integrated (2/15/2024)    Received from [facility]    Social Connections      Do you often feel lonely or isolated from those around you?: 0   Interpersonal Safety: Not on file   Housing Stability: Low Risk  (2/15/2024)    Received from [facility]    Housing Stability      What is your housing situation today?: 1          MEDICATIONS:  has a current medication list which includes the following prescription(s): reason aspirin not prescribed (intentional), dexcom g6 , dexcom g6 sensor, dexcom g6 transmitter, baqsimi two pack, insulin glargine-yfgn, insulin lispro, bd pen needle lynda 2nd gen, ketoconazole, onetouch delica lancets 33g, onetouch verio iq, sertraline, statin not prescribed, and urine glucose-ketones test.    ROS     ROS: 10 point ROS neg other than the symptoms noted above in the HPI.    Physical Exam   VS: /72 (BP Location: Left arm, Patient  "Position: Chair, Cuff Size: Adult Regular)   Pulse 70   Temp 98.1  F (36.7  C) (Tympanic)   Resp 16   Ht 1.651 m (5' 5\")   Wt 63.9 kg (140 lb 14.4 oz)   LMP 11/04/2024 (Approximate)   SpO2 100%   Breastfeeding No   BMI 23.45 kg/m    GENERAL: healthy, alert and no distress  EYES: Eyes grossly normal to inspection, conjunctivae and sclerae normal  ENT: no nose swelling, nasal discharge.  Thyroid: no apparent thyroid nodules.  Thyroid appears normal in size and nontender.  CV: RRR, no rubs, gallops, no murmurs  RESP: CTAB, no wheezes, rales, or ronchi  ABDO: +BS  EXTREMITIES: no hand tremors.  NEURO: Cranial nerves grossly intact, mentation intact and speech normal  SKIN: No apparent skin lesions, rash or edema seen   PSYCH: mentation appears normal, affect normal/bright, judgement and insight intact, normal speech and appearance well-groomed      LABS:  A1c:  Lab Results   Component Value Date    A1C 5.8 11/27/2024    A1C 6.3 04/10/2024    A1C 6.4 10/11/2023    A1C 6.3 06/14/2023    A1C 6.5 01/04/2023    A1C 5.5 12/10/2019    A1C 6.1 09/10/2019    A1C 6.1 06/18/2019    A1C 5.7 02/19/2019    A1C 6.0 11/20/2018     BMP:  Creatinine   Date Value Ref Range Status   11/27/2024 0.67 0.51 - 1.17 mg/dL Final     Comment:     Male and Female  0-2 Months    0.31-0.88 mg/dL  2-12 Months   0.16-0.39 mg/dL  1-2 Years     0.18-0.35 mg/dL  3-4 Years     0.26-0.42 mg/dL  5-6 Years     0.29-0.47 mg/dL  7-8 Years     0.34-0.53 mg/dL  9-10 Years    0.33-0.64 mg/dL  11-12 Years   0.44-0.68 mg/dL  13-14 Years   0.46-0.77 mg/dL    Female  15 Years and older  0.51-0.95 mg/dL    Male  15 Years and older  0.67-1.17 mg/dL       03/16/2018 0.56 0.50 - 1.00 mg/dL Final     Urine Micro:  Lab Results   Component Value Date    [facility]  11/27/2024      Comment:      Unable to calculate, urine albumin and/or urine creatinine is outside detectable limits.  Microalbuminuria is defined as an albumin:creatinine ratio of 17 to 299 for males and 25 " to 299 for females. A ratio of albumin:creatinine of 300 or higher is indicative of overt proteinuria.  Due to biologic variability, positive results should be confirmed by a second, first-morning random or 24-hour timed urine specimen. If there is discrepancy, a third specimen is recommended. When 2 out of 3 results are in the microalbuminuria range, this is evidence for incipient nephropathy and warrants increased efforts at glucose control, blood pressure control, and institution of therapy with an angiotensin-converting-enzyme (ACE) inhibitor (if the patient can tolerate it).      UMALCR  05/25/2022      Comment:      Unable to calculate:  Urine creatinine or albumin value below detectable level       LFTs/Lipids:  Recent Labs   Lab Test 01/04/23  0818 03/15/19  0909   CHOL 168 176*   HDL 73 55   LDL 86 109   TRIG 47 58       TFTs:  TSH   Date Value Ref Range Status   11/27/2024 2.54 0.30 - 4.20 uIU/mL Final   05/25/2022 1.82 0.40 - 4.00 mU/L Final   11/23/2018 3.52 0.40 - 4.00 mU/L Final       Blood Glucose and pump data/ Meter reviewed.     All pertinent notes, labs, and images personally reviewed by me.       Glucometer/ insulin pump (if applicable)/ CGM data (if applicable) downloaded, Personally reviewed and interpreted.  All Blood sugar data reviewed personally and interpreted as well as discussed with pt.  All past medical, social and Family history reviewed and updated in [facility].    A/P  Ms.Abby TYESHA Madden is a 23 year old here for the evaluation/management of diabetes:    1. DM1 - Under Fair control.  A1c 5.8 %. Average blood sugar is 163 for the last 15 days but does not match with A1c.  Noted variable blood sugar data on a day to day basis.  No clear pattern is identified.  No known diabetes related complications.  She is using multiple injections plus dexcom.  She is maybe interested in insulin pump at this time and would like to think about it.  She is agreeable to be seen by diabetic educator for more  prepump education.  A1c improved to 5.8%.  Concern for overcorrection resulting into hypoglycemia and overcorrection for low blood sugar resulting in to hyperglycemia.  Plan:  Discussed diagnosis, pathophysiology, management and treatment options of condition with pt.  I also discussed importance of strict blood sugar control to prevent complications associated with uncontrolled diabetes.  Continue current regimen.  Continue  Semglee: 15 units/day  Humalog: continue current sliding scale insulin 1 unit 50 > 150.  BF: take 1 unit/ 6 gm of carbs  Lunch: take 1 unit/9 gm of carbs  Dinner: continue 1unit/7 gm of carbs  New  Rx sent for Dexcom G7.  Follow up with CDE for pump education.    Recommend to avoid overcorrection for high blood sugar .    Follow up with Nikky FANG in 4-5 months with labs prior.  Repeat labs and follow up with  in 4-5 month after that.  Please make a lab appointment for blood work and follow up clinic appointment in 1 week after that to discuss results.    2. Hypertension - Under Good  control. Not on medication.    3. Hyperlipidemia - Under Good control. Not on medication. LDL 86  4. Prevention  Ophthalmology-recommend annually  ASA-not indicated secondary to age  Smoking-non-smoker    Foot exam- 6/14/2023      Most Recent Immunizations   Administered Date(s) Administered     COVID-19 12+ (Pfizer) 09/27/2024     COVID-19 Bivalent 12+ (Pfizer) 06/24/2023     COVID-19 Monovalent 18+ (Moderna) 05/18/2021     Comvax (HIB/HepB) 07/11/2001     DTAP (<7y) 06/29/2004     HEPA 03/26/2013     HPV 03/26/2013     Influenza Intranasal Vaccine 09/21/2013     Influenza Vaccine >6 months,quad, PF 10/17/2023     Influenza Vaccine, 6+MO IM (QUADRIVALENT W/PRESERVATIVES) 10/15/2019     Influenza, Split Virus, Trivalent, Pf (Fluzone\Fluarix) 10/17/2024     Influenza,INJ,MDCK,PF,Quad >6mo(Flucelvax) 10/17/2023     MMR 06/29/2004     Meningococcal ACWY (Menactra ) 07/11/2016     Nasal Influenza  Vaccine 2-49 (FluMist) 11/23/2018     Pneumo Conj 13-V (2010&after) 11/14/2019     Pneumococcal (PCV 7) 07/11/2001     Pneumococcal 23 valent 01/16/2020     Poliovirus, inactivated (IPV) 06/29/2004     TDAP (Adacel,Boostrix) 05/24/2022     TDAP Vaccine (Boostrix) 06/27/2011     Typhoid Oral 03/27/2017     Varicella 06/27/2011       Plan: Continuous Glucose  (DEXCOM G7         ) [place], Continuous Glucose Sensor         (DEXCOM G7 SENSOR) MISC, Hemoglobin A1c,         GLUCOSE MONITOR, 72 HOUR, PHYS INTERP, Adult         Diabetes Education  Referral, Insulin         Glargine-yfgn (SEMGLEE, YFGN,) 100 UNIT/ML SOPN       Plan: insulin lispro (HUMALOG KWIKPEN) 100 UNIT/ML (1        unit dial) KWIKPEN          Recommend checking blood sugars before meals and at bedtime.    If Blood glucose are low more often-> 2-3 times/week- give us a call.  The patient is advised to Make better food choices: reduce carbs, Reduce portion size, weight loss and exercise 3-4 times a week.  Discussed hypoglycemia signs and symptoms as well as management in detail.      There is some variability among people, most will usually develop symptoms suggestive of hypoglycemia when blood glucose levels are lowered to the mid 60's. The first set of symptoms are called adrenergic. Patients may experience any of the following nervousness, sweating, intense hunger, trembling, weakness, palpitations, and difficulty speaking. When BS fall below 50 the patient is unable to talk and take oral therapy.  Would recommend Glucagon emergency kit for the patient and education for family and friends around the patient.   The acute management of hypoglycemia involves the rapid delivery of a source of easily absorbed sugar. Regular soda, juice, lifesavers, table sugar, are good options. 15 grams of glucose is the dose that is given, followed by an assessment of symptoms and a blood glucose check if possible. If after 10 minutes there is no  improvement, another 10-15 grams should be given. This can be repeated up to three times. The equivalency of 10-15 grams of glucose (approximate servings) are: Four lifesavers, 4 teaspoons of sugar, or 1/2 cup or 4 oz of juice or regular pop.    Discussed indications, risks and benefits of all medications prescribed, and answered questions to patient's satisfaction.  The longitudinal plan of care for the diagnosis(es)/condition(s) as documented were addressed during this visit. Due to the added complexity in care, I will continue to support Malcolm in the subsequent management and with ongoing continuity of care.  All questions were answered.  The patient indicates understanding of the above issues and agrees with the plan set forth.      Follow-up:  As noted in AVS    Zakia Fox M.D  Endocrinology  [facility]  CC: No Ref-Primary, Physician    Disclaimer: This note consists of symbols derived from keyboarding, dictation and/or voice recognition software. As a result, there may be errors in the script that have gone undetected. Please consider this when interpreting information found in this chart.    Addendum to above note and clinic visit:    Labs reviewed.    See result note/telephone encounter.            Again, thank you for allowing me to participate in the care of your patient.        Sincerely,        Zakia Fox MD

## 2024-12-04 NOTE — PROGRESS NOTES
Endocrinology Clinic Note:  Name: Heidi Madden  Seen for f/u of type 1 Diabetes.  HPI:  Heidi Madden is a 23 year old female who presents for the evaluation/management of type 1 diabetes.  Was seen by pediatric endocrinologist  before.    Using MDI and Dexcom G6.  She may be interested in pump at this time but wants to think about it.    She works at [employer].  She works in shifts.  Living with mother and sister.    Wt Readings from Last 2 Encounters:   24 63.9 kg (140 lb 14.4 oz)   24 62.9 kg (138 lb 11.2 oz)        1. Type 1 DM:  Orginally diagnosed: 3/16/2018  Hospitalization for DKA: no  Current Regimen:   Semglee: 15 units/day at night ( 16-17 units during periods)  Humalo unit/ 6-10 gm of Carbs + sliding scale insulin 1 unit 50 > 150.  BF: 1 unit/6 gm of carbs  Lunch: 1 unit/9-10 gm of carbs  Dinner: 1unit/7-8 gm of carbs  Typically takes about 11-24 TID.  ( mostly estimating)    yes:     Diabetes Medication(s)       Diabetic Other       Glucagon (BAQSIMI TWO PACK) 3 MG/DOSE nasal powder Spray 1 spray (3 mg) in nostril as needed (hypoglycemic siezure or unconsciousness)       Insulin       Insulin Glargine-yfgn (SEMGLEE, YFGN,) 100 UNIT/ML SOPN Inject 18 Units subcutaneously daily.     insulin lispro (HUMALOG KWIKPEN) 100 UNIT/ML (1 unit dial) KWIKPEN INJECT UP TO 70 UNITS DAILY            BS checks: dexcom  Average Meter Download: Blood glucose data reviewed personally. See nursing note from this encounter for details.  Blood sugars are mostly in acceptable range.  Exercise: walks/ bike ride everyday.  Last A1c: 6.3%  Symptoms of hypoglycemia (low blood sugar): yes  Using PUMP: No    DM Complications:   Complications:   Diabetes Complications  Description / Detail    Diabetic Retinopathy  No   CAD / PAD  No   Neuropathy  No   Nephropathy / Microalbuminuria  No  No results found for: UMALCR      Gastroparesis  No   Hypoglycemia [place]  No       2. Hypertension: Blood  Pressure today:   BP Readings from Last 3 Encounters:   12/04/24 118/72   10/09/24 101/69   04/17/24 124/82     3. Hyperlipidemia:  On medication    PMH/PSH:  History reviewed. No pertinent past medical history.  Past Surgical History:   Procedure Laterality Date    ZZC REIMPLANT URETER,VESICOPSOAS HITCH  01/05     Family Hx:  Family History   Problem Relation Age of Onset    Thyroid Disease Mother         hashimoyoto'd    Family History Negative Father     Lipids Father     Psychotic Disorder Father         anger mood swings    Lipids Maternal Grandmother     Hypertension Maternal Grandfather     Diabetes Paternal Grandmother     Thyroid Disease Paternal Grandmother         hypo, partial thyroidectomy    Circulatory Paternal Grandmother         coroid arteries clogged, scraped    Heart Disease Paternal Grandfather         Angioplasty    Alcohol/Drug Paternal Grandfather     Family History Negative Sister     Family History Negative Sister     Lipids Sister         as early teen    Psychotic Disorder Other         bipolar  2 first cousins    Cancer No family hx of         sno cancer. maternal uncle           DM2: aunt, uncle and pgm.           Social Hx:  Social History     Socioeconomic History    Marital status: Single     Spouse name: Not on file    Number of children: Not on file    Years of education: Not on file    Highest education level: Not on file   Occupational History    Not on file   Tobacco Use    Smoking status: Never    Smokeless tobacco: Never    Tobacco comments:     No one in family smokes.   Substance and Sexual Activity    Alcohol use: No    Drug use: No    Sexual activity: Never     Comment: constantine march 2018   Other Topics Concern    Not on file   Social History Narrative    Not on file     Social Drivers of Health     Financial Resource Strain: Low Risk  (2/15/2024)    [facility]    Financial Resource  "Strain     Difficulty of Paying Living Expenses: 3     Difficulty of Paying Living Expenses: Not on file   Food Insecurity: No Food Insecurity (2/15/2024)    Received from [facility]    Food Insecurity     Do you worry your food will run out before you are able to buy more?: 1   Transportation Needs: No Transportation Needs (2/15/2024)    Received from [facility]    Transportation Needs     Does lack of transportation keep you from medical appointments?: 1     Does lack of transportation keep you from work, meetings or getting things that you need?: 1   Physical Activity: Not on file   Stress: Not on file   Social Connections: Socially Integrated (2/15/2024)    [facility]    Social Connections     Do you often feel lonely or isolated from those around you?: 0   Interpersonal Safety: Not on file   Housing Stability: Low Risk  (2/15/2024)    [facility]    Housing Stability     What is your housing situation today?: 1          MEDICATIONS:  has a current medication list which includes the following prescription(s): reason aspirin not prescribed (intentional), dexcom g6 , dexcom g6 sensor, dexcom g6 transmitter, baqsimi two pack, insulin glargine-yfgn, insulin lispro, bd pen needle lynda 2nd gen, ketoconazole, onetouch delica lancets 33g, onetouch verio iq, sertraline, statin not prescribed, and urine glucose-ketones test.    ROS     ROS: 10 point ROS neg other than the symptoms noted above in the HPI.    Physical Exam   VS: /72 (BP Location: Left arm, Patient Position: Chair, Cuff Size: Adult Regular)   Pulse 70   Temp 98.1  F (36.7  C) (Tympanic)   Resp 16   Ht 1.651 m (5' 5\")   Wt 63.9 kg (140 lb 14.4 oz)   LMP 11/04/2024 (Approximate)   SpO2 100%   Breastfeeding No   BMI 23.45 kg/m    GENERAL: healthy, alert and no distress  EYES: " Eyes grossly normal to inspection, conjunctivae and sclerae normal  ENT: no nose swelling, nasal discharge.  Thyroid: no apparent thyroid nodules.  Thyroid appears normal in size and nontender.  CV: RRR, no rubs, gallops, no murmurs  RESP: CTAB, no wheezes, rales, or ronchi  ABDO: +BS  EXTREMITIES: no hand tremors.  NEURO: Cranial nerves grossly intact, mentation intact and speech normal  SKIN: No apparent skin lesions, rash or edema seen   PSYCH: mentation appears normal, affect normal/bright, judgement and insight intact, normal speech and appearance well-groomed      LABS:  A1c:  Lab Results   Component Value Date    A1C 5.8 11/27/2024    A1C 6.3 04/10/2024    A1C 6.4 10/11/2023    A1C 6.3 06/14/2023    A1C 6.5 01/04/2023    A1C 5.5 12/10/2019    A1C 6.1 09/10/2019    A1C 6.1 06/18/2019    A1C 5.7 02/19/2019    A1C 6.0 11/20/2018     BMP:  Creatinine   Date Value Ref Range Status   11/27/2024 0.67 0.51 - 1.17 mg/dL Final     Comment:     Male and Female  0-2 Months    0.31-0.88 mg/dL  2-12 Months   0.16-0.39 mg/dL  1-2 Years     0.18-0.35 mg/dL  3-4 Years     0.26-0.42 mg/dL  5-6 Years     0.29-0.47 mg/dL  7-8 Years     0.34-0.53 mg/dL  9-10 Years    0.33-0.64 mg/dL  11-12 Years   0.44-0.68 mg/dL  13-14 Years   0.46-0.77 mg/dL    Female  15 Years and older  0.51-0.95 mg/dL    Male  15 Years and older  0.67-1.17 mg/dL       03/16/2018 0.56 0.50 - 1.00 mg/dL Final     Urine Micro:  Lab Results   Component Value Date    UMALCR  11/27/2024      Comment:      Unable to calculate, urine albumin and/or urine creatinine is outside detectable limits.  Microalbuminuria is defined as an albumin:creatinine ratio of 17 to 299 for males and 25 to 299 for females. A ratio of albumin:creatinine of 300 or higher is indicative of overt proteinuria.  Due to biologic variability, positive results should be confirmed by a second, first-morning random or 24-hour timed urine specimen. If there is discrepancy, a third specimen is  recommended. When 2 out of 3 results are in the microalbuminuria range, this is evidence for incipient nephropathy and warrants increased efforts at glucose control, blood pressure control, and institution of therapy with an angiotensin-converting-enzyme (ACE) inhibitor (if the patient can tolerate it).      UMALCR  05/25/2022      Comment:      Unable to calculate:  Urine creatinine or albumin value below detectable level       LFTs/Lipids:  Recent Labs   Lab Test 01/04/23  0818 03/15/19  0909   CHOL 168 176*   HDL 73 55   LDL 86 109   TRIG 47 58       TFTs:  TSH   Date Value Ref Range Status   11/27/2024 2.54 0.30 - 4.20 uIU/mL Final   05/25/2022 1.82 0.40 - 4.00 mU/L Final   11/23/2018 3.52 0.40 - 4.00 mU/L Final       Blood Glucose and pump data/ Meter reviewed.     All pertinent notes, labs, and images personally reviewed by me.       Glucometer/ insulin pump (if applicable)/ CGM data (if applicable) downloaded, Personally reviewed and interpreted.  All Blood sugar data reviewed personally and interpreted as well as discussed with pt.  All past medical, social and Family history reviewed and updated in Epic.    A/P  Ms.Abby TYESHA Madden is a 23 year old here for the evaluation/management of diabetes:    1. DM1 - Under Fair control.  A1c 5.8 %. Average blood sugar is 163 for the last 15 days but does not match with A1c.  Noted variable blood sugar data on a day to day basis.  No clear pattern is identified.  No known diabetes related complications.  She is using multiple injections plus dexcom.  She is maybe interested in insulin pump at this time and would like to think about it.  She is agreeable to be seen by diabetic educator for more prepump education.  A1c improved to 5.8%.  Concern for overcorrection resulting into hypoglycemia and overcorrection for low blood sugar resulting in to hyperglycemia.  Plan:  Discussed diagnosis, pathophysiology, management and treatment options of condition with pt.  I also  discussed importance of strict blood sugar control to prevent complications associated with uncontrolled diabetes.  Continue current regimen.  Continue  Semglee: 15 units/day  Humalog: continue current sliding scale insulin 1 unit 50 > 150.  BF: take 1 unit/ 6 gm of carbs  Lunch: take 1 unit/9 gm of carbs  Dinner: continue 1unit/7 gm of carbs  New  Rx sent for Dexcom G7.  Follow up with CDE for pump education.    Recommend to avoid overcorrection for high blood sugar .    Follow up with Nikky FANG in 4-5 months with labs prior.  Repeat labs and follow up with  in 4-5 month after that.  Please make a lab appointment for blood work and follow up clinic appointment in 1 week after that to discuss results.    2. Hypertension - Under Good  control. Not on medication.    3. Hyperlipidemia - Under Good control. Not on medication. LDL 86  4. Prevention  Ophthalmology-recommend annually  ASA-not indicated secondary to age  Smoking-non-smoker    Foot exam- 6/14/2023      Most Recent Immunizations   Administered Date(s) Administered    COVID-19 12+ (Pfizer) 09/27/2024    COVID-19 Bivalent 12+ (Pfizer) 06/24/2023    COVID-19 Monovalent 18+ (Moderna) 05/18/2021    Comvax (HIB/HepB) 07/11/2001    DTAP (<7y) 06/29/2004    HEPA 03/26/2013    HPV 03/26/2013    Influenza Intranasal Vaccine 09/21/2013    Influenza Vaccine >6 months,quad, PF 10/17/2023    Influenza Vaccine, 6+MO IM (QUADRIVALENT W/PRESERVATIVES) 10/15/2019    Influenza, Split Virus, Trivalent, Pf (Fluzone\Fluarix) 10/17/2024    Influenza,INJ,MDCK,PF,Quad >6mo(Flucelvax) 10/17/2023    MMR 06/29/2004    Meningococcal ACWY (Menactra ) 07/11/2016    Nasal Influenza Vaccine 2-49 (FluMist) 11/23/2018    Pneumo Conj 13-V (2010&after) 11/14/2019    Pneumococcal (PCV 7) 07/11/2001    Pneumococcal 23 valent 01/16/2020    Poliovirus, inactivated (IPV) 06/29/2004    TDAP (Adacel,Boostrix) 05/24/2022    TDAP Vaccine (Boostrix) 06/27/2011    Typhoid Oral 03/27/2017     Varicella 06/27/2011       Plan: Continuous Glucose  (DEXCOM G7         ) [place], Continuous Glucose Sensor         (DEXCOM G7 SENSOR) MISC, Hemoglobin A1c,         GLUCOSE MONITOR, 72 HOUR, PHYS INTERP, Adult         Diabetes Education [facility] Referral, Insulin         Glargine-yfgn (SEMGLEE, YFGN,) 100 UNIT/ML SOPN       Plan: insulin lispro (HUMALOG KWIKPEN) 100 UNIT/ML (1        unit dial) KWIKPEN          Recommend checking blood sugars before meals and at bedtime.    If Blood glucose are low more often-> 2-3 times/week- give us a call.  The patient is advised to Make better food choices: reduce carbs, Reduce portion size, weight loss and exercise 3-4 times a week.  Discussed hypoglycemia signs and symptoms as well as management in detail.      There is some variability among people, most will usually develop symptoms suggestive of hypoglycemia when blood glucose levels are lowered to the mid 60's. The first set of symptoms are called adrenergic. Patients may experience any of the following nervousness, sweating, intense hunger, trembling, weakness, palpitations, and difficulty speaking. When BS fall below 50 the patient is unable to talk and take oral therapy.  Would recommend Glucagon emergency kit for the patient and education for family and friends around the patient.   The acute management of hypoglycemia involves the rapid delivery of a source of easily absorbed sugar. Regular soda, juice, lifesavers, table sugar, are good options. 15 grams of glucose is the dose that is given, followed by an assessment of symptoms and a blood glucose check if possible. If after 10 minutes there is no improvement, another 10-15 grams should be given. This can be repeated up to three times. The equivalency of 10-15 grams of glucose (approximate servings) are: Four lifesavers, 4 teaspoons of sugar, or 1/2 cup or 4 oz of juice or regular pop.    Discussed indications, risks and benefits of all medications  prescribed, and answered questions to patient's satisfaction.  The longitudinal plan of care for the diagnosis(es)/condition(s) as documented were addressed during this visit. Due to the added complexity in care, I will continue to support Malcolm in the subsequent management and with ongoing continuity of care.  All questions were answered.  The patient indicates understanding of the above issues and agrees with the plan set forth.      Follow-up:  As noted in AVS    Zakia Fox M.D  Endocrinology  [facility]/[place]  CC: No Ref-Primary, Physician    Disclaimer: This note consists of symbols derived from keyboarding, dictation and/or voice recognition software. As a result, there may be errors in the script that have gone undetected. Please consider this when interpreting information found in this chart.    Addendum to above note and clinic visit:    Labs reviewed.    See result note/telephone encounter.

## 2024-12-04 NOTE — PATIENT INSTRUCTIONS
[facility]  Dr Fox, Endocrinology Department    [facility]   [address] [facility]  [place] [address]  Appointment [phone]  Fax: [phone]  [place]: Monday - Thursday      Please check the cost coverage and copay with insurance before recommended tests, services and medications (especially if new medications are prescribed).     If ordered, please get blood work done 1 week prior to your next appointment so they will be available to Dr. Fox at your visit.    To provide the best diabetic care, please bring your blood glucose meter to each and every visit with your  Endocrinologist. Your blood glucose CGM/meter/insulin pump will be downloaded at every appointment.  Please arrive 15 minutes before your scheduled appointment. This will allow for your blood glucose CGM/meter/insulin pump  to be downloaded.  If you are wearing DEXCOM please bring  or sharing code from the Dexcom Clarity Gonzalez so that it can be downloaded.  If you are using Lottay personal sensors please bring the reader.    Continue current regimen.  Continue  Semglee: 15 units/day  Humalog: continue current sliding scale insulin 1 unit 50 > 150.  BF: take 1 unit/ 6 gm of carbs  Lunch: take 1 unit/9 gm of carbs  Dinner: continue 1unit/7 gm of carbs  New  Rx sent for Dexcom G7.  Follow up with CDE for pump education.    Follow up with Nikky FAGN in 4-5 months with labs prior.  Repeat labs and follow up with  in 4-5 month after that.  Please make a lab appointment for blood work and follow up clinic appointment in 1 week after that to discuss results.    Your provider has referred you to Diabetes Education: For all [facility]:  Phone [phone]; Fax [phone]  Please call and make the appointment.    Noted variable blood sugar pattern.  Noted pattern of over bolusing resulting into hypoglycemia and then over correcting resulting into  hyperglycemia.  Recommend not to overcorrect with high dose of insulin for high blood sugar.  Do not take too much glucose/carbohydrate for correcting low blood sugar.  Continue to monitor blood sugar closely.    Recommend checking blood sugars before meals and at bedtime.    If Blood glucose are low more often-> 2-3 times/week- give us a call.  Make better food choices: reduce carbs, Reduce portion size, weight loss and exercise 3-4 times a week.    What is hypoglycemia:  Hypoglycemia is when blood sugar levels become too low - below 70 m/dl.      What causes hypoglycemia?  - using too much insulin  -taking too many diabetes pills  -not eating enough, or skipping meals or snacks  -not eating enough carbohydrate with meals  -changing your exercise routine  -drinking alcohol in excess    It is also possible to have hypoglycemia even when you are carefully managing your blood sugar levels.    What does it feel like when blood sugars get too low?  You may feel:  - anxious  -confused  -dizzy  -hungry  -light-headed  -nervous  -shaky  -sleepy  -sweaty    You may have  -blurred or cloudy vision  -heart palpitations (heart skips a beat or races)  -tingling or numbness around the mouth and tongue  -tremors    What to do if you have symptoms of hypoglycmemia:  If you think your blood sugar is too low, check it with a glucose meter.  If its below 70 mg/dl, consume one of the following:  Fruit juice (1/2 cup)  Glucose tablets (15 grams)  Hard candy (5 to 7 pieces)  Honey or sugar (2 teaspoons)  Milk (1/2 cup)  Soft drink (non-diet, 1/2 cup)    Wait 15 minutes and check your blood glucose again.  IF it is still below 70 mg/dl, have another food item listed above. Wait another 15 minutes and repeat the blood glucose test.  Have a small meal or snack that contains some carbohydrate after your blood glucose rises above 70 mg/dl.    If you are at risk of hypoglycemia, always carry with you glucose tablets or one of the foods listed  above.      To prevent Hypoglycemia:  Avoid situations that may cause hypoglycemia  Before making any change to your diet or exercise routine, discuss them with your healthcare provider  Keep a record of your blood glucose levels.  Include the time of day, diabetes medications, when you had your last meal or snack, and what you were doing at the time (e.g. Watching TV, gardening, jogging, etc).    Talk to your healthcare provider if your blood glucose levels are often low        Patient guide on hypoglycemia    http://www.hormone.org/Resources/upload/patient-guide-diagnosis-and-management-hypoglycemia-[email or web address].[email or web address]

## 2024-12-29 ENCOUNTER — HEALTH MAINTENANCE LETTER (OUTPATIENT)
Age: 24
End: 2024-12-29

## 2025-01-09 ENCOUNTER — OFFICE VISIT (OUTPATIENT)
Dept: EDUCATION SERVICES | Facility: CLINIC | Age: 25
End: 2025-01-09
Attending: INTERNAL MEDICINE
Payer: COMMERCIAL

## 2025-01-09 DIAGNOSIS — E10.649 TYPE 1 DIABETES MELLITUS WITH HYPOGLYCEMIA AND WITHOUT COMA (H): ICD-10-CM

## 2025-01-09 NOTE — PATIENT INSTRUCTIONS
PLAN:  SYMGLEE - INCREASE TO 16-17 UNITS    HUMALOG- CONTINUE CURRENT CARBOHYDRATE RATIO AND CORRECTION, DO NOT ADD EXTRA CORRECTION IN THE EVENING/BEDTIME, ONLY TAKE THE 1:50 >150    IF CONSUMING ALCOHOL, MAKE SURE TO HAVE A SNACK OF 15-30 GRAMS OF CARBOHYDRATE BEFORE BED AND DO NOT TAKE HUMALOG FOR IT.     INSULIN PUMP:   Following Insulin Pump 101, if you are ready to move to an insulin pump, please schedule a second appointment with a [place] Diabetes Educator to review more in-depth insulin pump information and prepare paperwork and other needed prescriptions.    Needed appointments for any patient starting pump therapy:  Insulin pump 101 (completed today)  Pre- Pump Diabetes Education Visit  Pump training appointment with pump  (will need to do pre-work on your own prior to this appt)  Post-Pump training appt with Diabetes Educator 2 to 4 weeks after pump training.  Additional follow up appts as recommended by your Diabetes Educator, if needed.  Continue same routine follow ups with your diabetes provider.      If you are not yet ready to move to pump therapy and you want to do some more investigation/research:  Discuss pump therapy with your insurance company to see what your costs will be.  Understand your coverage (deductible, co- insurance, maximum out of pocket costs) and any additional information that may be needed for coverage.  Research your pump options  A. Company websites  https://www.KonTEM/  B.  Pump comparison websites   I.   https://www.pantherprogram.org/device-comparison   II.  https://www.adces.org/danatech/insulin-pumps  C. Simulator Apps/Websites   I. Omnipod Simulator Gonzalez (download from gonzalez store): Omnipod           3.  If you decide to move forward with pump therapy, please schedule a second appointment with a [place] Diabetes Educator to review more in-depth insulin pump information and prepare paperwork and other needed prescriptions.    Follow up on Wednesday January  29th at 10:30am    [place] [facility] for the [facility] Area:  For Your Diabetes Education and Nutrition Appointments Call:  [phone]   For Diabetes Education or Nutrition Related Questions:   Phone: [phone]  Send [employer] Message   If you need a medication refill please contact your pharmacy. Please allow 3 business days for your refills to be completed.

## 2025-01-09 NOTE — LETTER
1/9/2025         RE: Heidi Madden  [address] [place] [address]        Dear Colleague,    Thank you for referring your patient, Heidi Madden, to the [facility]. Please see a copy of my visit note below.      Diabetes Self-Management Education & Support    Presents for: Insulin Pump Pre-Start    Type of Service: In Person Visit    Assessment  Patient is here for prepump 101 education, she has been considering a pump for awhile, she has diabetes burnout and is ready for a change. She thinks she is interested in the Omnipod tubeless pump, but doesn't know much about the other pumps.  She reports consistently taking her insulin every day, and times the Humalog 20 minutes before meals most of the time.   She is taking 15 units of semglee at bedtime and follow I:C ratio and correction as follow:   Humalog: carbohydrate ratio  Breakfast: 1:6 (approx 11-12 units/meal on weekday and 20 units on weekend)  Lunch: 1:9  (approx 12-16 units per meal)  Dinner: 1:7  (approx 12-16 units per meal)    Correction 1:50 > 150, she also adds additional 1-3 units of correction units in additiona to the 1:50 at bedtime due to high glucose      Insulin Pump 101 concepts Reviewed today:  -General pump info  -basal vs bolus, tubed vs tubeless, wear 24/7, refill every 3 days, use of phone if needed, where a pump can be worn.  -Benefits of pump therapy  -Common barriers  -Types of pumps available and pros/cons of each  -Sensor compatibility  -Automated insulin delivery concepts  -Further references/information  -Need for back up plan, emergency kit, warranty, cost, additional requirements for government insurance  -Expectations for new pump users (good communication, following of protocols, multiple appts)  -Next steps     Care Plan and Education Provided:  Pump Hardware & Software:   -- Automated insulin delivery functions/features  Problem Solving:  -- DKA prevention & steps to take when glucose is  above 240 mg/dL   Continuous Glucose Monitoring:  -- CGM Alerts & Alarms     Patient verbalized understanding of diabetes self-management education concepts discussed, opportunities for ongoing education and support, and recommendations provided today.    Plan  SYMGLEE - INCREASE TO 16-17 UNITS    HUMALOG- CONTINUE CURRENT CARBOHYDRATE RATIO AND CORRECTION, DO NOT ADD EXTRA CORRECTION IN THE EVENING/BEDTIME, ONLY TAKE THE 1:50 >150    IF CONSUMING ALCOHOL, MAKE SURE TO HAVE A SNACK OF 15-30 GRAMS OF CARBOHYDRATE BEFORE BED AND DO NOT TAKE HUMALOG FOR IT.     INSULIN PUMP:   Following Insulin Pump 101, if you are ready to move to an insulin pump, please schedule a second appointment with a [facility] Diabetes Educator to review more in-depth insulin pump information and prepare paperwork and other needed prescriptions.    Needed appointments for any patient starting pump therapy:  Insulin pump 101 (completed today)  Pre- Pump Diabetes Education Visit  Pump training appointment with pump  (will need to do pre-work on your own prior to this appt)  Post-Pump training appt with Diabetes Educator 2 to 4 weeks after pump training.  Additional follow up appts as recommended by your Diabetes Educator, if needed.  Continue same routine follow ups with your diabetes provider.      If you are not yet ready to move to pump therapy and you want to do some more investigation/research:  Discuss pump therapy with your insurance company to see what your costs will be.  Understand your coverage (deductible, co- insurance, maximum out of pocket costs) and any additional information that may be needed for coverage.  Research your pump options  A. Company websites  https://www.[employer]/  B.  Pump comparison websites   I.   https://www.pantherprogram.org/device-comparison   II.  https://www.adces.org/danatech/insulin-pumps  C. Simulator Apps/Websites   I. Omnipod Simulator [employer] (download from gonzalez store): Omnipod           3.  If  you decide to move forward with pump therapy, please schedule a second appointment with a [place] Diabetes Educator to review more in-depth insulin pump information and prepare paperwork and other needed prescriptions.    Follow up on Wednesday January 29th at 10:30am  Topics to cover at upcoming visits: Insulin Pump Concepts -- Pump hardware - tubed vs pod/patch, infusion sets, cannulas, reservoirs & cartridges   -- Pump software - automated insulin delivery systems, features of different AID algorithms   -- Balancing glucose and insulin - carbohydrate counting, bolus calculator, ICR, ISF, timing of insulin delivery, troubleshooting missed boluses   -- Problem solving with insulin pump therapy - risk of pump failure, MDI back up plan, risk of DKA, keeping adequate supplies on hand, risk of hypoglycemia, exiting and re-entering automated delivery modes   -- Hands on practice with insulin pump simulator apps      Follow-up:  Upcoming Diabetes Ed Appointments     Visit Type Date Time Department    INSULIN PRE-PUMP 1/9/2025  2:30 PM [facility] ED    DIABETES ED 1/29/2025 10:30 AM [facility] ED        See Care Plan for co-developed, patient-state behavior change goals.    Education Materials Provided:  - About Insulin Pumps      Subjective/Objective  Malcolm is an 24 year old year old, presenting for the following diabetes education related to: Presents for: Insulin Pump Pre-Start  Accompanied by: Self  Diabetes education in the past 24mo: No  Focus of Visit: Insulin Pump  Type of Pump visit: Pre-pump  Diabetes type: Type 1  Disease course: Stable  How confident are you filling out medical forms by yourself:: Extremely  Transportation concerns: No  Difficulty affording diabetes medication?: No  Difficulty affording diabetes testing supplies?: No  Other concerns:: None  Cultural Influences/Ethnic Background:  Not  or       Diabetes Symptoms & Complications:  Diabetes Related Symptoms: None  Weight trend:  "Stable  Symptom course: Stable  Disease course: Stable  Complications assessed today?: No    Patient Problem List and Family Medical History reviewed for relevant medical history, current medical status, and diabetes risk factors.    Vitals:  LMP 11/04/2024 (Approximate)   Estimated body mass index is 23.45 kg/m  as calculated from the following:    Height as of 12/4/24: 1.651 m (5' 5\").    Weight as of 12/4/24: 63.9 kg (140 lb 14.4 oz).   Last 3 BP:   BP Readings from Last 3 Encounters:   12/04/24 118/72   10/09/24 101/69   04/17/24 124/82       History   Smoking Status     Never   Smokeless Tobacco     Never     Comment: No one in family smokes.       Labs:  Lab Results   Component Value Date    A1C 5.8 11/27/2024    A1C 5.5 12/10/2019     Lab Results   Component Value Date     03/16/2018     03/16/2018     Lab Results   Component Value Date    LDL 86 01/04/2023     03/15/2019     HDL Cholesterol   Date Value Ref Range Status   03/15/2019 55 >45 mg/dL Final     Direct Measure HDL   Date Value Ref Range Status   01/04/2023 73 >=50 mg/dL Final   ]  GFR Estimate   Date Value Ref Range Status   11/27/2024 >90 >60 mL/min/1.73m2 Final     Comment:     The generation of the estimated GFR is currently based on binary male or female sex. If the electronic health record information indicates another gender identity or if Legal Sex is recorded as \"Unknown\", GFR estimates are not automatically calculated, and application of GFR equations or a direct GFR measurement should be considered according to the individual's appropriate clinical context.  eGFR calculated using 2021 CKD-EPI equation.   03/16/2018 >90 >60 mL/min/1.7m2 Final     Comment:     Non  GFR Calc     GFR Estimate If Black   Date Value Ref Range Status   03/16/2018 >90 >60 mL/min/1.7m2 Final     Comment:      GFR Calc     Lab Results   Component Value Date    CR 0.67 11/27/2024    CR 0.56 03/16/2018     No " "results found for: \"MICROALBUMIN\"    Healthy Eating:  Healthy Eating Assessed Today: Yes  Cultural/Latter-day diet restrictions?: No  How many times a week on average do you eat food made away from home (restaurant/take-out)?: 0  Meals include: Breakfast, Lunch, Dinner, Evening Snack  Breakfast: week day- oatmeal,apple sauce/yogurt/fruit, lunch meat/hot dog Or weekend- brunch:scrambled eggs, fruit, donut/waffle/toast  Lunch: sandwich,fruit, granola bar, coffee with creamer OR  weekend- skip  Dinner: protein, vegetable, grain  Snacks: popcorn  Beverages: Water, Coffee, Diet soda    Being Active:  Being Active Assessed Today: Yes  Exercise:: Currently not exercising  Barrier to exercise: Other    Monitoring:  Monitoring Assessed Today: Yes  Did patient bring glucose meter to appointment? : Yes  Blood Glucose Meter: Accu-chek, CGM  Times checking blood sugar at home (number): 5+  Times checking blood sugar at home (per): Day          Taking Medications:  Diabetes Medication(s)       Diabetic Other       Glucagon (BAQSIMI TWO PACK) 3 MG/DOSE nasal powder Spray 1 spray (3 mg) in nostril as needed (hypoglycemic siezure or unconsciousness)       Insulin       insulin degludec (TRESIBA FLEXTOUCH) 100 UNIT/ML pen Inject 15 Units subcutaneously daily.     Insulin Glargine-yfgn (SEMGLEE, YFGN,) 100 UNIT/ML SOPN Inject 15 Units subcutaneously daily.     insulin lispro (HUMALOG KWIKPEN) 100 UNIT/ML (1 unit dial) KWIKPEN INJECT UP TO 60 UNITS DAILY          Taking Medication Assessed Today: Yes  Current Treatments: Insulin Injections  Dose schedule: Pre-breakfast, Pre-lunch, Pre-dinner  Given by: Patient  Injection/Infusion sites: Abdomen, Arms, Buttocks, Thighs  Problems taking diabetes medications regularly?: Yes  Diabetes medication side effects?: No    Problem Solving:  Problem Solving Assessed Today: Yes  Is the patient at risk for hypoglycemia?: Yes  Hypoglycemia Frequency: Weekly  Hypoglycemia Treatment: Other food, Candy, " Juice  Medical ID: No  Does patient have glucagon emergency kit?: Yes  Is the patient at risk for DKA?: Yes  Does patient have ketone test strips?: Yes  Does patient have DKA prevention plan?: Yes  Hypoglycemia: Confusion, Dizziness/Lightheadedness, Sweats, None, Nervousness/Anxiety (tongue- numb)  Hypoglycemia Complications: Nocturnal hypoglycemia  Reducing Risks:  Reducing Risks Assessed Today: No  Has dilated eye exam at least once a year?: Yes  Sees dentist every 6 months?: Yes  Feet checked by healthcare provider in the last year?: Yes    Healthy Coping:  Healthy Coping Assessed Today: Yes  Emotional response to diabetes: Ready to learn  Informal Support system:: Family, Friends, Other  Stage of change: ACTION (Actively working towards change)    Milena Grey RN  Time Spent: 75 minutes  Encounter Type: Individual    Any diabetes medication dose changes were made via the CDCES Standing Orders under the patient's referring provider.

## 2025-01-09 NOTE — PROGRESS NOTES
Diabetes Self-Management Education & Support    Presents for: Insulin Pump Pre-Start    Type of Service: In Person Visit    Assessment  Patient is here for prepump 101 education, she has been considering a pump for awhile, she has diabetes burnout and is ready for a change. She thinks she is interested in the Omnipod tubeless pump, but doesn't know much about the other pumps.  She reports consistently taking her insulin every day, and times the Humalog 20 minutes before meals most of the time.   She is taking 15 units of semglee at bedtime and follow I:C ratio and correction as follow:   Humalog: carbohydrate ratio  Breakfast: 1:6 (approx 11-12 units/meal on weekday and 20 units on weekend)  Lunch: 1:9  (approx 12-16 units per meal)  Dinner: 1:7  (approx 12-16 units per meal)    Correction 1:50 > 150, she also adds additional 1-3 units of correction units in additiona to the 1:50 at bedtime due to high glucose      Insulin Pump 101 concepts Reviewed today:  -General pump info  -basal vs bolus, tubed vs tubeless, wear 24/7, refill every 3 days, use of phone if needed, where a pump can be worn.  -Benefits of pump therapy  -Common barriers  -Types of pumps available and pros/cons of each  -Sensor compatibility  -Automated insulin delivery concepts  -Further references/information  -Need for back up plan, emergency kit, warranty, cost, additional requirements for government insurance  -Expectations for new pump users (good communication, following of protocols, multiple appts)  -Next steps     Care Plan and Education Provided:  Pump Hardware & Software:   -- Automated insulin delivery functions/features  Problem Solving:  -- DKA prevention & steps to take when glucose is above 240 mg/dL   Continuous Glucose Monitoring:  -- CGM Alerts & Alarms     Patient verbalized understanding of diabetes self-management education concepts discussed, opportunities for ongoing education and support, and recommendations provided  today.    Plan  SYMGLEE - INCREASE TO 16-17 UNITS    HUMALOG- CONTINUE CURRENT CARBOHYDRATE RATIO AND CORRECTION, DO NOT ADD EXTRA CORRECTION IN THE EVENING/BEDTIME, ONLY TAKE THE 1:50 >150    IF CONSUMING ALCOHOL, MAKE SURE TO HAVE A SNACK OF 15-30 GRAMS OF CARBOHYDRATE BEFORE BED AND DO NOT TAKE HUMALOG FOR IT.     INSULIN PUMP:   Following Insulin Pump 101, if you are ready to move to an insulin pump, please schedule a second appointment with a [facility] Diabetes Educator to review more in-depth insulin pump information and prepare paperwork and other needed prescriptions.    Needed appointments for any patient starting pump therapy:  Insulin pump 101 (completed today)  Pre- Pump Diabetes Education Visit  Pump training appointment with pump  (will need to do pre-work on your own prior to this appt)  Post-Pump training appt with Diabetes Educator 2 to 4 weeks after pump training.  Additional follow up appts as recommended by your Diabetes Educator, if needed.  Continue same routine follow ups with your diabetes provider.      If you are not yet ready to move to pump therapy and you want to do some more investigation/research:  Discuss pump therapy with your insurance company to see what your costs will be.  Understand your coverage (deductible, co- insurance, maximum out of pocket costs) and any additional information that may be needed for coverage.  Research your pump options  A. Company websites  https://www.[employer]/  B.  Pump comparison websites   I.   https://www.pantherprogram.org/device-comparison   II.  https://www.adces.org/danatech/insulin-pumps  C. Simulator Apps/Websites   I. Omnipod Simulator [employer] (download from gonzalez store): Omnipod           3.  If you decide to move forward with pump therapy, please schedule a second appointment with a [facility] Diabetes Educator to review more in-depth insulin pump information and prepare paperwork and other needed prescriptions.    Follow up on Wednesday  January 29th at 10:30am  Topics to cover at upcoming visits: Insulin Pump Concepts -- Pump hardware - tubed vs pod/patch, infusion sets, cannulas, reservoirs & cartridges   -- Pump software - automated insulin delivery systems, features of different AID algorithms   -- Balancing glucose and insulin - carbohydrate counting, bolus calculator, ICR, ISF, timing of insulin delivery, troubleshooting missed boluses   -- Problem solving with insulin pump therapy - risk of pump failure, MDI back up plan, risk of DKA, keeping adequate supplies on hand, risk of hypoglycemia, exiting and re-entering automated delivery modes   -- Hands on practice with insulin pump simulator apps      Follow-up:  Upcoming Diabetes Ed Appointments     Visit Type Date Time Department    INSULIN PRE-PUMP 1/9/2025  2:30 PM [facility] DIABETES ED    DIABETES ED 1/29/2025 10:30 AM [facility] DIABETES ED        See Care Plan for co-developed, patient-state behavior change goals.    Education Materials Provided:  - About Insulin Pumps      Subjective/Objective  Malcolm is an 24 year old year old, presenting for the following diabetes education related to: Presents for: Insulin Pump Pre-Start  Accompanied by: Self  Diabetes education in the past 24mo: No  Focus of Visit: Insulin Pump  Type of Pump visit: Pre-pump  Diabetes type: Type 1  Disease course: Stable  How confident are you filling out medical forms by yourself:: Extremely  Transportation concerns: No  Difficulty affording diabetes medication?: No  Difficulty affording diabetes testing supplies?: No  Other concerns:: None  Cultural Influences/Ethnic Background:  Not  or       Diabetes Symptoms & Complications:  Diabetes Related Symptoms: None  Weight trend: Stable  Symptom course: Stable  Disease course: Stable  Complications assessed today?: No    Patient Problem List and Family Medical History reviewed for relevant medical history, current medical status, and diabetes risk factors.    Vitals:  LMP  "11/04/2024 (Approximate)   Estimated body mass index is 23.45 kg/m  as calculated from the following:    Height as of 12/4/24: 1.651 m (5' 5\").    Weight as of 12/4/24: 63.9 kg (140 lb 14.4 oz).   Last 3 BP:   BP Readings from Last 3 Encounters:   12/04/24 118/72   10/09/24 101/69   04/17/24 124/82       History   Smoking Status    Never   Smokeless Tobacco    Never     Comment: No one in family smokes.       Labs:  Lab Results   Component Value Date    A1C 5.8 11/27/2024    A1C 5.5 12/10/2019     Lab Results   Component Value Date     03/16/2018     03/16/2018     Lab Results   Component Value Date    LDL 86 01/04/2023     03/15/2019     HDL Cholesterol   Date Value Ref Range Status   03/15/2019 55 >45 mg/dL Final     Direct Measure HDL   Date Value Ref Range Status   01/04/2023 73 >=50 mg/dL Final   ]  GFR Estimate   Date Value Ref Range Status   11/27/2024 >90 >60 mL/min/1.73m2 Final     Comment:     The generation of the estimated GFR is currently based on binary male or female sex. If the electronic health record information indicates another gender identity or if Legal Sex is recorded as \"Unknown\", GFR estimates are not automatically calculated, and application of GFR equations or a direct GFR measurement should be considered according to the individual's appropriate clinical context.  eGFR calculated using 2021 CKD-EPI equation.   03/16/2018 >90 >60 mL/min/1.7m2 Final     Comment:     Non  GFR Calc     GFR Estimate If Black   Date Value Ref Range Status   03/16/2018 >90 >60 mL/min/1.7m2 Final     Comment:      GFR Calc     Lab Results   Component Value Date    CR 0.67 11/27/2024    CR 0.56 03/16/2018     No results found for: \"MICROALBUMIN\"    Healthy Eating:  Healthy Eating Assessed Today: Yes  Cultural/Hindu diet restrictions?: No  How many times a week on average do you eat food made away from home (restaurant/take-out)?: 0  Meals include: Breakfast, " Lunch, Dinner, Evening Snack  Breakfast: week day- oatmeal,apple sauce/yogurt/fruit, lunch meat/hot dog Or weekend- brunch:scrambled eggs, fruit, donut/waffle/toast  Lunch: sandwich,fruit, granola bar, coffee with creamer OR  weekend- skip  Dinner: protein, vegetable, grain  Snacks: popcorn  Beverages: Water, Coffee, Diet soda    Being Active:  Being Active Assessed Today: Yes  Exercise:: Currently not exercising  Barrier to exercise: Other    Monitoring:  Monitoring Assessed Today: Yes  Did patient bring glucose meter to appointment? : Yes  Blood Glucose Meter: Accu-chek, CGM  Times checking blood sugar at home (number): 5+  Times checking blood sugar at home (per): Day          Taking Medications:  Diabetes Medication(s)       Diabetic Other       Glucagon (BAQSIMI TWO PACK) 3 MG/DOSE nasal powder Spray 1 spray (3 mg) in nostril as needed (hypoglycemic siezure or unconsciousness)       Insulin       insulin degludec (TRESIBA FLEXTOUCH) 100 UNIT/ML pen Inject 15 Units subcutaneously daily.     Insulin Glargine-yfgn (SEMGLEE, YFGN,) 100 UNIT/ML SOPN Inject 15 Units subcutaneously daily.     insulin lispro (HUMALOG KWIKPEN) 100 UNIT/ML (1 unit dial) KWIKPEN INJECT UP TO 60 UNITS DAILY          Taking Medication Assessed Today: Yes  Current Treatments: Insulin Injections  Dose schedule: Pre-breakfast, Pre-lunch, Pre-dinner  Given by: Patient  Injection/Infusion sites: Abdomen, Arms, Buttocks, Thighs  Problems taking diabetes medications regularly?: Yes  Diabetes medication side effects?: No    Problem Solving:  Problem Solving Assessed Today: Yes  Is the patient at risk for hypoglycemia?: Yes  Hypoglycemia Frequency: Weekly  Hypoglycemia Treatment: Other food, Candy, Juice  Medical ID: No  Does patient have glucagon emergency kit?: Yes  Is the patient at risk for DKA?: Yes  Does patient have ketone test strips?: Yes  Does patient have DKA prevention plan?: Yes  Hypoglycemia: Confusion, Dizziness/Lightheadedness,  Sweats, None, Nervousness/Anxiety (tongue- numb)  Hypoglycemia Complications: Nocturnal hypoglycemia  Reducing Risks:  Reducing Risks Assessed Today: No  Has dilated eye exam at least once a year?: Yes  Sees dentist every 6 months?: Yes  Feet checked by healthcare provider in the last year?: Yes    Healthy Coping:  Healthy Coping Assessed Today: Yes  Emotional response to diabetes: Ready to learn  Informal Support system:: Family, Friends, Other  Stage of change: ACTION (Actively working towards change)    Milena Grey RN  Time Spent: 75 minutes  Encounter Type: Individual    Any diabetes medication dose changes were made via the CDCES Standing Orders under the patient's referring provider.

## 2025-01-29 ENCOUNTER — ALLIED HEALTH/NURSE VISIT (OUTPATIENT)
Dept: EDUCATION SERVICES | Facility: CLINIC | Age: 25
End: 2025-01-29
Payer: COMMERCIAL

## 2025-01-29 ENCOUNTER — TELEPHONE (OUTPATIENT)
Dept: EDUCATION SERVICES | Facility: CLINIC | Age: 25
End: 2025-01-29

## 2025-01-29 DIAGNOSIS — E10.649 TYPE 1 DIABETES MELLITUS WITH HYPOGLYCEMIA AND WITHOUT COMA (H): Primary | ICD-10-CM

## 2025-01-29 DIAGNOSIS — E10.65 TYPE 1 DIABETES MELLITUS WITH HYPERGLYCEMIA (H): ICD-10-CM

## 2025-01-29 PROCEDURE — G0108 DIAB MANAGE TRN  PER INDIV: HCPCS

## 2025-01-29 NOTE — LETTER
1/29/2025         RE: Heidi Madden  [address] [place] [address]        Dear Colleague,    Thank you for referring your patient, Heidi Madden, to the [facility]. Please see a copy of my visit note below.    Diabetes Self-Management Education & Support    Presents for: Insulin Pump Pre-Start    Type of Service: In Person Visit    Assessment  Patient here for prepump education.she would like to go forward with the Omnipod 5 with the Dexcom G7 sensor.    Review of CGM report. Glucose overall average 171mg/dl,  in target 52%, above target 46% and below target 2% of the time. Glucose pattern has improved from previous visit, less variability, decrease in time above 250mg/dl.     She reports consistently taking her insulin every day, and times the Humalog 20 minutes before meals most of the time.   She is taking 16 units of semglee at bedtime and follow I:C ratio and correction as follow:   Humalog: carbohydrate ratio  Breakfast: 1:6 (approx 11-12 units/meal on weekday and 20 units on weekend)  Lunch: 1:9  (approx 12-16 units per meal)  Dinner: 1:7  (approx 12-16 units per meal)  Correction 1:50 > 150   Humalog average: 44 units/day  ----------------------------------------  TDD: 16 + 44 = 60 unit(s)/day    Basal 26%/Bolus 74%      Care Plan and Education Provided:  Insulin Pump Concepts:  -- Pump hardware - tubed vs pod/patch, infusion sets, cannulas, reservoirs & cartridges   -- Pump software - automated insulin delivery systems, features of different AID algorithms   -- Balancing glucose and insulin - carbohydrate counting, bolus calculator, ICR, ISF, timing of insulin delivery, troubleshooting missed boluses   -- Problem solving with insulin pump therapy - risk of pump failure, MDI back up plan, risk of DKA, keeping adequate supplies on hand, risk of hypoglycemia, exiting and re-entering automated delivery modes   -- Hands on practice with insulin pump simulator apps         Patient verbalized understanding of diabetes self-management education concepts discussed, opportunities for ongoing education and support, and recommendations provided today.    Plan  Patient has completed the insulin pump pre-education. Orders sent to  for approval    Omnipod 5  - [email or web address]    24/7 Customer Care helpline [phone]    On our End:   - We will let your Endocrinology provider know that you have received the necessary education from us to move forward with insulin pump therapy.  A request for the  orders- insulin pump, pods, insulin vials, glucagon and ketone strips will go to  for approval.   - We will work on your pump starting dose orders and provide to the pump company's diabetes educator.  Please remember, this may take time as your diabetes provider needs to approve this order and usually send a paper copy to the pump company via fax.    On Your End:   - Continue to watch for voicemails and/or MyChart messages (notifications ON).  We may need more information from you to complete tasks for starting insulin pump therapy.    - The pump company or pharmacy will also be contacting you for financial and shipping information before they can ship your pump and supplies. Nothing will be shipped if they are not able to communicate with you!  - Once you have received your pump and Omnipods, follow the instructions included in your pump box on how to set up your account and get set up for the training. Watch for communication from your pump company (calls, emails, texts) to schedule your pump training with a diabetes educator from the pump company. They will meet with you at a convenient, public location (coffee shop or library, for example) to provide you with one on one training to start your pump. This usually takes around 2 hours.   -  your prescriptions at your pharmacy (insulin vial, glucagon, ketone sticks) prior to your pump start appointment.You will  need a vial of insulin for your pump start training.   - As soon as you have your pump start training scheduled, schedule a follow up appointment with Milena Grey RN 1 to 2 weeks after your training date.  You can schedule via [employer] or by calling [phone].    If you have trouble with your pump between the time you start your pump and your 1-2 week follow up, contact the pump training for assistance.   If you are not confident in using your pump, then return to your pre-pump insulin pens and doses.     Please reach out with any further questions or concerns!      High Blood Sugar (>250 mg/dl) Protocol for Pump Users:  If a single blood sugar reading is above 250 mg/dl, follow these steps:   1. Immediately take a correction bolus with the pump using your bolus wizard (like normal).    2. Recheck your blood sugar in 1 hour.  If your blood sugar has gone down, your pump is working properly and continue to monitor your blood sugars as usual. If your blood sugar is not coming down in an hour, follow these steps closely and entirely:    A. You need to take an injection of rapid acting insulin using an insulin syringe or insulin pen (not using your pump).  Give this dose 2 to 3 hours to work.  Do not give any correction doses with your pump until this time is up or you will stack insulin and likely have a low blood sugar later.    B. Change out your entire infusion set, tubing and reservoir, being sure to use a fresh unopened, unused vial of insulin if you have it.  Changing your site is a MUST!  Do it right away.  IF IN DOUBT, CHANGE IT OUT!!    C. Check your blood or urine for ketones.  If you have moderate or large urine ketones or if your blood ketones are over 0.6 mmol/L- Call your provider!!  You will need to take additional insulin and they can provide instruction.  Do not wait for them to get back to you before doing the following steps: Drink at least a cup of water every hour and limit physical  "activity.  1.  Recheck your blood glucose and ketones in 60 minutes.  If your blood sugar is still not coming down and ketones are still present- REPORT TO YOUR NEAREST EMERGENCY ROOM!      **If at any time, you have symptoms of extreme headache, severe nausea, any vomiting or trouble breathing in conjunction with elevated blood sugars- immediately report to your nearest emergency room!     Follow-up:  Upcoming Diabetes Ed Appointments     Visit Type Date Time Department    DIABETES ED 1/29/2025 10:30 AM [facility] DIABETES ED        See Care Plan for co-developed, patient-state behavior change goals.    Education Materials Provided:  No new materials provided today      Subjective/Objective  Malcolm is an 24 year old year old, presenting for the following diabetes education related to: Presents for: Insulin Pump Pre-Start  Accompanied by: Self  Diabetes education in the past 24mo: Yes  Focus of Visit: Insulin Pump  Type of Pump visit: Pre-pump  Diabetes type: Type 1  Disease course: Stable  How confident are you filling out medical forms by yourself:: Extremely  Transportation concerns: No  Difficulty affording diabetes medication?: No  Difficulty affording diabetes testing supplies?: No  Other concerns:: None  Cultural Influences/Ethnic Background:  Not  or     Diabetes Symptoms & Complications:  Diabetes Related Symptoms: None  Weight trend: Stable  Symptom course: Stable  Disease course: Stable  Complications assessed today?: No    Patient Problem List and Family Medical History reviewed for relevant medical history, current medical status, and diabetes risk factors.    Vitals:  [facility] 11/04/2024 (Approximate)   Estimated body mass index is 23.45 kg/m  as calculated from the following:    Height as of 12/4/24: 1.651 m (5' 5\").    Weight as of 12/4/24: 63.9 kg (140 lb 14.4 oz).   Last 3 BP:   BP Readings from Last 3 Encounters:   12/04/24 118/72   10/09/24 101/69   04/17/24 124/82       History   Smoking Status " "    Never   Smokeless Tobacco     Never     Comment: No one in family smokes.       Labs:  Lab Results   Component Value Date    A1C 5.8 11/27/2024    A1C 5.5 12/10/2019     Lab Results   Component Value Date     03/16/2018     03/16/2018     Lab Results   Component Value Date    LDL 86 01/04/2023     03/15/2019     HDL Cholesterol   Date Value Ref Range Status   03/15/2019 55 >45 mg/dL Final     Direct Measure HDL   Date Value Ref Range Status   01/04/2023 73 >=50 mg/dL Final   ]  GFR Estimate   Date Value Ref Range Status   11/27/2024 >90 >60 mL/min/1.73m2 Final     Comment:     The generation of the estimated GFR is currently based on binary male or female sex. If the electronic health record information indicates another gender identity or if Legal Sex is recorded as \"Unknown\", GFR estimates are not automatically calculated, and application of GFR equations or a direct GFR measurement should be considered according to the individual's appropriate clinical context.  eGFR calculated using 2021 CKD-EPI equation.   03/16/2018 >90 >60 mL/min/1.7m2 Final     Comment:     Non  GFR Calc     GFR Estimate If Black   Date Value Ref Range Status   03/16/2018 >90 >60 mL/min/1.7m2 Final     Comment:      GFR Calc     Lab Results   Component Value Date    CR 0.67 11/27/2024    CR 0.56 03/16/2018     No results found for: \"MICROALBUMIN\"      Monitoring:  Monitoring Assessed Today: Yes  Did patient bring glucose meter to appointment? : Yes  Blood Glucose Meter: Accu-chek, CGM  Times checking blood sugar at home (number): 5+  Times checking blood sugar at home (per): Day            Taking Medications:  Diabetes Medication(s)       Diabetic Other       Glucagon (BAQSIMI TWO PACK) 3 MG/DOSE nasal powder Spray 1 spray (3 mg) in nostril as needed (hypoglycemic siezure or unconsciousness)       Insulin       insulin degludec (TRESIBA FLEXTOUCH) 100 UNIT/ML pen Inject 15 Units " subcutaneously daily.     Insulin Glargine-yfgn (SEMGLEE, YFGN,) 100 UNIT/ML SOPN Inject 15 Units subcutaneously daily.     insulin lispro (HUMALOG KWIKPEN) 100 UNIT/ML (1 unit dial) KWIKPEN INJECT UP TO 60 UNITS DAILY          Taking Medication Assessed Today: Yes  Current Treatments: Insulin Injections  Dose schedule: Pre-breakfast, Pre-lunch, Pre-dinner  Given by: Patient  Injection/Infusion sites: Abdomen, Arms, Buttocks, Thighs  Problems taking diabetes medications regularly?: Yes  Diabetes medication side effects?: No    Problem Solving:  Problem Solving Assessed Today: Yes  Is the patient at risk for hypoglycemia?: Yes  Hypoglycemia Frequency: Weekly  Hypoglycemia Treatment: Other food, Candy, Juice  Medical ID: No  Does patient have glucagon emergency kit?: Yes  Is the patient at risk for DKA?: Yes  Does patient have ketone test strips?: Yes  Does patient have DKA prevention plan?: Yes  Hypoglycemia: Confusion, Dizziness/Lightheadedness, Sweats, None, Nervousness/Anxiety (tongue- numb)  Hypoglycemia Complications: Nocturnal hypoglycemia    Milena Grey RN  Time Spent: 75 minutes  Encounter Type: Individual    Any diabetes medication dose changes were made via the CDCES Standing Orders under the patient's referring provider.

## 2025-01-29 NOTE — PROGRESS NOTES
Diabetes Self-Management Education & Support    Presents for: Insulin Pump Pre-Start    Type of Service: In Person Visit    Assessment  Patient here for prepump education.she would like to go forward with the Omnipod 5 with the Dexcom G7 sensor.    Review of CGM report. Glucose overall average 171mg/dl,  in target 52%, above target 46% and below target 2% of the time. Glucose pattern has improved from previous visit, less variability, decrease in time above 250mg/dl.     She reports consistently taking her insulin every day, and times the Humalog 20 minutes before meals most of the time.   She is taking 16 units of semglee at bedtime and follow I:C ratio and correction as follow:   Humalog: carbohydrate ratio  Breakfast: 1:6 (approx 11-12 units/meal on weekday and 20 units on weekend)  Lunch: 1:9  (approx 12-16 units per meal)  Dinner: 1:7  (approx 12-16 units per meal)  Correction 1:50 > 150   Humalog average: 44 units/day  ----------------------------------------  TDD: 16 + 44 = 60 unit(s)/day    Basal 26%/Bolus 74%      Care Plan and Education Provided:  Insulin Pump Concepts:  -- Pump hardware - tubed vs pod/patch, infusion sets, cannulas, reservoirs & cartridges   -- Pump software - automated insulin delivery systems, features of different AID algorithms   -- Balancing glucose and insulin - carbohydrate counting, bolus calculator, ICR, ISF, timing of insulin delivery, troubleshooting missed boluses   -- Problem solving with insulin pump therapy - risk of pump failure, MDI back up plan, risk of DKA, keeping adequate supplies on hand, risk of hypoglycemia, exiting and re-entering automated delivery modes   -- Hands on practice with insulin pump simulator apps        Patient verbalized understanding of diabetes self-management education concepts discussed, opportunities for ongoing education and support, and recommendations provided today.    Plan  Patient has completed the insulin pump pre-education. Orders sent  to  for approval    Omnipod 5  - [email or web address]    24/7 Customer Care helpline [phone]    On our End:   - We will let your Endocrinology provider know that you have received the necessary education from us to move forward with insulin pump therapy.  A request for the  orders- insulin pump, pods, insulin vials, glucagon and ketone strips will go to  for approval.   - We will work on your pump starting dose orders and provide to the pump company's diabetes educator.  Please remember, this may take time as your diabetes provider needs to approve this order and usually send a paper copy to the pump company via fax.    On Your End:   - Continue to watch for voicemails and/or [employer] messages (notifications ON).  We may need more information from you to complete tasks for starting insulin pump therapy.    - The pump company or pharmacy will also be contacting you for financial and shipping information before they can ship your pump and supplies. Nothing will be shipped if they are not able to communicate with you!  - Once you have received your pump and Omnipods, follow the instructions included in your pump box on how to set up your account and get set up for the training. Watch for communication from your pump company (calls, emails, texts) to schedule your pump training with a diabetes educator from the pump company. They will meet with you at a convenient, public location (coffee shop or library, for example) to provide you with one on one training to start your pump. This usually takes around 2 hours.   -  your prescriptions at your pharmacy (insulin vial, glucagon, ketone sticks) prior to your pump start appointment.You will need a vial of insulin for your pump start training.   - As soon as you have your pump start training scheduled, schedule a follow up appointment with Milena Grey RN 1 to 2 weeks after your training date.  You can schedule via [employer] or by calling  [phone].    If you have trouble with your pump between the time you start your pump and your 1-2 week follow up, contact the pump training for assistance.   If you are not confident in using your pump, then return to your pre-pump insulin pens and doses.     Please reach out with any further questions or concerns!      High Blood Sugar (>250 mg/dl) Protocol for Pump Users:  If a single blood sugar reading is above 250 mg/dl, follow these steps:   1. Immediately take a correction bolus with the pump using your bolus wizard (like normal).    2. Recheck your blood sugar in 1 hour.  If your blood sugar has gone down, your pump is working properly and continue to monitor your blood sugars as usual. If your blood sugar is not coming down in an hour, follow these steps closely and entirely:    A. You need to take an injection of rapid acting insulin using an insulin syringe or insulin pen (not using your pump).  Give this dose 2 to 3 hours to work.  Do not give any correction doses with your pump until this time is up or you will stack insulin and likely have a low blood sugar later.    B. Change out your entire infusion set, tubing and reservoir, being sure to use a fresh unopened, unused vial of insulin if you have it.  Changing your site is a MUST!  Do it right away.  IF IN DOUBT, CHANGE IT OUT!!    C. Check your blood or urine for ketones.  If you have moderate or large urine ketones or if your blood ketones are over 0.6 mmol/L- Call your provider!!  You will need to take additional insulin and they can provide instruction.  Do not wait for them to get back to you before doing the following steps: Drink at least a cup of water every hour and limit physical activity.  1.  Recheck your blood glucose and ketones in 60 minutes.  If your blood sugar is still not coming down and ketones are still present- REPORT TO YOUR NEAREST EMERGENCY ROOM!      **If at any time, you have symptoms of extreme headache, severe nausea,  "any vomiting or trouble breathing in conjunction with elevated blood sugars- immediately report to your nearest emergency room!     Follow-up:  Upcoming Diabetes Ed Appointments     Visit Type Date Time Department    DIABETES ED 1/29/2025 10:30 AM  DIABETES ED        See Care Plan for co-developed, patient-state behavior change goals.    Education Materials Provided:  No new materials provided today      Subjective/Objective  Malcolm is an 24 year old year old, presenting for the following diabetes education related to: Presents for: Insulin Pump Pre-Start  Accompanied by: Self  Diabetes education in the past 24mo: Yes  Focus of Visit: Insulin Pump  Type of Pump visit: Pre-pump  Diabetes type: Type 1  Disease course: Stable  How confident are you filling out medical forms by yourself:: Extremely  Transportation concerns: No  Difficulty affording diabetes medication?: No  Difficulty affording diabetes testing supplies?: No  Other concerns:: None  Cultural Influences/Ethnic Background:  Not  or     Diabetes Symptoms & Complications:  Diabetes Related Symptoms: None  Weight trend: Stable  Symptom course: Stable  Disease course: Stable  Complications assessed today?: No    Patient Problem List and Family Medical History reviewed for relevant medical history, current medical status, and diabetes risk factors.    Vitals:  LMP 11/04/2024 (Approximate)   Estimated body mass index is 23.45 kg/m  as calculated from the following:    Height as of 12/4/24: 1.651 m (5' 5\").    Weight as of 12/4/24: 63.9 kg (140 lb 14.4 oz).   Last 3 BP:   BP Readings from Last 3 Encounters:   12/04/24 118/72   10/09/24 101/69   04/17/24 124/82       History   Smoking Status    Never   Smokeless Tobacco    Never     Comment: No one in family smokes.       Labs:  Lab Results   Component Value Date    A1C 5.8 11/27/2024    A1C 5.5 12/10/2019     Lab Results   Component Value Date     03/16/2018     03/16/2018     Lab " "Results   Component Value Date    LDL 86 01/04/2023     03/15/2019     HDL Cholesterol   Date Value Ref Range Status   03/15/2019 55 >45 mg/dL Final     Direct Measure HDL   Date Value Ref Range Status   01/04/2023 73 >=50 mg/dL Final   ]  GFR Estimate   Date Value Ref Range Status   11/27/2024 >90 >60 mL/min/1.73m2 Final     Comment:     The generation of the estimated GFR is currently based on binary male or female sex. If the electronic health record information indicates another gender identity or if Legal Sex is recorded as \"Unknown\", GFR estimates are not automatically calculated, and application of GFR equations or a direct GFR measurement should be considered according to the individual's appropriate clinical context.  eGFR calculated using 2021 CKD-EPI equation.   03/16/2018 >90 >60 mL/min/1.7m2 Final     Comment:     Non  GFR Calc     GFR Estimate If Black   Date Value Ref Range Status   03/16/2018 >90 >60 mL/min/1.7m2 Final     Comment:      GFR Calc     Lab Results   Component Value Date    CR 0.67 11/27/2024    CR 0.56 03/16/2018     No results found for: \"MICROALBUMIN\"      Monitoring:  Monitoring Assessed Today: Yes  Did patient bring glucose meter to appointment? : Yes  Blood Glucose Meter: Accu-chek, CGM  Times checking blood sugar at home (number): 5+  Times checking blood sugar at home (per): Day            Taking Medications:  Diabetes Medication(s)       Diabetic Other       Glucagon (BAQSIMI TWO PACK) 3 MG/DOSE nasal powder Spray 1 spray (3 mg) in nostril as needed (hypoglycemic siezure or unconsciousness)       Insulin       insulin degludec (TRESIBA FLEXTOUCH) 100 UNIT/ML pen Inject 15 Units subcutaneously daily.     Insulin Glargine-yfgn (SEMGLEE, YFGN,) 100 UNIT/ML SOPN Inject 15 Units subcutaneously daily.     insulin lispro (HUMALOG KWIKPEN) 100 UNIT/ML (1 unit dial) KWIKPEN INJECT UP TO 60 UNITS DAILY          Taking Medication Assessed Today: " Yes  Current Treatments: Insulin Injections  Dose schedule: Pre-breakfast, Pre-lunch, Pre-dinner  Given by: Patient  Injection/Infusion sites: Abdomen, Arms, Buttocks, Thighs  Problems taking diabetes medications regularly?: Yes  Diabetes medication side effects?: No    Problem Solving:  Problem Solving Assessed Today: Yes  Is the patient at risk for hypoglycemia?: Yes  Hypoglycemia Frequency: Weekly  Hypoglycemia Treatment: Other food, Candy, Juice  Medical ID: No  Does patient have glucagon emergency kit?: Yes  Is the patient at risk for DKA?: Yes  Does patient have ketone test strips?: Yes  Does patient have DKA prevention plan?: Yes  Hypoglycemia: Confusion, Dizziness/Lightheadedness, Sweats, None, Nervousness/Anxiety (tongue- numb)  Hypoglycemia Complications: Nocturnal hypoglycemia    Milena Grey RN  Time Spent: 75 minutes  Encounter Type: Individual    Any diabetes medication dose changes were made via the CDCES Standing Orders under the patient's referring provider.

## 2025-01-29 NOTE — PATIENT INSTRUCTIONS
Plan:  You have completed the insulin pump pre-education.     Omnipod 5  - [email or web address]    24/7 Customer Care helpline [phone]    On our End:   - We will let your Endocrinology provider know that you have received the necessary education from us to move forward with insulin pump therapy.  A request for the  orders- insulin pump, pods, insulin vials, glucagon and ketone strips will go to  for approval.   - We will work on your pump starting dose orders and provide to the pump company's diabetes educator.  Please remember, this may take time as your diabetes provider needs to approve this order and usually send a paper copy to the pump company via fax.    On Your End:   - Continue to watch for voicemails and/or writewithhart messages (notifications ON).  We may need more information from you to complete tasks for starting insulin pump therapy.    - The pump company or pharmacy will also be contacting you for financial and shipping information before they can ship your pump and supplies. Nothing will be shipped if they are not able to communicate with you!  - Once you have received your pump and Omnipods, follow the instructions included in your pump box on how to set up your account and get set up for the training. Watch for communication from your pump company (calls, emails, texts) to schedule your pump training with a diabetes educator from the pump company. They will meet with you at a convenient, public location (coffee shop or library, for example) to provide you with one on one training to start your pump. This usually takes around 2 hours.   -  your prescriptions at your pharmacy (insulin vial, glucagon, ketone sticks) prior to your pump start appointment.You will need a vial of insulin for your pump start training.   - As soon as you have your pump start training scheduled, schedule a follow up appointment with Milena Grey RN 1 to 2 weeks after your training date.  You can  schedule via [employer] or by calling [phone].    If you have trouble with your pump between the time you start your pump and your 1-2 week follow up, contact the pump training for assistance.   If you are not confident in using your pump, then return to your pre-pump insulin pens and doses.     Please reach out with any further questions or concerns!      High Blood Sugar (>250 mg/dl) Protocol for Pump Users:  If a single blood sugar reading is above 250 mg/dl, follow these steps:   1. Immediately take a correction bolus with the pump using your bolus wizard (like normal).    2. Recheck your blood sugar in 1 hour.  If your blood sugar has gone down, your pump is working properly and continue to monitor your blood sugars as usual. If your blood sugar is not coming down in an hour, follow these steps closely and entirely:    A. You need to take an injection of rapid acting insulin using an insulin syringe or insulin pen (not using your pump).  Give this dose 2 to 3 hours to work.  Do not give any correction doses with your pump until this time is up or you will stack insulin and likely have a low blood sugar later.    B. Change out your entire infusion set, tubing and reservoir, being sure to use a fresh unopened, unused vial of insulin if you have it.  Changing your site is a MUST!  Do it right away.  IF IN DOUBT, CHANGE IT OUT!!    C. Check your blood or urine for ketones.  If you have moderate or large urine ketones or if your blood ketones are over 0.6 mmol/L- Call your provider!!  You will need to take additional insulin and they can provide instruction.  Do not wait for them to get back to you before doing the following steps: Drink at least a cup of water every hour and limit physical activity.  1.  Recheck your blood glucose and ketones in 60 minutes.  If your blood sugar is still not coming down and ketones are still present- REPORT TO YOUR NEAREST EMERGENCY ROOM!      **If at any time, you have symptoms  of extreme headache, severe nausea, any vomiting or trouble breathing in conjunction with elevated blood sugars- immediately report to your nearest emergency room!       [facility] for the [facility] Area:  For Your Diabetes Education and Nutrition Appointments Call:  [phone]   For Diabetes Education or Nutrition Related Questions:   Phone: [phone]  Send Deminos Message   If you need a medication refill please contact your pharmacy. Please allow 3 business days for your refills to be completed.

## 2025-01-29 NOTE — TELEPHONE ENCOUNTER
Semglee 16 units at bedtime   Humalog: carbohydrate ratio  Breakfast: 1:6 (approx 11-12 units/meal on weekday and 20 units on weekend)  Lunch: 1:9  (approx 12-16 units per meal)  Dinner: 1:7  (approx 12-16 units per meal)  Correction 1:50 > 150   Humalog average: 44 units/day  17 + 44 = 60 unit(s)/day     Basal 26%/Bolus 74%    Patient current dosing is bolus heavy, recommend use current basal dose x 10% = 17.6 and round up th 18 units/24 hours.   Based on her Dexcom report and the last 3 days her carbohydrate ratios, with correction included are as follow:  Breakfast 1:6  Lunch 1:7   Dinner 1:5     Recommend she start with carbohydrate ratio of 1:6, with the increase to basal and automated delivery she may not need as much correction.       Patient Initial Pump settings:  Omnipod  BASAL RATES and times:  12 AM (midnight): 075 units/hour    CARB RATIO and times:  12 AM (midnight): 1:6   Corection Factor (Sensitivity) and times:  12 AM - 12 AM: 50 mg/dL    Active Insulin Time: 3 hours   BLOOD GLUCOSE TARGET: 120mg/dl  Correct above 120mg/dl  Reverse correction: On  Minimum BG for bolus calculation:70mg/dl   Max basal: 2.0  Max bolus: 20  Temp basal: percent  Low reservoir: 10 units  Pod expiration: 1-3 hours per patient preference      Additional notes: Adjustments to current basal insulin(Lantus/Levemir/Basaglar/Toujeo/Tresiba) prior to pump start? No basal insulin the night before the pump start    Pump  is approved to adjust insulin doses for up to 30 days after pump initiation if:  A pattern is noted for hypoglycemia of less than 80mg/dl or hyperglycemia of over 250mg/dl -  can adjust dose by 10%      Addition order needed:  Insulin vials Yes   Test strips  No   Glucagon Yes   Ketone test strips Yes   Syringes No     Please let me know if you agree with above initiation settings or indicate alternative plan.    Milena SALINASN, RN,[facility]

## 2025-01-30 ENCOUNTER — OFFICE VISIT (OUTPATIENT)
Dept: FAMILY MEDICINE | Facility: CLINIC | Age: 25
End: 2025-01-30
Payer: COMMERCIAL

## 2025-01-30 VITALS
RESPIRATION RATE: 18 BRPM | SYSTOLIC BLOOD PRESSURE: 120 MMHG | HEART RATE: 89 BPM | BODY MASS INDEX: 23.06 KG/M2 | DIASTOLIC BLOOD PRESSURE: 76 MMHG | OXYGEN SATURATION: 99 % | HEIGHT: 65 IN | WEIGHT: 138.4 LBS | TEMPERATURE: 97.5 F

## 2025-01-30 DIAGNOSIS — J01.00 ACUTE MAXILLARY SINUSITIS, RECURRENCE NOT SPECIFIED: Primary | ICD-10-CM

## 2025-01-30 RX ORDER — GLUCAGON 3 MG/1
3 POWDER NASAL PRN
Qty: 1 EACH | Refills: 1 | Status: SHIPPED | OUTPATIENT
Start: 2025-01-30

## 2025-01-30 RX ORDER — INSULIN PMP CART,AUT,G6/7,CNTR
1 EACH SUBCUTANEOUS CONTINUOUS
Qty: 1 KIT | Refills: 0 | Status: SHIPPED | OUTPATIENT
Start: 2025-01-30

## 2025-01-30 RX ORDER — CHLORPHENIR/PHENYLEPH/ASPIRIN 2-7.8-325
1 TABLET, EFFERVESCENT ORAL PRN
Qty: 50 STRIP | Refills: 2 | Status: SHIPPED | OUTPATIENT
Start: 2025-01-30

## 2025-01-30 RX ORDER — FLUTICASONE PROPIONATE 50 MCG
1 SPRAY, SUSPENSION (ML) NASAL DAILY
Qty: 11.1 ML | Refills: 0 | Status: SHIPPED | OUTPATIENT
Start: 2025-01-30

## 2025-01-30 RX ORDER — FLUCONAZOLE 150 MG/1
150 TABLET ORAL ONCE
Qty: 1 TABLET | Refills: 0 | Status: SHIPPED | OUTPATIENT
Start: 2025-01-30 | End: 2025-01-30

## 2025-01-30 RX ORDER — INSULIN LISPRO 100 [IU]/ML
INJECTION, SOLUTION INTRAVENOUS; SUBCUTANEOUS
Qty: 60 ML | Refills: 3 | Status: SHIPPED | OUTPATIENT
Start: 2025-01-30

## 2025-01-30 RX ORDER — INSULIN PMP CART,AUT,G6/7,CNTR
1 EACH SUBCUTANEOUS
Qty: 30 EACH | Refills: 3 | Status: SHIPPED | OUTPATIENT
Start: 2025-01-30

## 2025-01-30 ASSESSMENT — PATIENT HEALTH QUESTIONNAIRE - PHQ9
SUM OF ALL RESPONSES TO PHQ QUESTIONS 1-9: 10
SUM OF ALL RESPONSES TO PHQ QUESTIONS 1-9: 10
10. IF YOU CHECKED OFF ANY PROBLEMS, HOW DIFFICULT HAVE THESE PROBLEMS MADE IT FOR YOU TO DO YOUR WORK, TAKE CARE OF THINGS AT HOME, OR GET ALONG WITH OTHER PEOPLE: VERY DIFFICULT

## 2025-01-30 ASSESSMENT — COLUMBIA-SUICIDE SEVERITY RATING SCALE - C-SSRS
6. HAVE YOU EVER DONE ANYTHING, STARTED TO DO ANYTHING, OR PREPARED TO DO ANYTHING TO END YOUR LIFE?: NO
2. IN THE PAST MONTH, HAVE YOU ACTUALLY HAD ANY THOUGHTS OF KILLING YOURSELF?: NO
1. WITHIN THE PAST MONTH, HAVE YOU WISHED YOU WERE DEAD OR WISHED YOU COULD GO TO SLEEP AND NOT WAKE UP?: YES

## 2025-01-30 NOTE — PROGRESS NOTES
Assessment & Plan     Acute maxillary sinusitis, recurrence not specified  Will treat sinusitis with Augmentin as below. Patient shares that they will usually get yeast infections after taking antibiotics, so will send one diflucan tablet to be taken if this should occur. Also discussed using Flonase 1-2 sprays in each nostril daily to open the nasal passages and promote drainage. Encouraged patient to continue with supportive cares at home.   - amoxicillin-clavulanate (AUGMENTIN) 875-125 MG tablet  Dispense: 20 tablet; Refill: 0  - fluticasone (FLONASE) 50 MCG/ACT nasal spray  Dispense: 11.1 mL; Refill: 0  - fluconazole (DIFLUCAN) 150 MG tablet  Dispense: 1 tablet; Refill: 0    Patient should follow up in 2 weeks if symptoms do not improve or sooner for new or worsening symptoms. All questions answered to patient's satisfaction. Warning signs of when to seek emergency care were discussed.     Ginger Green PA-C    Depression Screening Follow Up      1/30/2025    10:23 AM   PHQ   PHQ-9 Total Score 10    Q9: Thoughts of better off dead/self-harm past 2 weeks Several days   F/U: Thoughts of suicide or self-harm Yes   F/U: Self harm-plan Yes   F/U: Self-harm action No   F/U: Safety concerns No       Patient-reported         1/30/2025    10:23 AM   Last PHQ-9   1.  Little interest or pleasure in doing things 1   2.  Feeling down, depressed, or hopeless 3   3.  Trouble falling or staying asleep, or sleeping too much 0   4.  Feeling tired or having little energy 3   5.  Poor appetite or overeating 0   6.  Feeling bad about yourself 2   7.  Trouble concentrating 0   8.  Moving slowly or restless 0   Q9: Thoughts of better off dead/self-harm past 2 weeks 1   PHQ-9 Total Score 10    In the past two weeks have you had thoughts of suicide or self harm? Yes   Do you have concerns about your personal safety or the safety of others? No   In the past 2 weeks have you thought about a plan or had intention to harm yourself? Yes    In the past 2 weeks have you acted on these thoughts in any way? No       Patient-reported         3/22/2019     3:29 PM 1/30/2025    10:23 AM   PHQ   PHQ-9 Total Score 7 10    Q9: Thoughts of better off dead/self-harm past 2 weeks Not at all Several days   F/U: Thoughts of suicide or self-harm  Yes   F/U: Self harm-plan  Yes   F/U: Self-harm action  No   F/U: Safety concerns  No       Patient-reported           1/30/2025     4:46 PM   C-SSRS (Brief [place])   Within the last month, have you wished you were dead or wished you could go to sleep and not wake up? Yes   Within the last month, have you had any actual thoughts of killing yourself? No   Within the last month, have you ever done anything, started to do anything, or prepared to do anything to end your life? No     Follow Up Actions Taken  Crisis resource information provided in the After Visit Summary  Patient to make appointment with PCP. They are due for annual visit.   Discussed the following ways the patient can remain in a safe environment:  be around others    Subjective   Malcolm is a 24 year old, presenting for the following health issues:  Ear Problem        1/30/2025     4:04 PM   Additional Questions   Roomed by clark diaz   Accompanied by self     History of Present Illness       Reason for visit:  Possible ear and sinus infection  Symptom onset:  3-4 weeks ago  Symptoms include:  Left Ear and sinus congestion, left ear pain, cough  Symptom intensity:  Mild  Symptom progression:  Worsening  Had these symptoms before:  Yes  Has tried/received treatment for these symptoms:  Yes  Previous treatment was successful:  Yes  Prior treatment description:  Antibiotics for sinus infection but the ear is a new issue  What makes it worse:  Touching behind the ear hurts, laying back/sleeping makes congestion symptoms worse  What makes it better:  Warm compress on ear is soothing but doesn't really help. Tea with honey and rest help the sinus symptoms   Malcolm is  "taking medications regularly.         Malcolm is a 24 year old who presents today with left-sided ear pain and pressure.     Started 3 weeks ago. Feels an ear fullness. Had a few days where hearing was worse and plugged up. [place] like there was fluid built up behind the ear. A few days later it started to hurt. Today pain has pretty much gone away, but has some tenderneess if they push behind the ear.     Has tried warm packs. Did an OTC decongestant which cleared the nasal congestion.    Felt sick over the weekend and had a cough and was congested. Sputum and nasal discharge is dark green/yellow in color.     Acute Illness ear and head fullness  Acute illness concerns:   Onset/Duration: 3 weeks  Symptoms:  Fever: No  Chills/Sweats: No  Headache (location?): YES  Sinus Pressure: YES  Conjunctivitis:  YES  Ear Pain: YES: left  Rhinorrhea: YES  Congestion: No  Sore Throat: No  Cough: YES  Wheeze: No  Decreased Appetite: No  Nausea: No  Vomiting: No  Diarrhea: No  Dysuria/Freq.: No  Dysuria or Hematuria: No  Fatigue/Achiness: No  Sick/Strep Exposure: No  Therapies tried and outcome: tea, warm compress and cough drops    Review of Systems  Constitutional, neuro, ENT, endocrine, pulmonary, cardiac, gastrointestinal, genitourinary, musculoskeletal, integument and psychiatric systems are negative, except as otherwise noted.      Objective    /76   Pulse 89   Temp 97.5  F (36.4  C) (Tympanic)   Resp 18   Ht 1.651 m (5' 5\")   Wt 62.8 kg (138 lb 6.4 oz)   LMP 01/30/2025 (Exact Date)   SpO2 99%   BMI 23.03 kg/m    Body mass index is 23.03 kg/m .  Physical Exam   GENERAL: alert and no distress  EYES: Eyes grossly normal to inspection, PERRL and conjunctivae and sclerae normal  HENT: normal cephalic/atraumatic, nasal mucosa edematous , rhinorrhea yellow and green, oropharynx clear but erythematous, and oral mucous membranes moist. No tonsillitis or tonsillar exudates.  NECK: no adenopathy, no asymmetry, masses, or " scars  RESP: lungs clear to auscultation - no rales, rhonchi or wheezes  CV: regular rate and rhythm, normal S1 S2, no S3 or S4, no murmur, click or rub, no peripheral edema  MS: no gross musculoskeletal defects noted, no edema  PSYCH: mentation appears normal, affect normal      Signed Electronically by: Ginger Green PA-C

## 2025-02-03 ENCOUNTER — TELEPHONE (OUTPATIENT)
Dept: ENDOCRINOLOGY | Facility: CLINIC | Age: 25
End: 2025-02-03
Payer: COMMERCIAL

## 2025-02-03 DIAGNOSIS — E10.65 TYPE 1 DIABETES MELLITUS WITH HYPERGLYCEMIA (H): ICD-10-CM

## 2025-02-03 RX ORDER — CHLORPHENIR/PHENYLEPH/ASPIRIN 2-7.8-325
1 TABLET, EFFERVESCENT ORAL PRN
Qty: 50 STRIP | Refills: 2 | Status: SHIPPED | OUTPATIENT
Start: 2025-02-03

## 2025-02-03 NOTE — TELEPHONE ENCOUNTER
[facility] Call Center    Phone Message    May a detailed message be left on voicemail: yes     Reason for Call: Medication Question or concern regarding medication   Prescription Clarification  Name of Medication: test strips   Prescribing Provider: Parnerkar    [facility] - [place], [place] - [address] [place] [address]     What on the order needs clarification? Pharm calling to confirm if strips are for urine or blood. [employer] order did not have any information please confirm if they are used for urine.      Action Taken: Message routed to:  [facility] endo    Travel Screening: Not Applicable     Date of Service:

## 2025-02-06 ENCOUNTER — TELEPHONE (OUTPATIENT)
Dept: ENDOCRINOLOGY | Facility: CLINIC | Age: 25
End: 2025-02-06
Payer: COMMERCIAL

## 2025-02-06 NOTE — TELEPHONE ENCOUNTER
Form was faxed and sent to scanning.      Nydia Kline, [facility] Endocrinology  Summer/Oscar

## 2025-02-06 NOTE — TELEPHONE ENCOUNTER
Omnipod 5 Automated Insulin Delivery System Pump Therapy Order Form    LAST OFFICE/VIRTUAL VISIT:  12/04/24    FUTURE OFFICE/VIRTUAL VISIT:  04/15/25    Lab Results   Component Value Date    A1C 5.8 11/27/2024    A1C 6.3 04/10/2024    A1C 6.4 10/11/2023    A1C 6.3 06/14/2023    A1C 6.5 01/04/2023    A1C 5.5 12/10/2019    A1C 6.1 09/10/2019    A1C 6.1 06/18/2019    A1C 5.7 02/19/2019    A1C 6.0 11/20/2018           Nydia Kline [facility] Endocrinology [place]  [phone]

## 2025-02-06 NOTE — TELEPHONE ENCOUNTER
Forms/paperwork reviewed, completed and signed.  Please fax or send the papers as requested, document in chart and close the encounter.    Thank you.    Zakia Fox MD

## 2025-02-07 ENCOUNTER — MYC MEDICAL ADVICE (OUTPATIENT)
Dept: EDUCATION SERVICES | Facility: CLINIC | Age: 25
End: 2025-02-07
Payer: COMMERCIAL

## 2025-02-10 NOTE — TELEPHONE ENCOUNTER
Spoke with Malcolm, she plans to contact the pharmacy and her insurance regarding the cost of the Baqsimi.   Discussed setting up her Omnipod account and her pump training. She will log into the Omnipod site and schedule her training, and schedule her 1-2 week follow up with me once she knows when her training will be.    Milean SALINASN, RN, PHN, [facility]

## 2025-02-17 ENCOUNTER — MYC MEDICAL ADVICE (OUTPATIENT)
Dept: EDUCATION SERVICES | Facility: CLINIC | Age: 25
End: 2025-02-17
Payer: COMMERCIAL

## 2025-02-18 ENCOUNTER — MYC MEDICAL ADVICE (OUTPATIENT)
Dept: EDUCATION SERVICES | Facility: CLINIC | Age: 25
End: 2025-02-18
Payer: COMMERCIAL

## 2025-02-26 ENCOUNTER — MYC MEDICAL ADVICE (OUTPATIENT)
Dept: EDUCATION SERVICES | Facility: CLINIC | Age: 25
End: 2025-02-26
Payer: COMMERCIAL

## 2025-03-04 ENCOUNTER — ALLIED HEALTH/NURSE VISIT (OUTPATIENT)
Dept: EDUCATION SERVICES | Facility: CLINIC | Age: 25
End: 2025-03-04
Attending: INTERNAL MEDICINE
Payer: COMMERCIAL

## 2025-03-04 DIAGNOSIS — E10.65 TYPE 1 DIABETES MELLITUS WITH HYPERGLYCEMIA (H): Primary | ICD-10-CM

## 2025-03-04 PROCEDURE — G0108 DIAB MANAGE TRN  PER INDIV: HCPCS

## 2025-03-04 PROCEDURE — 99207 PR NO CHARGE NURSE ONLY: CPT

## 2025-03-04 NOTE — PROGRESS NOTES
Diabetes Self-Management Education & Support    Presents for: Insulin Pump and CGM Review    Type of Service: In Person Visit      Assessment  Glucose time in range improving on insulin pump therapy.    Insulin Pump Information  Insulin Pump Brand: OmniPod    Reports:                    Review of Pump and CGM report. Glucose overall average 129mg/dl,  in target 77%, above target 16% and below target 7% of the time. Insulin pump average TDD 47.6 unit(s)/day,  32% basal/68% bolus.  Pattern of post meal hyperglycemia and pre-meal hypoglycemia with frequent auto suspending.     Patient would benefit from increase in carbohydrate ratio and bolus 5-10 minutes before meals, decrease glucose target and not overtreat hypoglycemia.     Care Plan and Education Provided:  Pump Hardware & Software:   -- Navigating insulin pump screen/functions/features  -- Automated insulin delivery functions/features  Bolusing:  -- Importance of accurate carbohydrate counting  -- How to use bolus calculator  Problem Solving:  -- Treating hypoglycemia with use of automated insulin delivery systems  -- DKA prevention & steps to take when glucose is above 240 mg/dL   -- Sick Day management  -- Multiple Daily injection back-up plan in case of pump failure    Patient verbalized understanding of diabetes self-management education concepts discussed, opportunities for ongoing education and support, and recommendations provided today.    Plan  Changes made to your pump today:    Max bolus: 20 --> 25    Glucose target: 120 --> 110  Carbohydrate ratio:    12am: 7.5 --> 8.0    11am: 7.5 --> 8.5     4pm: 7.5 --> 8.0     Follow up:  Follow-up diabetes education send a [employer] message in 2 weeks.     Follow-up:  Upcoming Diabetes Ed Appointments     Visit Type Date Time Department    DIABETES ED 3/4/2025  2:30 PM [facility] DIABETES ED        See Care Plan for co-developed, patient-state behavior change goals.    Education Materials Provided:  No new materials  "provided today      Subjective/Objective  Malcolm is an 24 year old year old, presenting for the following diabetes education related to: Presents for: Insulin Pump and CGM Review  Accompanied by: Self  Diabetes education in the past 24mo: Yes  Focus of Visit: Insulin Pump, CGM  Type of Pump visit: Pump Review  Diabetes type: Type 1  Disease course: Stable  How confident are you filling out medical forms by yourself:: Extremely  Transportation concerns: No  Difficulty affording diabetes medication?: No  Difficulty affording diabetes testing supplies?: No  Other concerns:: None  Cultural Influences/Ethnic Background:  Not  or     Diabetes Symptoms & Complications:  Diabetes Related Symptoms: None  Weight trend: Stable  Symptom course: Stable  Disease course: Stable  Complications assessed today?: No    Patient Problem List and Family Medical History reviewed for relevant medical history, current medical status, and diabetes risk factors.    Vitals:  LMP 01/30/2025 (Exact Date)   Estimated body mass index is 23.03 kg/m  as calculated from the following:    Height as of 1/30/25: 1.651 m (5' 5\").    Weight as of 1/30/25: 62.8 kg (138 lb 6.4 oz).   Last 3 BP:   BP Readings from Last 3 Encounters:   01/30/25 120/76   12/04/24 118/72   10/09/24 101/69       History   Smoking Status    Never   Smokeless Tobacco    Never     Comment: No one in family smokes.       Labs:  Lab Results   Component Value Date    A1C 5.8 11/27/2024    A1C 5.5 12/10/2019     Lab Results   Component Value Date     03/16/2018     03/16/2018     Lab Results   Component Value Date    LDL 86 01/04/2023     03/15/2019     HDL Cholesterol   Date Value Ref Range Status   03/15/2019 55 >45 mg/dL Final     Direct Measure HDL   Date Value Ref Range Status   01/04/2023 73 >=50 mg/dL Final   ]  GFR Estimate   Date Value Ref Range Status   11/27/2024 >90 >60 mL/min/1.73m2 Final     Comment:     The generation of the estimated GFR " "is currently based on binary male or female sex. If the electronic health record information indicates another gender identity or if Legal Sex is recorded as \"Unknown\", GFR estimates are not automatically calculated, and application of GFR equations or a direct GFR measurement should be considered according to the individual's appropriate clinical context.  eGFR calculated using 2021 CKD-EPI equation.   03/16/2018 >90 >60 mL/min/1.7m2 Final     Comment:     Non  GFR Calc     GFR Estimate If Black   Date Value Ref Range Status   03/16/2018 >90 >60 mL/min/1.7m2 Final     Comment:      GFR Calc     Lab Results   Component Value Date    CR 0.67 11/27/2024    CR 0.56 03/16/2018     No results found for: \"MICROALBUMIN\"      Monitoring:  Monitoring Assessed Today: Yes  Did patient bring glucose meter to appointment? : Yes  Blood Glucose Meter: Accu-chek, CGM  Times checking blood sugar at home (number): 5+  Times checking blood sugar at home (per): Day    Taking Medications:  Diabetes Medication(s)       Diabetic Other       Glucagon (BAQSIMI TWO PACK) 3 MG/DOSE nasal powder Spray 1 spray (3 mg) in nostril as needed (hypoglycemic siezure or unconsciousness).       Insulin       insulin degludec (TRESIBA FLEXTOUCH) 100 UNIT/ML pen Inject 15 Units subcutaneously daily.     Insulin Glargine-yfgn (SEMGLEE, YFGN,) 100 UNIT/ML SOPN Inject 15 Units subcutaneously daily.     insulin lispro (HUMALOG KWIKPEN) 100 UNIT/ML (1 unit dial) KWIKPEN INJECT UP TO 60 UNITS DAILY     insulin lispro (HUMALOG VIAL) 100 UNIT/ML vial Use in insulin pump Est TDD 70 units          Taking Medication Assessed Today: Yes  Current Treatments: Insulin Pump  Dose schedule: Pre-breakfast, Pre-lunch, Pre-dinner  Given by: Patient  Injection/Infusion sites: Abdomen, Arms, Buttocks, Thighs  Problems taking diabetes medications regularly?: Yes  Diabetes medication side effects?: No    Problem Solving:  Problem Solving Assessed " Today: Yes  Is the patient at risk for hypoglycemia?: Yes  Hypoglycemia Frequency: Daily  Hypoglycemia Treatment: Other food, Candy, Juice  Medical ID: No  Does patient have glucagon emergency kit?: Yes  Is the patient at risk for DKA?: Yes  Does patient have ketone test strips?: Yes  Does patient have DKA prevention plan?: Yes  Hypoglycemia: Confusion, Dizziness/Lightheadedness, Sweats, None, Nervousness/Anxiety (tongue- numb)  Hypoglycemia Complications: Nocturnal hypoglycemia    Milena Grey RN  Time Spent: 70 minutes  Encounter Type: Individual    Any diabetes medication dose changes were made via the CDCES Standing Orders under the patient's referring provider.

## 2025-03-04 NOTE — PATIENT INSTRUCTIONS
Care Plan:  Changes made to your pump today:    Max bolus: 20 --> 25    Glucose target: 120 --> 110  Carbohydrate ratio:    12am: 7.5 --> 8.0    11am: 7.5 --> 8.5     4pm: 7.5 --> 8.0         High Blood Sugar (>240 mg/dl) Protocol for Pump Users:  If a single blood sugar reading is above 240 mg/dl, follow these steps:   1. Immediately take a correction bolus with the pump using your bolus wizard (like normal).    2. Recheck your blood sugar in 1 hour.  If your blood sugar has gone down, your pump is working properly and continue to monitor your blood sugars as usual. If your blood sugar is not coming down in an hour, follow these steps closely and entirely:    A. You need to take an injection of rapid acting insulin using an insulin syringe or insulin pen (not using your pump).  Give this dose 2 to 3 hours to work.  Do not give any correction doses with your pump until this time is up or you will stack insulin and likely have a low blood sugar later.    B. Change out your entire infusion set, tubing and reservoir, being sure to use a fresh unopened, unused vial of insulin if you have it.  Changing your site is a MUST!  Do it right away.  IF IN DOUBT, CHANGE IT OUT!!    C. Check your blood or urine for ketones.  If you have moderate or large urine ketones or if your blood ketones are over 0.6 mmol/L- Call your provider!!  You will need to take additional insulin and they can provide instruction.  Do not wait for them to get back to you before doing the following steps: Drink at least a cup of water every hour and limit physical activity.  1.  Recheck your blood glucose and ketones in 60 minutes.  If your blood sugar is still not coming down and ketones are still present- REPORT TO YOUR NEAREST EMERGENCY ROOM!      **If at any time, you have symptoms of extreme headache, severe nausea, any vomiting or trouble breathing in conjunction with elevated blood sugars- immediately report to your nearest emergency room!      Insulin Pump Back Up Plan:    If your insulin pump fails or has an issue, the first thing you should do is call the pump company and have them troubleshoot.   *[employer] (O[employer]) 24-hour Customer Support [phone]     If they determine you need a new pump, you will have to go back to using insulin injections until your new pump arrives.  You will need either insulin pen(s) and pen needles and/or insulin syringes to do injections if your pump is not working.  You can purchase pen needles and insulin syringes at any pharmacy without a prescription.  Just ask at the pharmacy counter.    With your long acting insulin, take 18 units once daily   -these insulins could also be called Lantus, Basaglar, Semglee, Rezvoglar (insulin glargine), Levemir (insulin detemir), Toujeo (insulin glargine U300), Tresiba (insulin degludec).  -If you do not have a current long acting prescription and you are unable to reach your doctor to have one sent to the pharmacy, then you can buy a similar long acting insulin called NPH over the counter with no prescription needed at around $25 a vial from any pharmacy.  If you choose this option, you need to take 1/2 the dose above every 12 hours.  Your dose would be 9 units twice a day.    With your rapid acting insulin Humalog, take 1 unit for every 8 grams of carbohydrate and to correct high blood sugars, take 1 unit for every 50 over 150.  -these insulins could be called Novolog, Merilog, Fiasp (insulin aspart), Humalog, Admelog, Lyumjev (insulin lispro U100 or U200), or Apidra (insulin glulisine).    If you don't have long acting, you must take corrections every 3 -4 hours with your rapid acting insulin to keep your blood sugars under control.  This includes overnight.      **If you have any questions regarding this information or need clarification, please call 's office.      Insulin Pump Emergency Kit Checklist:    When you wear an insulin pump, the following things need  to be with you at all times:  a vial or pen of rapid acting insulin (Humalog/lispro or Novolog/aspart for example).  This is the insulin you use in your pump.  Remember to not expose insulin to extreme temperatures and the expiration date when out of the refrigerator (28 days).  Do not forget an insulin syringe if using an insulin vial or pen needles if using an insulin pen.  Many patients carry a single insulin syringe in the case with their blood sugar meter for emergencies.  Extra infusion set(s) and reservoir/cartridge(s) or pod  Extra batteries or charging cable/charging pad  Glucose tablets/gel or some other rapid acting carb source for treatment of low blood sugars  Blood glucose monitor including meter, strips, lancet device and lancets  Urine ketone testing strips  Glucagon emergency kit      Follow up:  Follow-up diabetes education send a [employer] message in 2 weeks.      Bring blood glucose meter and logbook with you to all doctor and follow-up appointments.     [facility] for the [facility]:  For Your Diabetes or Nutrition Education Appointments Call:  [phone]   For Diabetes or Nutrition Related Questions:   [phone]  Send [employer] Message   If you need a medication refill please contact your pharmacy. Please allow 3 business days for your refills to be completed.

## 2025-03-04 NOTE — LETTER
3/4/2025         RE: Heidi Madden  [address] [place] [address]        Dear Colleague,    Thank you for referring your patient, Heidi Madden, to the [facility]. Please see a copy of my visit note below.    Diabetes Self-Management Education & Support    Presents for: Insulin Pump and CGM Review    Type of Service: In Person Visit      Assessment  Glucose time in range improving on insulin pump therapy     Insulin Pump Information  Insulin Pump Brand: OmniPod    Reports:                    Review of Pump and CGM report. Glucose overall average 129mg/dl,  in target 77%, above target 16% and below target 7% of the time. Insulin pump average TDD 47.6 unit(s)/day,  32% basal/68% bolus.  Pattern of post meal hyperglycemia and pre-meal hypoglycemia with frequent auto suspending.     Patient would benefit from increase in carbohydrate ratio and bolus 5-10 minutes before meals, decrease glucose target and not overtreat hypoglycemia.     Care Plan and Education Provided:  Pump Hardware & Software:   -- Navigating insulin pump screen/functions/features  -- Automated insulin delivery functions/features  Bolusing:  -- Importance of accurate carbohydrate counting  -- How to use bolus calculator  Problem Solving:  -- Treating hypoglycemia with use of automated insulin delivery systems  -- DKA prevention & steps to take when glucose is above 240 mg/dL   -- Sick Day management  -- Multiple Daily injection back-up plan in case of pump failure    Patient verbalized understanding of diabetes self-management education concepts discussed, opportunities for ongoing education and support, and recommendations provided today.    Plan  Changes made to your pump today:    Max bolus: 20 --> 25    Glucose target: 120 --> 110  Carbohydrate ratio:    12am: 7.5 --> 8.0    11am: 7.5 --> 8.5     4pm: 7.5 --> 8.0     Follow up:  Follow-up diabetes education send a [employer] message in 2 weeks.  "    Follow-up:  Upcoming Diabetes Ed Appointments     Visit Type Date Time Department    DIABETES ED 3/4/2025  2:30 PM [facility] DIABETES ED        See Care Plan for co-developed, patient-state behavior change goals.    Education Materials Provided:  No new materials provided today      Subjective/Objective  Malcolm is an 24 year old year old, presenting for the following diabetes education related to: Presents for: Insulin Pump and CGM Review  Accompanied by: Self  Diabetes education in the past 24mo: Yes  Focus of Visit: Insulin Pump, CGM  Type of Pump visit: Pump Review  Diabetes type: Type 1  Disease course: Stable  How confident are you filling out medical forms by yourself:: Extremely  Transportation concerns: No  Difficulty affording diabetes medication?: No  Difficulty affording diabetes testing supplies?: No  Other concerns:: None  Cultural Influences/Ethnic Background:  Not  or     Diabetes Symptoms & Complications:  Diabetes Related Symptoms: None  Weight trend: Stable  Symptom course: Stable  Disease course: Stable  Complications assessed today?: No    Patient Problem List and Family Medical History reviewed for relevant medical history, current medical status, and diabetes risk factors.    Vitals:  LMP 01/30/2025 (Exact Date)   Estimated body mass index is 23.03 kg/m  as calculated from the following:    Height as of 1/30/25: 1.651 m (5' 5\").    Weight as of 1/30/25: 62.8 kg (138 lb 6.4 oz).   Last 3 BP:   BP Readings from Last 3 Encounters:   01/30/25 120/76   12/04/24 118/72   10/09/24 101/69       History   Smoking Status    Never   Smokeless Tobacco    Never     Comment: No one in family smokes.       Labs:  Lab Results   Component Value Date    A1C 5.8 11/27/2024    A1C 5.5 12/10/2019     Lab Results   Component Value Date     03/16/2018     03/16/2018     Lab Results   Component Value Date    LDL 86 01/04/2023     03/15/2019     HDL Cholesterol   Date Value Ref Range " "Status   03/15/2019 55 >45 mg/dL Final     Direct Measure HDL   Date Value Ref Range Status   01/04/2023 73 >=50 mg/dL Final   ]  GFR Estimate   Date Value Ref Range Status   11/27/2024 >90 >60 mL/min/1.73m2 Final     Comment:     The generation of the estimated GFR is currently based on binary male or female sex. If the electronic health record information indicates another gender identity or if Legal Sex is recorded as \"Unknown\", GFR estimates are not automatically calculated, and application of GFR equations or a direct GFR measurement should be considered according to the individual's appropriate clinical context.  eGFR calculated using 2021 CKD-EPI equation.   03/16/2018 >90 >60 mL/min/1.7m2 Final     Comment:     Non  GFR Calc     GFR Estimate If Black   Date Value Ref Range Status   03/16/2018 >90 >60 mL/min/1.7m2 Final     Comment:      GFR Calc     Lab Results   Component Value Date    CR 0.67 11/27/2024    CR 0.56 03/16/2018     No results found for: \"MICROALBUMIN\"      Monitoring:  Monitoring Assessed Today: Yes  Did patient bring glucose meter to appointment? : Yes  Blood Glucose Meter: Accu-chek, CGM  Times checking blood sugar at home (number): 5+  Times checking blood sugar at home (per): Day    Taking Medications:  Diabetes Medication(s)       Diabetic Other       Glucagon (BAQSIMI TWO PACK) 3 MG/DOSE nasal powder Spray 1 spray (3 mg) in nostril as needed (hypoglycemic siezure or unconsciousness).       Insulin       insulin degludec (TRESIBA FLEXTOUCH) 100 UNIT/ML pen Inject 15 Units subcutaneously daily.     Insulin Glargine-yfgn (SEMGLEE, YFGN,) 100 UNIT/ML SOPN Inject 15 Units subcutaneously daily.     insulin lispro (HUMALOG KWIKPEN) 100 UNIT/ML (1 unit dial) KWIKPEN INJECT UP TO 60 UNITS DAILY     insulin lispro (HUMALOG VIAL) 100 UNIT/ML vial Use in insulin pump Est TDD 70 units          Taking Medication Assessed Today: Yes  Current Treatments: Insulin " Pump  Dose schedule: Pre-breakfast, Pre-lunch, Pre-dinner  Given by: Patient  Injection/Infusion sites: Abdomen, Arms, Buttocks, Thighs  Problems taking diabetes medications regularly?: Yes  Diabetes medication side effects?: No    Problem Solving:  Problem Solving Assessed Today: Yes  Is the patient at risk for hypoglycemia?: Yes  Hypoglycemia Frequency: Daily  Hypoglycemia Treatment: Other food, Candy, Juice  Medical ID: No  Does patient have glucagon emergency kit?: Yes  Is the patient at risk for DKA?: Yes  Does patient have ketone test strips?: Yes  Does patient have DKA prevention plan?: Yes  Hypoglycemia: Confusion, Dizziness/Lightheadedness, Sweats, None, Nervousness/Anxiety (tongue- numb)  Hypoglycemia Complications: Nocturnal hypoglycemia    Milena Grey RN  Time Spent: 70 minutes  Encounter Type: Individual    Any diabetes medication dose changes were made via the CDCES Standing Orders under the patient's referring provider.

## 2025-03-17 ENCOUNTER — MYC MEDICAL ADVICE (OUTPATIENT)
Dept: EDUCATION SERVICES | Facility: CLINIC | Age: 25
End: 2025-03-17
Payer: COMMERCIAL

## 2025-03-17 NOTE — TELEPHONE ENCOUNTER
Diabetes Follow-up    Subjective/Objective:    Heidi ARAMBULA Maryanne requests review of glucose and monitoring report.   Comments/concerns: see mychart message    Diabetes is being managed with insulin pump    Report:                         Assessment/Plan:  Alexander Fowler,     Thank you for the update. I looked at your report and I agree things are going well but there is room for improvement. I noticed you had a couple of episodes of low blood sugar overnight,and you are still having a lot of automated pausing of the basal insulin throughout the day. You also continue to have higher readings after meals but it's inconsistent.     I would like you to  try a couple of things:  1) raise your target back to 120. (You will find this in your menu, under bolus)  2) try setting a timer on your phone to look at your glucose 2 hours after eating and take a correction dose. Do your best and you won't be able to do this every time.  3) take a correction dose if you notice you are trending high with increased stress.   4)use the activity mode with increased activity, or when you have any concerns about a low blood sugar. You can find the activity mode in the menu. Start out by setting it for 1-2 hour. The pump will try to target a glucose of 150 instead of the 120 for the 1-2 hours then it will return back to a target of 120. You can also use it before bed if you are concerned about low glucose overnight.   5) treat a low or potential low with 5-15 grams of carbohydrate.      Try this for the next week then send me an update, or sooner if needed. We can tweak it a little more before your appointment with Nikky Maki.      Milena SALINASN, RN, PHN, [facility]         Any diabetes medication dose changes were made via the CDE Protocol and Collaborative Practice Agreement with the patient's referring provider. A copy of this encounter was shared with the provider.

## 2025-04-07 ENCOUNTER — MYC MEDICAL ADVICE (OUTPATIENT)
Dept: EDUCATION SERVICES | Facility: CLINIC | Age: 25
End: 2025-04-07
Payer: COMMERCIAL

## 2025-04-07 NOTE — TELEPHONE ENCOUNTER
.Diabetes Follow-up    Subjective/Objective:    Heidi TYESHA Madden requests review of glucose and monitoring report.   Comments/concerns: see mychart message    Diabetes is being managed with insulin pump    Report:                       Assessment/Plan:    Review of Pump and CGM report. Glucose overall average 142mg/dl,  in target 79%, above target 20% and below target 1% of the time. Insulin pump average TDD 47.9 unit(s)/day,  41% basal/59% bolus. Pattern of post meal hyperglycemia, followed by hypoglycemia, with frequent automated pause of basal insulin.     Recommend strengthen carbohydrate ratio and increase active insulin time.       Carbohydrate ratio:  12:00 AM (11 hr) 7.8 --> 7.2  11:00 AM (5 hr) 8.4 --> 7.8  4:00 PM (8 hr) 7.9 --> 7.3    BG Target Range  12:00 AM (24 hr) 120 mg/dL --> 130  BG Correction Threshold  12:00 AM (24 hr) 120 mg/dL --> 130    Active insulin time: 3 hours --> 4 hours    Milena SALINASN, RN, PHN, [facility]       Any diabetes medication dose changes were made via the CDE Protocol and Collaborative Practice Agreement with the patient's referring provider. A copy of this encounter was shared with the provider.

## 2025-04-15 ENCOUNTER — OFFICE VISIT (OUTPATIENT)
Dept: ENDOCRINOLOGY | Facility: CLINIC | Age: 25
End: 2025-04-15
Payer: COMMERCIAL

## 2025-04-15 VITALS
OXYGEN SATURATION: 100 % | WEIGHT: 141.4 LBS | BODY MASS INDEX: 23.56 KG/M2 | HEART RATE: 69 BPM | TEMPERATURE: 98 F | SYSTOLIC BLOOD PRESSURE: 125 MMHG | RESPIRATION RATE: 18 BRPM | HEIGHT: 65 IN | DIASTOLIC BLOOD PRESSURE: 72 MMHG

## 2025-04-15 DIAGNOSIS — E10.65 TYPE 1 DIABETES MELLITUS WITH HYPERGLYCEMIA (H): Primary | ICD-10-CM

## 2025-04-15 LAB
ANION GAP SERPL CALCULATED.3IONS-SCNC: 11 MMOL/L (ref 7–15)
BUN SERPL-MCNC: 5.5 MG/DL (ref 6–20)
CALCIUM SERPL-MCNC: 9.4 MG/DL (ref 8.8–10.4)
CHLORIDE SERPL-SCNC: 104 MMOL/L (ref 98–107)
CREAT SERPL-MCNC: 0.68 MG/DL (ref 0.51–1.17)
EGFRCR SERPLBLD CKD-EPI 2021: >90 ML/MIN/1.73M2
EST. AVERAGE GLUCOSE BLD GHB EST-MCNC: 117 MG/DL
GLUCOSE SERPL-MCNC: 112 MG/DL (ref 70–99)
HBA1C MFR BLD: 5.7 % (ref 0–5.6)
HCO3 SERPL-SCNC: 24 MMOL/L (ref 22–29)
POTASSIUM SERPL-SCNC: 3.5 MMOL/L (ref 3.4–5.3)
SODIUM SERPL-SCNC: 139 MMOL/L (ref 135–145)

## 2025-04-15 PROCEDURE — 36415 COLL VENOUS BLD VENIPUNCTURE: CPT | Performed by: PHYSICIAN ASSISTANT

## 2025-04-15 PROCEDURE — 80048 BASIC METABOLIC PNL TOTAL CA: CPT | Performed by: PHYSICIAN ASSISTANT

## 2025-04-15 PROCEDURE — 83036 HEMOGLOBIN GLYCOSYLATED A1C: CPT | Performed by: PHYSICIAN ASSISTANT

## 2025-04-15 PROCEDURE — 84681 ASSAY OF C-PEPTIDE: CPT | Performed by: PHYSICIAN ASSISTANT

## 2025-04-15 RX ORDER — ACYCLOVIR 400 MG/1
TABLET ORAL
COMMUNITY
Start: 2024-12-04

## 2025-04-15 RX ORDER — SERTRALINE HYDROCHLORIDE 100 MG/1
TABLET, FILM COATED ORAL
COMMUNITY
Start: 2025-01-03

## 2025-04-15 NOTE — PATIENT INSTRUCTIONS
[facility]  Dr Fox, Endocrinology Department    [address] [place] [id number]  [place] [address]  Appointment [phone]  Fax: [phone]  [place]: Monday - Thursday

## 2025-04-15 NOTE — PROGRESS NOTES
"Endocrine Consult note    Attending Assessment/Plan :         I have independently reviewed and interpreted labs, imaging as indicated.      Chief complaint:  Heidi is a 24 year old adult seen in consultation at the request of   I have reviewed Care Everywhere including [facility], [facility], [facility] lab reports, imaging reports and provider notes as indicated.      HISTORY OF PRESENT ILLNESS      Time Basal Rate Carb Ratio Sensitivity Target BG   0000 0.75 unit(s)/hr 1u:7.2g 50 mg/dl 120 mg/dl   1100 0.75 unit(s)/hr 1u:7.8g 50 mg/dl 120 mg/dl   1600 0.75 unit(s)/hr 1u:7.3g 50 mg/dl 120 mg/dl   Active Insulin Time: 4 hrs    Endocrine relevant labs are as follows:   Latest Reference Range & Units 11/27/24 15:33   Hemoglobin A1C 0.0 - 5.6 % 5.8 (H)   (H): Data is abnormally high   Latest Reference Range & Units 11/27/24 15:45   Albumin Urine mg/L mg/L <12.0      Latest Reference Range & Units 04/10/24 10:49   Hemoglobin A1C 0.0 - 5.6 % 6.3 (H)   (H): Data is abnormally high    REVIEW OF SYSTEMS    {Endocrine:089957::\"10 system ROS otherwise as per the HPI or negative\"}    Past Medical History  No past medical history on file.    Medications  Current Outpatient Medications   Medication Sig Dispense Refill    Acetone, Urine, Test (KETONE TEST) STRP 1 strip by In Vitro route as needed (use if BG >240 and/or symptoms of DKA). For use/testing with urine 50 strip 2    ASPIRIN NOT PRESCRIBED (INTENTIONAL) Please choose reason not prescribed from choices below.      Continuous Glucose Sensor (DEXCOM G7 SENSOR) MISC 1 each every 10 days. Change sensor every 10 days 9 each 3    fluticasone (FLONASE) 50 MCG/ACT nasal spray Spray 1 spray into both nostrils daily. 11.1 mL 0    Glucagon (BAQSIMI TWO PACK) 3 MG/DOSE nasal powder Spray 1 spray (3 mg) in nostril as needed (hypoglycemic siezure or unconsciousness). 1 each 1    insulin degludec (TRESIBA FLEXTOUCH) " 100 UNIT/ML pen Inject 15 Units subcutaneously daily. 15 mL 0    Insulin Disposable Pump (OMNIPOD 5 G7 INTRO, GEN 5,) KIT 1 kit continuously. 1 kit 0    Insulin Disposable Pump (OMNIPOD 5 G7 PODS, GEN 5,) MISC 1 pod every 72 hours. 30 each 3    Insulin Glargine-yfgn (SEMGLEE, YFGN,) 100 UNIT/ML SOPN Inject 15 Units subcutaneously daily. 15 mL 3    insulin lispro (HUMALOG KWIKPEN) 100 UNIT/ML (1 unit dial) KWIKPEN INJECT UP TO 60 UNITS DAILY 30 mL 3    insulin lispro (HUMALOG VIAL) 100 UNIT/ML vial Use in insulin pump Est TDD 70 units 60 mL 3    insulin pen needle (BD PEN NEEDLE KRISTA 2ND GEN) 32G X 4 MM miscellaneous USE WITH INSULIN PEN 8 TIMES DAILY 810 each 1    ketoconazole (NIZORAL) 2 % external cream Apply topically to the affected areas twice daily for one month      OneTouch Delica Lancets 33G MISC 8 each daily 300 each 11    ONETOUCH VERIO IQ test strip Use to test blood sugar 8 times daily or as directed. 750 strip 0    sertraline (ZOLOFT) 50 MG tablet Take 2 tablets (100 mg) by mouth daily.      Urine Glucose-Ketones Test STRP Check ketones when BG > 300 x 2 or when sick/vomiting 50 each 5       Allergies  Allergies   Allergen Reactions    No Known Drug Allergy          Family History  family history includes Alcohol/Drug in Malcolm's paternal grandfather; Circulatory in Malcolm's paternal grandmother; Diabetes in Malcolm's paternal grandmother; Family History Negative in Malcolm's father, sister, and sister; Heart Disease in Malcolm's paternal grandfather; Hypertension in Malcolm's maternal grandfather; Lipids in Malcolm's father, maternal grandmother, and sister; Psychotic Disorder in Malcolm's father and another family member; Thyroid Disease in Malcolm's mother and paternal grandmother.    Social History  Social History     Tobacco Use    Smoking status: Never    Smokeless tobacco: Never    Tobacco comments:     No one in family smokes.   Substance Use Topics    Alcohol use: No    Drug use: No       Physical Exam  There were no  vitals taken for this visit.  There is no height or weight on file to calculate BMI.  GENERAL :  In no apparent distress  SKIN: Normal color, normal temperature, texture.  No hirsutism, alopecia or purple striae.     EYES: PERRL, EOMI, No scleral icterus,  No proptosis, conjunctival redness, stare, retraction  MOUTH: Moist, pink; pharynx clear  NECK: No visible masses. No palpable adenopathy, or masses. No carotid bruits. THYROID:  Normal, nontender, smooth / firm texture,  no nodules, no Bruit   RESP: Lungs clear to auscultation bilaterally  CARDIAC: Regular rate and rhythm, normal S1 S2, without murmurs, rubs or gallops  ABDOMEN: Normal bowel sounds; soft, nontender, no HSM or masses       NEURO: awake, alert, responds appropriately to questions.  Cranial nerves intact.   Moves all extremities; Gait normal.  No tremor of the outstretched hand.  DTRs   /4 ,   EXTREMITIES: No clubbing, cyanosis or edema.    DATA REVIEW  Omnipod/Dexcom Report  Time in target range 72%  Low 1%, High 20%, Very High 7%  Current Ave  mg/dl

## 2025-04-16 LAB
C PEPTIDE SERPL-MCNC: <0.1 NG/ML (ref 0.9–6.9)
FASTING STATUS PATIENT QL REPORTED: NO

## 2025-05-14 ENCOUNTER — DOCUMENTATION ONLY (OUTPATIENT)
Dept: OTHER | Facility: CLINIC | Age: 25
End: 2025-05-14
Payer: COMMERCIAL

## 2025-07-10 NOTE — PROGRESS NOTES
Outcome for 07/10/25 7:58 AM: Data uploaded on Christophe England MA  Outcome for 07/18/25 11:48 AM: Christophe emailed to provider  Asha England MA

## 2025-07-22 ENCOUNTER — VIRTUAL VISIT (OUTPATIENT)
Dept: ENDOCRINOLOGY | Facility: CLINIC | Age: 25
End: 2025-07-22
Payer: COMMERCIAL

## 2025-07-22 DIAGNOSIS — Z96.41 INSULIN PUMP IN PLACE: ICD-10-CM

## 2025-07-22 DIAGNOSIS — E10.649 TYPE 1 DIABETES MELLITUS WITH HYPOGLYCEMIA AND WITHOUT COMA (H): Primary | ICD-10-CM

## 2025-07-22 PROCEDURE — 98006 SYNCH AUDIO-VIDEO EST MOD 30: CPT | Performed by: INTERNAL MEDICINE

## 2025-07-22 PROCEDURE — 95251 CONT GLUC MNTR ANALYSIS I&R: CPT | Performed by: INTERNAL MEDICINE

## 2025-07-22 NOTE — LETTER
"7/22/2025      Heidi Madden  [address] [place] [address]      Dear Colleague,    Thank you for referring your patient, Heidi Madden, to the [facility]. Please see a copy of my visit note below.    Outcome for 07/10/25 7:58 AM: Data uploaded on DocOnYou  Asha England MA  Outcome for 07/18/25 11:48 AM: [place] emailed to provider  Asha England MA      THIS IS A VIDEO VISIT:    Phone call visit/virtual visit encounter:    Name of patient: Heidi Madden    Date of encounter: 7/22/2025    Time of start of video visit: 9:31    Video started: 9:40    Video ended: 9:55    Provider location: off site/ [facility]    Patient location: patients home.    Mode of transmission: Sentri video/ [employer]    Verbal consent: obtained before starting visit. Pt is agreeable.      The patient has been notified of following:      \"This VIDEO visit will be conducted via a call between you and your physician/provider. We have found that certain health care needs can be provided without the need for a physical exam.  This service lets us provide the care you need with a short phone conversation.  If a prescription is necessary we can send it directly to your pharmacy.  If lab work is needed we can place an order for that and you can then stop by our lab to have the test done at a later time.     With new updates with corona virus patient might be billed as clinic visit.     If during the course of the call the physician/provider feels a telephone visit is not appropriate, you will not be charged for this service.\"      Past medical history, social history, family history, allergy and medications were reviewed and updated as appropriate.  Reviewed pertinent labs, notes, imaging studies personally.    Endocrinology Clinic Note:  Name: Heidi Madden  Seen for f/u of type 1 Diabetes.  HPI:  Malcolm Madden is a 23 year old who presents for the evaluation/management of type 1 diabetes.  Was seen by " pediatric endocrinologist  before.    She is switched to insulin pump in early 2025.  Currently she is on omnipod 5 and dexcom G7.  Using humalog in pump.    Reports that they have long acting insulin in case of pump malfunction at home. Malcolm also has glucagon kit at home for emergency.    She works at [employer].  She works in shifts.  Breakfast at 6:45 AM, lunch at 11:45 AM and dinner at 6:45 PM and bedtime 11:00 PM.  Living with mother and sister.  Feeling ok for the most part.  Tired at work in AM.  Weight is stable.    Wt Readings from Last 2 Encounters:   04/15/25 64.1 kg (141 lb 6.4 oz)   01/30/25 62.8 kg (138 lb 6.4 oz)        1. Type 1 DM:  Orginally diagnosed: 3/16/2018  Hospitalization for DKA: no  Current Regimen:           BS checks: dexcom  Average Meter Download: Blood glucose data reviewed personally. See nursing note from this encounter for details.  Blood sugars are mostly in acceptable range.  Exercise: walks/ bike ride everyday.  Last A1c: 6.3%  Symptoms of hypoglycemia (low blood sugar): yes  Using PUMP: No    DM Complications:   Complications:   Diabetes Complications  Description / Detail    Diabetic Retinopathy  No   CAD / PAD  No   Neuropathy  No   Nephropathy / Microalbuminuria  No  No results found for: UMALCR      Gastroparesis  No   Hypoglycemia [place]  No       2. Hypertension: Blood Pressure today:   BP Readings from Last 3 Encounters:   04/15/25 125/72   01/30/25 120/76   12/04/24 118/72     3. Hyperlipidemia:  On medication    PMH/PSH:  No past medical history on file.  Past Surgical History:   Procedure Laterality Date     [facility] REIMPLANT URETER,VESICOPSOAS HITCH  01/05     Family Hx:  Family History   Problem Relation Age of Onset     Thyroid Disease Mother         hashimoyoto'd     Family History Negative Father      Lipids Father      Psychotic Disorder Father         anger mood swings     Lipids Maternal Grandmother      Hypertension Maternal Grandfather       Diabetes Paternal Grandmother      Thyroid Disease Paternal Grandmother         hypo, partial thyroidectomy     Circulatory Paternal Grandmother         coroid arteries clogged, scraped     Heart Disease Paternal Grandfather         Angioplasty     Alcohol/Drug Paternal Grandfather      Family History Negative Sister      Family History Negative Sister      Lipids Sister         as early teen     Psychotic Disorder Other         bipolar  2 first cousins     Cancer No family hx of         sno cancer. maternal uncle           DM2: aunt, uncle and pgm.           Social Hx:  Social History     Socioeconomic History     Marital status: Single     Spouse name: Not on file     Number of children: Not on file     Years of education: Not on file     Highest education level: Not on file   Occupational History     Not on file   Tobacco Use     Smoking status: Never     Smokeless tobacco: Never     Tobacco comments:     No one in family smokes.   Substance and Sexual Activity     Alcohol use: No     Drug use: No     Sexual activity: Never     Comment: constantine march 2018   Other Topics Concern     Not on file   Social History Narrative     Not on file     Social Drivers of Health     Financial Resource Strain: Low Risk  (2/15/2024)    [facility]    Financial Resource Strain      Difficulty of Paying Living Expenses: 3      Difficulty of Paying Living Expenses: Not on file   Food Insecurity: No Food Insecurity (2/15/2024)    [facility]    Food Insecurity      Do you worry your food will run out before you are able to buy more?: 1   Transportation Needs: No Transportation Needs (2/15/2024)    [facility]    Transportation Needs      Does lack of transportation keep you from medical appointments?: 1      Does lack of transportation keep you from work, meetings or getting things that you need?:  1   Physical Activity: Not on file   Stress: Not on file   Social Connections: Socially Integrated (2/15/2024)    [facility]    Social Connections      Do you often feel lonely or isolated from those around you?: 0   Interpersonal Safety: Low Risk  (1/30/2025)    Interpersonal Safety      Do you feel physically and emotionally safe where you currently live?: Yes      Within the past 12 months, have you been hit, slapped, kicked or otherwise physically hurt by someone?: No      Within the past 12 months, have you been humiliated or emotionally abused in other ways by your partner or ex-partner?: No   Housing Stability: Low Risk  (2/15/2024)    [facility] from [facility]    Housing Stability      What is your housing situation today?: 1          MEDICATIONS:  has a current medication list which includes the following prescription(s): ketone test, reason aspirin not prescribed (intentional), dexcom g7 , dexcom g7 sensor, fluticasone, baqsimi two pack, omnipod 5 g7 intro (gen 5), omnipod 5 g7 pods (gen 5), insulin lispro, bd pen needle lynda 2nd gen, ketoconazole, onetouch delica lancets 33g, onetouch verio iq, sertraline, statin not prescribed, and urine glucose-ketones test.    ROS     ROS: 10 point ROS neg other than the symptoms noted above in the HPI.    Physical Exam   There were no vitals taken for this visit.  GENERAL: healthy, alert and no distress  EYES: Eyes grossly normal to inspection, conjunctivae and sclerae normal  ENT: no nose swelling, nasal discharge.  Thyroid: no apparent thyroid nodules  RESP: no audible wheeze, cough, or visible cyanosis.  No visible retractions or increased work of breathing.  Able to speak fully in complete sentences.  ABDO: not evaluated.  EXTREMITIES: no hand tremors.  NEURO: Cranial nerves grossly intact, mentation intact and speech normal  SKIN: No apparent skin lesions, rash or edema seen    PSYCH: mentation appears normal, affect normal/bright, judgement and insight intact, normal speech and appearance well-groomed    LABS:  A1c:  Lab Results   Component Value Date    A1C 5.7 04/15/2025    A1C 5.8 11/27/2024    A1C 6.3 04/10/2024    A1C 6.4 10/11/2023    A1C 6.3 06/14/2023    A1C 5.5 12/10/2019    A1C 6.1 09/10/2019    A1C 6.1 06/18/2019    A1C 5.7 02/19/2019    A1C 6.0 11/20/2018     BMP:  Creatinine   Date Value Ref Range Status   04/15/2025 0.68 0.51 - 1.17 mg/dL Final     Comment:     Male and Female  0-2 Months    0.31-0.88 mg/dL  2-12 Months   0.16-0.39 mg/dL  1-2 Years     0.18-0.35 mg/dL  3-4 Years     0.26-0.42 mg/dL  5-6 Years     0.29-0.47 mg/dL  7-8 Years     0.34-0.53 mg/dL  9-10 Years    0.33-0.64 mg/dL  11-12 Years   0.44-0.68 mg/dL  13-14 Years   0.46-0.77 mg/dL    Female  15 Years and older  0.51-0.95 mg/dL    Male  15 Years and older  0.67-1.17 mg/dL       03/16/2018 0.56 0.50 - 1.00 mg/dL Final     Urine Micro:  Lab Results   Component Value Date    UMALCR  11/27/2024      Comment:      Unable to calculate, urine albumin and/or urine creatinine is outside detectable limits.  Microalbuminuria is defined as an albumin:creatinine ratio of 17 to 299 for males and 25 to 299 for females. A ratio of albumin:creatinine of 300 or higher is indicative of overt proteinuria.  Due to biologic variability, positive results should be confirmed by a second, first-morning random or 24-hour timed urine specimen. If there is discrepancy, a third specimen is recommended. When 2 out of 3 results are in the microalbuminuria range, this is evidence for incipient nephropathy and warrants increased efforts at glucose control, blood pressure control, and institution of therapy with an angiotensin-converting-enzyme (ACE) inhibitor (if the patient can tolerate it).      UMALCR  05/25/2022      Comment:      Unable to calculate:  Urine creatinine or albumin value below detectable level        LFTs/Lipids:  Recent Labs   Lab Test 01/04/23  0818 03/15/19  0909   CHOL 168 176*   HDL 73 55   LDL 86 109   TRIG 47 58     TFTs:  TSH   Date Value Ref Range Status   11/27/2024 2.54 0.30 - 4.20 uIU/mL Final   05/25/2022 1.82 0.40 - 4.00 mU/L Final   11/23/2018 3.52 0.40 - 4.00 mU/L Final       Blood Glucose and pump data/ Meter reviewed.     All pertinent notes, labs, and images personally reviewed by me.       Glucometer/ insulin pump (if applicable)/ CGM data (if applicable) downloaded, Personally reviewed and interpreted.  All Blood sugar data reviewed personally and interpreted as well as discussed with pt.  All past medical, social and Family history reviewed and updated in [facility].    A/P  Ms.Abby TYESHA Madden is a 25 year old here for the evaluation/management of diabetes:    1. DM1 - Under Fair control.  A1c 5.7 %. Average blood sugar is 151 with TIR 72%.  Noted variable blood sugar data on a day to day basis.   Noted overcorrection of low blood sugar resulting into hyperglycemia.  She is using auto mode 99% of time.  No known diabetes related complications.  Plan:  Discussed diagnosis, pathophysiology, management and treatment options of condition with pt.  I also discussed importance of strict blood sugar control to prevent complications associated with uncontrolled diabetes.  Labs needed. (You can make a lab only appointment at [place] when you have appointment with Aida)  Change sensitivity from 55 to 60.  Rest pump settings same.  Continue to use auto mode as much as possible.  Avoid overcorrection for low blood sugar episode.  Follow-up with CDE which he has scheduled and follow-up with Nikky Maki as scheduled in November 2025.  Fasting labs appointment before 11/2025 appointment with Nikky Maki PA-C.  Labs and follow-up 3-4 months after that with .  Please make a lab appointment for blood work and follow up clinic appointment in 1 week after that to discuss results.    2. Hypertension -  Under Good  control. Not on medication.    3. Hyperlipidemia - Under Good control. Not on medication. LDL 86  Check FLP in November 2025  4. Prevention  Ophthalmology-recommend annually  ASA-not indicated secondary to age  Smoking-non-smoker    Foot exam- 6/14/2023      Most Recent Immunizations   Administered Date(s) Administered     COVID-19 12+ (Pfizer) 09/27/2024     COVID-19 Bivalent 12+ (Pfizer) 06/24/2023     COVID-19 Monovalent 18+ (Moderna) 05/18/2021     Comvax (HIB/HepB) 07/11/2001     DTAP (<7y) 06/29/2004     HEPA 03/26/2013     HPV 03/26/2013     Influenza Intranasal Vaccine 09/21/2013     Influenza Vaccine >6 months,quad, PF 10/17/2023     Influenza Vaccine, 6+MO IM (QUADRIVALENT W/PRESERVATIVES) 10/15/2019     Influenza, Split Virus, Trivalent, Pf (Fluzone\Fluarix) 10/17/2024     Influenza,INJ,MDCK,PF,Quad >6mo(Flucelvax) 10/17/2023     MMR (MMRII) 06/29/2004     Meningococcal ACWY (Menactra ) 07/11/2016     Nasal Influenza Vaccine 2-49 (FluMist) 11/23/2018     Pneumo Conj 13-V (2010&after) 11/14/2019     Pneumococcal (PCV 7) 07/11/2001     Pneumococcal 23 valent 01/16/2020     Poliovirus, inactivated (IPV) 06/29/2004     TDAP (Adacel,Boostrix) 05/24/2022     TDAP Vaccine (Boostrix) 06/27/2011     Typhoid Oral 03/27/2017     Varicella (Varivax) 06/27/2011     Plan: GLUCOSE MONITOR, 72 HOUR, PHYS INTERP,         Hemoglobin A1c, Albumin Random Urine         Quantitative with Creat Ratio, Creatinine,         Hemoglobin A1c, TSH with free T4 reflex, Lipid         panel reflex to direct LDL Fasting         Recommend checking blood sugars before meals and at bedtime.    If Blood glucose are low more often-> 2-3 times/week- give us a call.  The patient is advised to Make better food choices: reduce carbs, Reduce portion size, weight loss and exercise 3-4 times a week.  Discussed hypoglycemia signs and symptoms as well as management in detail.      There is some variability among people, most will usually  develop symptoms suggestive of hypoglycemia when blood glucose levels are lowered to the mid 60's. The first set of symptoms are called adrenergic. Patients may experience any of the following nervousness, sweating, intense hunger, trembling, weakness, palpitations, and difficulty speaking. When BS fall below 50 the patient is unable to talk and take oral therapy.  Would recommend Glucagon emergency kit for the patient and education for family and friends around the patient.   The acute management of hypoglycemia involves the rapid delivery of a source of easily absorbed sugar. Regular soda, juice, lifesavers, table sugar, are good options. 15 grams of glucose is the dose that is given, followed by an assessment of symptoms and a blood glucose check if possible. If after 10 minutes there is no improvement, another 10-15 grams should be given. This can be repeated up to three times. The equivalency of 10-15 grams of glucose (approximate servings) are: Four lifesavers, 4 teaspoons of sugar, or 1/2 cup or 4 oz of juice or regular pop.    Discussed indications, risks and benefits of all medications prescribed, and answered questions to patient's satisfaction.  The longitudinal plan of care for the diagnosis(es)/condition(s) as documented were addressed during this visit. Due to the added complexity in care, I will continue to support Malcolm in the subsequent management and with ongoing continuity of care.  All questions were answered.  The patient indicates understanding of the above issues and agrees with the plan set forth.      Follow-up:  As noted in AVS    Zakia Fox M.D  Endocrinology  [facility]  CC: No Ref-Primary, Physician    Disclaimer: This note consists of symbols derived from keyboarding, dictation and/or voice recognition software. As a result, there may be errors in the script that have gone undetected. Please consider this when interpreting information found in this  chart.    Addendum to above note and clinic visit:    Labs reviewed.    See result note/telephone encounter.            Again, thank you for allowing me to participate in the care of your patient.        Sincerely,        Zakia Fox MD    Electronically signed

## 2025-07-22 NOTE — PATIENT INSTRUCTIONS
[facility]  Dr Fox, Endocrinology Department    [facility]   [address] [facility]  [place] [address]  Appointment [phone]  Fax: [phone]  [place]: Monday - Thursday      To provide the best diabetic care, please bring your blood glucose meter to each and every visit with your Endocrinologist.  Your blood glucose meter/insulin pump will be downloaded at every appointment.    Please arrive 15 minutes before your scheduled appointment.  This will allow for your blood glucose meter/insulin pump to be downloaded.  If you are wearing DEXCOM please bring  or sharing code so that it can be downloaded.  If you are using freestyle victorino personal sensors please bring the reader.  If you are using TANDEM insulin pump please have your username and password to get info from [employer] website.    Labs needed. (You can make a lab only appointment at [place] when you have appointment with [facility])  Change sensitivity from 55 to 60.  Rest pump settings same.  Continue to use auto mode as much as possible.  Avoid overcorrection for low blood sugar episode.    Follow-up with CDE which he has scheduled and follow-up with Nikky Maki as scheduled in 2025.  Fasting labs appointment before 2025 appointment with Nikky Maki PA-C.    Labs and follow-up 3-4 months after that with .  Please make a lab appointment for blood work and follow up clinic appointment in 1 week after that to discuss results.    Recommend checking blood sugars before meals and at bedtime.    If Blood glucose are low more often-> 2-3 times/week- give us a call.  Make better food choices: reduce carbs, Reduce portion size, weight loss and exercise 3-4 times a week.    What is hypoglycemia:  Hypoglycemia is when blood sugar levels become too low - below 70 m/dl.      What causes hypoglycemia?  - using too much insulin  -taking too many diabetes pills  -not eating enough,  or skipping meals or snacks  -not eating enough carbohydrate with meals  -changing your exercise routine  -drinking alcohol in excess    It is also possible to have hypoglycemia even when you are carefully managing your blood sugar levels.    What does it feel like when blood sugars get too low?  You may feel:  - anxious  -confused  -dizzy  -hungry  -light-headed  -nervous  -shaky  -sleepy  -sweaty    You may have  -blurred or cloudy vision  -heart palpitations (heart skips a beat or races)  -tingling or numbness around the mouth and tongue  -tremors    What to do if you have symptoms of hypoglycmemia:  If you think your blood sugar is too low, check it with a glucose meter.  If its below 70 mg/dl, consume one of the following:  Fruit juice (1/2 cup)  Glucose tablets (15 grams)  Hard candy (5 to 7 pieces)  Honey or sugar (2 teaspoons)  Milk (1/2 cup)  Soft drink (non-diet, 1/2 cup)    Wait 15 minutes and check your blood glucose again.  IF it is still below 70 mg/dl, have another food item listed above. Wait another 15 minutes and repeat the blood glucose test.  Have a small meal or snack that contains some carbohydrate after your blood glucose rises above 70 mg/dl.    If you are at risk of hypoglycemia, always carry with you glucose tablets or one of the foods listed above.      To prevent Hypoglycemia:  Avoid situations that may cause hypoglycemia  Before making any change to your diet or exercise routine, discuss them with your healthcare provider  Keep a record of your blood glucose levels.  Include the time of day, diabetes medications, when you had your last meal or snack, and what you were doing at the time (e.g. Watching TV, gardening, jogging, etc).    Talk to your healthcare provider if your blood glucose levels are often low        Patient guide on hypoglycemia    http://www.hormone.org/Resources/upload/patient-guide-diagnosis-and-management-hypoglycemia-[email or web address]

## 2025-07-22 NOTE — NURSING NOTE
Current patient location: [place]    Is the patient currently in the state of [place]? YES    Visit mode: VIDEO    If the visit is dropped, the patient can be reconnected by:VIDEO VISIT: Text to cell phone:   Telephone Information:   Mobile [phone]   Mobile [phone]       Will anyone else be joining the visit? NO  (If patient encounters technical issues they should call [phone]    Are changes needed to the allergy or medication list? E-check in was reviewed/completed for today's visit. VF did not review e-check in information again with Malcolm due to this.       Are refills needed on medications prescribed by this physician? Discuss with provider    Rooming Documentation:  Not applicable    Reason for visit: RECHECK (Patient would like more insight on diabetes control. )    Mariel HAMILTON

## 2025-07-22 NOTE — PROGRESS NOTES
"THIS IS A VIDEO VISIT:    Phone call visit/virtual visit encounter:    Name of patient: Heidi Madden    Date of encounter: 7/22/2025    Time of start of video visit: 9:31    Video started: 9:40    Video ended: 9:55    Provider location: off site/ [facility]    Patient location: patients home.    Mode of transmission: [employer] video/ [employer]    Verbal consent: obtained before starting visit. Pt is agreeable.      The patient has been notified of following:      \"This VIDEO visit will be conducted via a call between you and your physician/provider. We have found that certain health care needs can be provided without the need for a physical exam.  This service lets us provide the care you need with a short phone conversation.  If a prescription is necessary we can send it directly to your pharmacy.  If lab work is needed we can place an order for that and you can then stop by our lab to have the test done at a later time.     With new updates with corona virus patient might be billed as clinic visit.     If during the course of the call the physician/provider feels a telephone visit is not appropriate, you will not be charged for this service.\"      Past medical history, social history, family history, allergy and medications were reviewed and updated as appropriate.  Reviewed pertinent labs, notes, imaging studies personally.    Endocrinology Clinic Note:  Name: Heidi Madden  Seen for f/u of type 1 Diabetes.  HPI:  Malcolm Madden is a 23 year old who presents for the evaluation/management of type 1 diabetes.  Was seen by pediatric endocrinologist  before.    She is switched to insulin pump in early 2025.  Currently she is on omnipod 5 and dexcom G7.  Using humalog in pump.    Reports that they have long acting insulin in case of pump malfunction at home. Malcolm also has glucagon kit at home for emergency.    She works at [employer].  She works in shifts.  Breakfast at 6:45 AM, lunch at 11:45 AM and " dinner at 6:45 PM and bedtime 11:00 PM.  Living with mother and sister.  Feeling ok for the most part.  Tired at work in AM.  Weight is stable.    Wt Readings from Last 2 Encounters:   04/15/25 64.1 kg (141 lb 6.4 oz)   01/30/25 62.8 kg (138 lb 6.4 oz)        1. Type 1 DM:  Orginally diagnosed: 3/16/2018  Hospitalization for DKA: no  Current Regimen:           BS checks: dexcom  Average Meter Download: Blood glucose data reviewed personally. See nursing note from this encounter for details.  Blood sugars are mostly in acceptable range.  Exercise: walks/ bike ride everyday.  Last A1c: 6.3%  Symptoms of hypoglycemia (low blood sugar): yes  Using PUMP: No    DM Complications:   Complications:   Diabetes Complications  Description / Detail    Diabetic Retinopathy  No   CAD / PAD  No   Neuropathy  No   Nephropathy / Microalbuminuria  No  No results found for: UMALCR      Gastroparesis  No   Hypoglycemia [place]  No       2. Hypertension: Blood Pressure today:   BP Readings from Last 3 Encounters:   04/15/25 125/72   01/30/25 120/76   12/04/24 118/72     3. Hyperlipidemia:  On medication    PMH/PSH:  No past medical history on file.  Past Surgical History:   Procedure Laterality Date    C REIMPLANT URETER,VESICOPSOAS HITCH  01/05     Family Hx:  Family History   Problem Relation Age of Onset    Thyroid Disease Mother         hashimantonio'd    Family History Negative Father     Lipids Father     Psychotic Disorder Father         anger mood swings    Lipids Maternal Grandmother     Hypertension Maternal Grandfather     Diabetes Paternal Grandmother     Thyroid Disease Paternal Grandmother         hypo, partial thyroidectomy    Circulatory Paternal Grandmother         coroid arteries clogged, scraped    Heart Disease Paternal Grandfather         Angioplasty    Alcohol/Drug Paternal Grandfather     Family History Negative Sister     Family History Negative Sister     Lipids Sister         as early teen    Psychotic  Disorder Other         bipolar  2 first cousins    Cancer No family hx of         sno cancer. maternal uncle           DM2: aunt, uncle and pgm.           Social Hx:  Social History     Socioeconomic History    Marital status: Single     Spouse name: Not on file    Number of children: Not on file    Years of education: Not on file    Highest education level: Not on file   Occupational History    Not on file   Tobacco Use    Smoking status: Never    Smokeless tobacco: Never    Tobacco comments:     No one in family smokes.   Substance and Sexual Activity    Alcohol use: No    Drug use: No    Sexual activity: Never     Comment: constantine march 2018   Other Topics Concern    Not on file   Social History Narrative    Not on file     Social Drivers of Health     Financial Resource Strain: Low Risk  (2/15/2024)    Received from [facility]    Financial Resource Strain     Difficulty of Paying Living Expenses: 3     Difficulty of Paying Living Expenses: Not on file   Food Insecurity: No Food Insecurity (2/15/2024)    Received from [facility]    Food Insecurity     Do you worry your food will run out before you are able to buy more?: 1   Transportation Needs: No Transportation Needs (2/15/2024)    Received from [facility]    Transportation Needs     Does lack of transportation keep you from medical appointments?: 1     Does lack of transportation keep you from work, meetings or getting things that you need?: 1   Physical Activity: Not on file   Stress: Not on file   Social Connections: Socially Integrated (2/15/2024)    Received from [facility]    Social Connections     Do you often feel lonely or isolated from those around you?: 0   Interpersonal Safety: Low Risk  (1/30/2025)    Interpersonal Safety     Do you feel physically and emotionally safe where you currently live?: Yes     Within the  past 12 months, have you been hit, slapped, kicked or otherwise physically hurt by someone?: No     Within the past 12 months, have you been humiliated or emotionally abused in other ways by your partner or ex-partner?: No   Housing Stability: Low Risk  (2/15/2024)    [facility]    Housing Stability     What is your housing situation today?: 1          MEDICATIONS:  has a current medication list which includes the following prescription(s): ketone test, reason aspirin not prescribed (intentional), dexcom g7 , dexcom g7 sensor, fluticasone, baqsimi two pack, omnipod 5 g7 intro (gen 5), omnipod 5 g7 pods (gen 5), insulin lispro, bd pen needle lynda 2nd gen, ketoconazole, onetouch delica lancets 33g, onetouch verio iq, sertraline, statin not prescribed, and urine glucose-ketones test.    ROS     ROS: 10 point ROS neg other than the symptoms noted above in the HPI.    Physical Exam   There were no vitals taken for this visit.  GENERAL: healthy, alert and no distress  EYES: Eyes grossly normal to inspection, conjunctivae and sclerae normal  ENT: no nose swelling, nasal discharge.  Thyroid: no apparent thyroid nodules  RESP: no audible wheeze, cough, or visible cyanosis.  No visible retractions or increased work of breathing.  Able to speak fully in complete sentences.  ABDO: not evaluated.  EXTREMITIES: no hand tremors.  NEURO: Cranial nerves grossly intact, mentation intact and speech normal  SKIN: No apparent skin lesions, rash or edema seen   PSYCH: mentation appears normal, affect normal/bright, judgement and insight intact, normal speech and appearance well-groomed    LABS:  A1c:  Lab Results   Component Value Date    A1C 5.7 04/15/2025    A1C 5.8 11/27/2024    A1C 6.3 04/10/2024    A1C 6.4 10/11/2023    A1C 6.3 06/14/2023    A1C 5.5 12/10/2019    A1C 6.1 09/10/2019    A1C 6.1 06/18/2019    A1C 5.7 02/19/2019    A1C 6.0 11/20/2018     BMP:  Creatinine   Date  Value Ref Range Status   04/15/2025 0.68 0.51 - 1.17 mg/dL Final     Comment:     Male and Female  0-2 Months    0.31-0.88 mg/dL  2-12 Months   0.16-0.39 mg/dL  1-2 Years     0.18-0.35 mg/dL  3-4 Years     0.26-0.42 mg/dL  5-6 Years     0.29-0.47 mg/dL  7-8 Years     0.34-0.53 mg/dL  9-10 Years    0.33-0.64 mg/dL  11-12 Years   0.44-0.68 mg/dL  13-14 Years   0.46-0.77 mg/dL    Female  15 Years and older  0.51-0.95 mg/dL    Male  15 Years and older  0.67-1.17 mg/dL       03/16/2018 0.56 0.50 - 1.00 mg/dL Final     Urine Micro:  Lab Results   Component Value Date    UMALCR  11/27/2024      Comment:      Unable to calculate, urine albumin and/or urine creatinine is outside detectable limits.  Microalbuminuria is defined as an albumin:creatinine ratio of 17 to 299 for males and 25 to 299 for females. A ratio of albumin:creatinine of 300 or higher is indicative of overt proteinuria.  Due to biologic variability, positive results should be confirmed by a second, first-morning random or 24-hour timed urine specimen. If there is discrepancy, a third specimen is recommended. When 2 out of 3 results are in the microalbuminuria range, this is evidence for incipient nephropathy and warrants increased efforts at glucose control, blood pressure control, and institution of therapy with an angiotensin-converting-enzyme (ACE) inhibitor (if the patient can tolerate it).      UMALCR  05/25/2022      Comment:      Unable to calculate:  Urine creatinine or albumin value below detectable level       LFTs/Lipids:  Recent Labs   Lab Test 01/04/23  0818 03/15/19  0909   CHOL 168 176*   HDL 73 55   LDL 86 109   TRIG 47 58     TFTs:  TSH   Date Value Ref Range Status   11/27/2024 2.54 0.30 - 4.20 uIU/mL Final   05/25/2022 1.82 0.40 - 4.00 mU/L Final   11/23/2018 3.52 0.40 - 4.00 mU/L Final       Blood Glucose and pump data/ Meter reviewed.     All pertinent notes, labs, and images personally reviewed by me.       Glucometer/ insulin pump (if  applicable)/ CGM data (if applicable) downloaded, Personally reviewed and interpreted.  All Blood sugar data reviewed personally and interpreted as well as discussed with pt.  All past medical, social and Family history reviewed and updated in [facility].    A/P  Ms.Abby TYESHA Madden is a 25 year old here for the evaluation/management of diabetes:    1. DM1 - Under Fair control.  A1c 5.7 %. Average blood sugar is 151 with TIR 72%.  Noted variable blood sugar data on a day to day basis.   Noted overcorrection of low blood sugar resulting into hyperglycemia.  She is using auto mode 99% of time.  No known diabetes related complications.  Plan:  Discussed diagnosis, pathophysiology, management and treatment options of condition with pt.  I also discussed importance of strict blood sugar control to prevent complications associated with uncontrolled diabetes.  Labs needed. (You can make a lab only appointment at [place] when you have appointment with [facility])  Change sensitivity from 55 to 60.  Rest pump settings same.  Continue to use auto mode as much as possible.  Avoid overcorrection for low blood sugar episode.  Follow-up with CDE which he has scheduled and follow-up with Nikky Maki as scheduled in November 2025.  Fasting labs appointment before 11/2025 appointment with Nikky Maki PA-C.  Labs and follow-up 3-4 months after that with .  Please make a lab appointment for blood work and follow up clinic appointment in 1 week after that to discuss results.    2. Hypertension - Under Good  control. Not on medication.    3. Hyperlipidemia - Under Good control. Not on medication. LDL 86  Check FLP in November 2025  4. Prevention  Ophthalmology-recommend annually  ASA-not indicated secondary to age  Smoking-non-smoker    Foot exam- 6/14/2023      Most Recent Immunizations   Administered Date(s) Administered    COVID-19 12+ (Pfizer) 09/27/2024    COVID-19 Bivalent 12+ (Pfizer) 06/24/2023    COVID-19 Monovalent 18+  (Moderna) 05/18/2021    Comvax (HIB/HepB) 07/11/2001    DTAP (<7y) 06/29/2004    HEPA 03/26/2013    HPV 03/26/2013    Influenza Intranasal Vaccine 09/21/2013    Influenza Vaccine >6 months,quad, PF 10/17/2023    Influenza Vaccine, 6+MO IM (QUADRIVALENT W/PRESERVATIVES) 10/15/2019    Influenza, Split Virus, Trivalent, Pf (Fluzone\Fluarix) 10/17/2024    Influenza,INJ,MDCK,PF,Quad >6mo(Flucelvax) 10/17/2023    MMR (MMRII) 06/29/2004    Meningococcal ACWY (Menactra ) 07/11/2016    Nasal Influenza Vaccine 2-49 (FluMist) 11/23/2018    Pneumo Conj 13-V (2010&after) 11/14/2019    Pneumococcal (PCV 7) 07/11/2001    Pneumococcal 23 valent 01/16/2020    Poliovirus, inactivated (IPV) 06/29/2004    TDAP (Adacel,Boostrix) 05/24/2022    TDAP Vaccine (Boostrix) 06/27/2011    Typhoid Oral 03/27/2017    Varicella (Varivax) 06/27/2011     Plan: GLUCOSE MONITOR, 72 HOUR, PHYS INTERP,         Hemoglobin A1c, Albumin Random Urine         Quantitative with Creat Ratio, Creatinine,         Hemoglobin A1c, TSH with free T4 reflex, Lipid         panel reflex to direct LDL Fasting         Recommend checking blood sugars before meals and at bedtime.    If Blood glucose are low more often-> 2-3 times/week- give us a call.  The patient is advised to Make better food choices: reduce carbs, Reduce portion size, weight loss and exercise 3-4 times a week.  Discussed hypoglycemia signs and symptoms as well as management in detail.      There is some variability among people, most will usually develop symptoms suggestive of hypoglycemia when blood glucose levels are lowered to the mid 60's. The first set of symptoms are called adrenergic. Patients may experience any of the following nervousness, sweating, intense hunger, trembling, weakness, palpitations, and difficulty speaking. When BS fall below 50 the patient is unable to talk and take oral therapy.  Would recommend Glucagon emergency kit for the patient and education for family and friends around  the patient.   The acute management of hypoglycemia involves the rapid delivery of a source of easily absorbed sugar. Regular soda, juice, lifesavers, table sugar, are good options. 15 grams of glucose is the dose that is given, followed by an assessment of symptoms and a blood glucose check if possible. If after 10 minutes there is no improvement, another 10-15 grams should be given. This can be repeated up to three times. The equivalency of 10-15 grams of glucose (approximate servings) are: Four lifesavers, 4 teaspoons of sugar, or 1/2 cup or 4 oz of juice or regular pop.    Discussed indications, risks and benefits of all medications prescribed, and answered questions to patient's satisfaction.  The longitudinal plan of care for the diagnosis(es)/condition(s) as documented were addressed during this visit. Due to the added complexity in care, I will continue to support Malcolm in the subsequent management and with ongoing continuity of care.  All questions were answered.  The patient indicates understanding of the above issues and agrees with the plan set forth.      Follow-up:  As noted in AVS    Zakia Fox M.D  Endocrinology  [facility]  CC: No Ref-Primary, Physician    Disclaimer: This note consists of symbols derived from keyboarding, dictation and/or voice recognition software. As a result, there may be errors in the script that have gone undetected. Please consider this when interpreting information found in this chart.    Addendum to above note and clinic visit:    Labs reviewed.    See result note/telephone encounter.

## 2025-07-24 ENCOUNTER — MYC MEDICAL ADVICE (OUTPATIENT)
Dept: EDUCATION SERVICES | Facility: CLINIC | Age: 25
End: 2025-07-24
Payer: COMMERCIAL

## 2025-07-24 NOTE — TELEPHONE ENCOUNTER
Diabetes Follow-up    Subjective/Objective:    Heidi Madden requests review of glucose and monitoring report.   Comments/concerns: see mychart message    Diabetes is being managed with Diabetes Medications   Diabetes Medication(s)       Diabetic Other       Glucagon (BAQSIMI TWO PACK) 3 MG/DOSE nasal powder Spray 1 spray (3 mg) in nostril as needed (hypoglycemic siezure or unconsciousness).       Insulin       insulin lispro (HUMALOG VIAL) 100 UNIT/ML vial Use in insulin pump Est TDD 70 units        Insulin Pump    Report:                   Assessment/Plan:  Post meal hyperglycemia and hypoglycemia between meals.     See mychart response.    Milena SALINASN, RN, PHN, CDCES         Any diabetes medication dose changes were made via the CDE Protocol and Collaborative Practice Agreement with the patient's referring provider. A copy of this encounter was shared with the provider.

## 2025-07-29 ENCOUNTER — OFFICE VISIT (OUTPATIENT)
Dept: EDUCATION SERVICES | Facility: CLINIC | Age: 25
End: 2025-07-29
Attending: PHYSICIAN ASSISTANT
Payer: COMMERCIAL

## 2025-07-29 ENCOUNTER — LAB (OUTPATIENT)
Dept: LAB | Facility: CLINIC | Age: 25
End: 2025-07-29
Payer: COMMERCIAL

## 2025-07-29 DIAGNOSIS — E10.65 TYPE 1 DIABETES MELLITUS WITH HYPERGLYCEMIA (H): Primary | ICD-10-CM

## 2025-07-29 DIAGNOSIS — E10.649 TYPE 1 DIABETES MELLITUS WITH HYPOGLYCEMIA AND WITHOUT COMA (H): ICD-10-CM

## 2025-07-29 LAB
EST. AVERAGE GLUCOSE BLD GHB EST-MCNC: 128 MG/DL
HBA1C MFR BLD: 6.1 % (ref 0–5.6)

## 2025-07-29 PROCEDURE — 36415 COLL VENOUS BLD VENIPUNCTURE: CPT

## 2025-07-29 PROCEDURE — 83036 HEMOGLOBIN GLYCOSYLATED A1C: CPT

## 2025-07-29 PROCEDURE — G0108 DIAB MANAGE TRN  PER INDIV: HCPCS

## 2025-07-29 NOTE — PROGRESS NOTES
Diabetes Self-Management Education & Support    Presents for: Insulin Pump Review    Type of Service: In Person Visit      Assessment  Patient expresses frustration over not being able to control her diabetes. Her glucose is high after meals, but then she experiences lows between meals or before meals, she especially notices this before dinner. She does add snacks when needed to treat/prevent a low and does not enter the carbohydrates into the pump. Also she tries to correct a high glucose but the pump does not offer a correction.     Insulin Pump Information  Insulin Pump Brand: OmniPod    Reports                        Review of Pump and CGM report. Glucose overall average 176mg/dl,  in target 59%, above target 39% and below target 3% of the time. Insulin pump average TDD 53.8 unit(s)/day,  47% basal/53% bolus.  Pattern of post meal hyperglycemia and hypoglycemia between meals. Patient would benefit from a stronger carbohydrate ratio and active insulin time.   Per rule of 450 her calculated carbohydrate ratio is 8.36, Her carbohydrate ratio is close but since she consistently has elevated glucose after meals recommend she decrease her carbohydrate ratio by 0.2 at breakfast and dinner since these are the 2 highest meals.    Per rule of 1800 her calculated sensitivity is 33. Her sensitivity is very conservative, recommend adjusting sensitivity. Patient prefers to be conservative on adjusting doses. Recommend decrease from 60 --> 50    Review of diet and carbohydrate counting. Patient states she tends to eat more carbohydrates than protein. Recommend she incorporate balance in her diet.    Care Plan and Education Provided:  Taking Medication: Action of prescribed medication(s)  Bolusing:  -- Importance of bolusing before meals  Problem Solving:  -- Treating hypoglycemia with use of automated insulin delivery systems  Activity & Lifestyle:  -- Temporary Basal Rates    Patient verbalized understanding of diabetes  self-management education concepts discussed, opportunities for ongoing education and support, and recommendations provided today.    Plan  Try having some protein/fat with your meals, and snacks.   Try having some of the protein and/or veggies before the carbohydrates.     Changes made to your pump today:  Correction/sensitivity: 60 --> 50 ( this will offer more correction dose when correcting a high glucose in manual mode)     Carbohydrate ratio:( more insulin for breakfast and lunch carbohydrates)  12am: 7.3 --> decrease to 7.1 --> if after your next pod change you still have high blood sugars above 220 then decrease to 6.9.  10am: 9.0 --> decrease to 8.8 --> if after your next pod change you still have high blood sugars above 220 then decrease to 8.6.  4pm: 7.9 --> no change today,     Active insulin time: 3.5 hours to --> 3.0 hours ( this should allow the pump to offer a correction dose sooner in automated mode when correcting high glucose)    Typically with an automated insulin pump you only need 5-10 grams of carbohydrates to treat a low.  - try entering half the carbohydrates when treating/preventing a low blood sugar.     Use the activity mode any time you are active or concerned about a low blood sugar. Start with setting it for 1-2 hours     Follow up:  Send [employer] updates  with how you are doing and any insight you can provide on Sunday evenings. Milena will review the results and make changes if needed.   Follow-up diabetes education appointment 1-2 month.     See Care Plan for co-developed, patient-state behavior change goals.    Education Materials Provided:  No new materials provided today      Subjective/Objective  Malcolm is an 25 year old, presenting for the following diabetes education related to: Insulin Pump Review  Accompanied by: Self  Diabetes education in the past 24mo: (Patient-Rptd) Yes  Focus of Visit: Insulin Pump, CGM  Type of Pump visit: Pump Review  Diabetes type: (Patient-Rptd) Type  "1  Disease course: (Patient-Rptd) Getting harder to manage  How confident are you filling out medical forms by yourself:: (Patient-Rptd) Extremely  Diabetes management related comments/concerns: (Patient-Rptd) It is incredibly difficult to control my blood sugars  Cultural Influences/Ethnic Background:  Not  or     Diabetes Symptoms & Complications:  Diabetes Related Symptoms: (Patient-Rptd) Polydipsia (increased thirst)  Weight trend: (Patient-Rptd) Stable  Symptom course: (Patient-Rptd) Worsening  Disease course: (Patient-Rptd) Getting harder to manage  Complications assessed today?: No    Patient Problem List and Family Medical History reviewed for relevant medical history, current medical status, and diabetes risk factors.    Vitals:  There were no vitals taken for this visit.  Estimated body mass index is 23.53 kg/m  as calculated from the following:    Height as of 4/15/25: 1.651 m (5' 5\").    Weight as of 4/15/25: 64.1 kg (141 lb 6.4 oz).   Last 3 BP:   BP Readings from Last 3 Encounters:   04/15/25 125/72   01/30/25 120/76   12/04/24 118/72       History   Smoking Status    Never   Smokeless Tobacco    Never     Comment: No one in family smokes.       Labs:  Lab Results   Component Value Date    A1C 6.1 07/29/2025    A1C 5.5 12/10/2019     Lab Results   Component Value Date     04/15/2025     03/16/2018     03/16/2018     Lab Results   Component Value Date    LDL 86 01/04/2023     03/15/2019     HDL Cholesterol   Date Value Ref Range Status   03/15/2019 55 >45 mg/dL Final     Direct Measure HDL   Date Value Ref Range Status   01/04/2023 73 >=50 mg/dL Final     GFR Estimate   Date Value Ref Range Status   04/15/2025 >90 >60 mL/min/1.73m2 Final     Comment:     The generation of the estimated GFR is currently based on binary male or female sex. If the electronic health record information indicates another gender identity or if Legal Sex is recorded as \"Unknown\", GFR " estimates are not automatically calculated, and application of GFR equations or a direct GFR measurement should be considered according to the individual's appropriate clinical context.  eGFR calculated using 2021 CKD-EPI equation.   03/16/2018 >90 >60 mL/min/1.7m2 Final     Comment:     Non  GFR Calc     GFR Estimate If Black   Date Value Ref Range Status   03/16/2018 >90 >60 mL/min/1.7m2 Final     Comment:      GFR Calc     Lab Results   Component Value Date    CR 0.68 04/15/2025    CR 0.56 03/16/2018     Lab Results   Component Value Date    MICROL <12.0 11/27/2024    UMALCR  11/27/2024      Comment:      Unable to calculate, urine albumin and/or urine creatinine is outside detectable limits.  Microalbuminuria is defined as an albumin:creatinine ratio of 17 to 299 for males and 25 to 299 for females. A ratio of albumin:creatinine of 300 or higher is indicative of overt proteinuria.  Due to biologic variability, positive results should be confirmed by a second, first-morning random or 24-hour timed urine specimen. If there is discrepancy, a third specimen is recommended. When 2 out of 3 results are in the microalbuminuria range, this is evidence for incipient nephropathy and warrants increased efforts at glucose control, blood pressure control, and institution of therapy with an angiotensin-converting-enzyme (ACE) inhibitor (if the patient can tolerate it).      UCRR 11.6 11/27/2024 7/24/2025   Healthy Eating   Healthy Eating Assessed Today Yes   Cultural/[employer] diet restrictions? No   How many times a week on average do you eat food made away from home (restaurant/take-out)? 0   Meals include Breakfast;Lunch;Dinner;Afternoon Snack   Other typically more carbs than protein   Beverages Water;Tea;Coffee;Diet soda;Sports drinks;Energy drinks;Coffee drinks;Alcohol         7/24/2025   Being Active   Being Active Assessed Today No   Barrier to exercise Time;Access;Other          7/24/2025   Monitoring   Monitoring Assessed Today Yes   Did patient bring glucose meter to appointment?  Yes   Blood Glucose Meter Accu-chek;CGM   Times checking blood sugar at home (number) 5+   Times checking blood sugar at home (per) Day     Diabetes Medication(s)       Diabetic Other       Glucagon (BAQSIMI TWO PACK) 3 MG/DOSE nasal powder Spray 1 spray (3 mg) in nostril as needed (hypoglycemic siezure or unconsciousness).       Insulin       insulin lispro (HUMALOG VIAL) 100 UNIT/ML vial Use in insulin pump Est TDD 70 units              7/24/2025   Taking Medications   Taking Medication Assessed Today Yes   Current Treatments Insulin Pump   Dose schedule Other   Given by Patient   Injection/Infusion sites Abdomen;Arms   Problems taking diabetes medications regularly? No   Diabetes medication side effects? No         7/24/2025   Problem Solving   Problem Solving Assessed Today Yes   Is the patient at risk for hypoglycemia? Yes   Hypoglycemia Frequency Weekly   Hypoglycemia Treatment Other food           7/24/2025   Reducing Risks   Reducing Risks Assessed Today No   Has dilated eye exam at least once a year? No   Sees dentist every 6 months? Yes   Feet checked by healthcare provider in the last year? Yes         7/24/2025   Healthy Coping: Diabetes Distress Assessment   Healthy Coping Assessed Today Yes   I feel burned out by all of the attention and effort that diabetes demands of me. 3 - A Moderate Problem   It bothers me that diabetes seems to control my life. 3 - A Moderate Problem   I am frustrated that even when I do what I am supposed to for my diabetes, it doesn't seem to make a difference. 3 - A Moderate Problem   No matter how hard I try with my diabetes, it feels like it will never be good enough. 3 - A Moderate Problem   I am so tired of having to worry about diabetes all the time. 3 - A Moderate Problem   When it comes to my diabetes, I often feel like a failure. 1 - Not a Problem   It  depresses me when I realize that my diabetes will likely never go away. 1 - Not a Problem   Living with diabetes is overwhelming for me. 3 - A Moderate Problem   T2 DDAS Total Score (0 - 1.9 Little or no DD, 2.0 - 2.9 Moderate DD,  3.0+ High DD) 2.5    Informal Support system: Friends       Patient-reported       Milena SALINASN, RN, PHN, [facility]   Time Spent: 60 minutes  Encounter Type: Individual    Any diabetes medication dose changes were made via the [facility] Standing Orders under the patient's referring provider.

## 2025-07-29 NOTE — PATIENT INSTRUCTIONS
Plan:   Try having some protein/fat with your meals, and snacks.   Try having some of the protein and/or veggies before the carbohydrates.     Changes made to your pump today:  Correction/sensitivity: 60 --> 50 ( this will offer more correction dose when correcting a high glucose in manual mode)     Carbohydrate ratio:( more insulin for breakfast and lunch carbohydrates)  12am: 7.3 --> decrease to 7.1 --> if after your next pod change you still have high blood sugars above 220 then decrease to 6.9.  10am: 9.0 --> decrease to 8.8 --> if after your next pod change you still have high blood sugars above 220 then decrease to 8.6.  4pm: 7.9 --> no change today,     Active insulin time: 3.5 hours to --> 3.0 hours ( this should allow the pump to offer a correction dose sooner in automated mode when correcting high glucose)    Typically with an automated insulin pump you only need 5-10 grams of carbohydrates to treat a low.  - try entering half the carbohydrates when treating/preventing a low blood sugar.     Use the activity mode any time you are active or concerned about a low blood sugar. Start with setting it for 1-2 hours     Follow up:  Send [employer] updates  with how you are doing and any insight you can provide on Sunday evenings. Milena will review the results and make changes if needed.   Follow-up diabetes education appointment 1-2 month.      Bring blood glucose meter and logbook with you to all doctor and follow-up appointments.     [facility] the [facility]:  For Your Diabetes or Nutrition Education Appointments Call:  [phone]   For Diabetes or Nutrition Related Questions:   [phone]  Send [employer] Message   If you need a medication refill please contact your pharmacy. Please allow 3 business days for your refills to be completed.

## 2025-07-29 NOTE — LETTER
7/29/2025         RE: Heidi Madden  [address] [place] [address]        Dear Colleague,    Thank you for referring your patient, Heidi Madden, to the [facility]. Please see a copy of my visit note below.    Diabetes Self-Management Education & Support    Presents for: Insulin Pump Review    Type of Service: In Person Visit      Assessment  Patient expresses frustration over not being able to control her diabetes. Her glucose is high after meals, but then she experiences lows between meals or before meals, she especially notices this before dinner. She does add snacks when needed to treat/prevent a low and does not enter the carbohydrates into the pump. Also she tries to correct a high glucose but the pump does not offer a correction.     Insulin Pump Information  Insulin Pump Brand: OmniPod    Reports                        Review of Pump and CGM report. Glucose overall average 176mg/dl,  in target 59%, above target 39% and below target 3% of the time. Insulin pump average TDD 53.8 unit(s)/day,  47% basal/53% bolus.  Pattern of post meal hyperglycemia and hypoglycemia between meals. Patient would benefit from a stronger carbohydrate ratio and active insulin time.   Per rule of 450 her calculated carbohydrate ratio is 8.36, Her carbohydrate ratio is close but since she consistently has elevated glucose after meals recommend she decrease her carbohydrate ratio by 0.2 at breakfast and dinner since these are the 2 highest meals.    Per rule of 1800 her calculated sensitivity is 33. Her sensitivity is very conservative, recommend adjusting sensitivity. Patient prefers to be conservative on adjusting doses. Recommend decrease from 60 --> 50    Review of diet and carbohydrate counting. Patient states she tends to eat more carbohydrates than protein. Recommend she incorporate balance in her diet.    Care Plan and Education Provided:  Taking Medication: Action of prescribed  medication(s)  Bolusing:  -- Importance of bolusing before meals  Problem Solving:  -- Treating hypoglycemia with use of automated insulin delivery systems  Activity & Lifestyle:  -- Temporary Basal Rates    Patient verbalized understanding of diabetes self-management education concepts discussed, opportunities for ongoing education and support, and recommendations provided today.    Plan  Try having some protein/fat with your meals, and snacks.   Try having some of the protein and/or veggies before the carbohydrates.     Changes made to your pump today:  Correction/sensitivity: 60 --> 50 ( this will offer more correction dose when correcting a high glucose in manual mode)     Carbohydrate ratio:( more insulin for breakfast and lunch carbohydrates)  12am: 7.3 --> decrease to 7.1 --> if after your next pod change you still have high blood sugars above 220 then decrease to 6.9.  10am: 9.0 --> decrease to 8.8 --> if after your next pod change you still have high blood sugars above 220 then decrease to 8.6.  4pm: 7.9 --> no change today,     Active insulin time: 3.5 hours to --> 3.0 hours ( this should allow the pump to offer a correction dose sooner in automated mode when correcting high glucose)    Typically with an automated insulin pump you only need 5-10 grams of carbohydrates to treat a low.  - try entering half the carbohydrates when treating/preventing a low blood sugar.     Use the activity mode any time you are active or concerned about a low blood sugar. Start with setting it for 1-2 hours     Follow up:  Send [employer] updates  with how you are doing and any insight you can provide on Sunday evenings. Milena will review the results and make changes if needed.   Follow-up diabetes education appointment 1-2 month.     See Care Plan for co-developed, patient-state behavior change goals.    Education Materials Provided:  No new materials provided today      Subjective/Objective  Malcolm is an 25 year old, presenting  "for the following diabetes education related to: Insulin Pump Review  Accompanied by: Self  Diabetes education in the past 24mo: (Patient-Rptd) Yes  Focus of Visit: Insulin Pump, CGM  Type of Pump visit: Pump Review  Diabetes type: (Patient-Rptd) Type 1  Disease course: (Patient-Rptd) Getting harder to manage  How confident are you filling out medical forms by yourself:: (Patient-Rptd) Extremely  Diabetes management related comments/concerns: (Patient-Rptd) It is incredibly difficult to control my blood sugars  Cultural Influences/Ethnic Background:  Not  or     Diabetes Symptoms & Complications:  Diabetes Related Symptoms: (Patient-Rptd) Polydipsia (increased thirst)  Weight trend: (Patient-Rptd) Stable  Symptom course: (Patient-Rptd) Worsening  Disease course: (Patient-Rptd) Getting harder to manage  Complications assessed today?: No    Patient Problem List and Family Medical History reviewed for relevant medical history, current medical status, and diabetes risk factors.    Vitals:  There were no vitals taken for this visit.  Estimated body mass index is 23.53 kg/m  as calculated from the following:    Height as of 4/15/25: 1.651 m (5' 5\").    Weight as of 4/15/25: 64.1 kg (141 lb 6.4 oz).   Last 3 BP:   BP Readings from Last 3 Encounters:   04/15/25 125/72   01/30/25 120/76   12/04/24 118/72       History   Smoking Status     Never   Smokeless Tobacco     Never     Comment: No one in family smokes.       Labs:  Lab Results   Component Value Date    A1C 6.1 07/29/2025    A1C 5.5 12/10/2019     Lab Results   Component Value Date     04/15/2025     03/16/2018     03/16/2018     Lab Results   Component Value Date    LDL 86 01/04/2023     03/15/2019     HDL Cholesterol   Date Value Ref Range Status   03/15/2019 55 >45 mg/dL Final     Direct Measure HDL   Date Value Ref Range Status   01/04/2023 73 >=50 mg/dL Final     GFR Estimate   Date Value Ref Range Status   04/15/2025 " ">90 >60 mL/min/1.73m2 Final     Comment:     The generation of the estimated GFR is currently based on binary male or female sex. If the electronic health record information indicates another gender identity or if Legal Sex is recorded as \"Unknown\", GFR estimates are not automatically calculated, and application of GFR equations or a direct GFR measurement should be considered according to the individual's appropriate clinical context.  eGFR calculated using 2021 CKD-EPI equation.   03/16/2018 >90 >60 mL/min/1.7m2 Final     Comment:     Non  GFR Calc     GFR Estimate If Black   Date Value Ref Range Status   03/16/2018 >90 >60 mL/min/1.7m2 Final     Comment:      GFR Calc     Lab Results   Component Value Date    CR 0.68 04/15/2025    CR 0.56 03/16/2018     Lab Results   Component Value Date    MICROL <12.0 11/27/2024    UMALCR  11/27/2024      Comment:      Unable to calculate, urine albumin and/or urine creatinine is outside detectable limits.  Microalbuminuria is defined as an albumin:creatinine ratio of 17 to 299 for males and 25 to 299 for females. A ratio of albumin:creatinine of 300 or higher is indicative of overt proteinuria.  Due to biologic variability, positive results should be confirmed by a second, first-morning random or 24-hour timed urine specimen. If there is discrepancy, a third specimen is recommended. When 2 out of 3 results are in the microalbuminuria range, this is evidence for incipient nephropathy and warrants increased efforts at glucose control, blood pressure control, and institution of therapy with an angiotensin-converting-enzyme (ACE) inhibitor (if the patient can tolerate it).      UCRR 11.6 11/27/2024 7/24/2025   Healthy Eating   Healthy Eating Assessed Today Yes   Cultural/[employer] diet restrictions? No   How many times a week on average do you eat food made away from home (restaurant/take-out)? 0   Meals include " Breakfast;Lunch;Dinner;Afternoon Snack   Other typically more carbs than protein   Beverages Water;Tea;Coffee;Diet soda;Sports drinks;Energy drinks;Coffee drinks;Alcohol         7/24/2025   Being Active   Being Active Assessed Today No   Barrier to exercise Time;Access;Other         7/24/2025   Monitoring   Monitoring Assessed Today Yes   Did patient bring glucose meter to appointment?  Yes   Blood Glucose Meter Accu-chek;CGM   Times checking blood sugar at home (number) 5+   Times checking blood sugar at home (per) Day     Diabetes Medication(s)       Diabetic Other       Glucagon (BAQSIMI TWO PACK) 3 MG/DOSE nasal powder Spray 1 spray (3 mg) in nostril as needed (hypoglycemic siezure or unconsciousness).       Insulin       insulin lispro (HUMALOG VIAL) 100 UNIT/ML vial Use in insulin pump Est TDD 70 units              7/24/2025   Taking Medications   Taking Medication Assessed Today Yes   Current Treatments Insulin Pump   Dose schedule Other   Given by Patient   Injection/Infusion sites Abdomen;Arms   Problems taking diabetes medications regularly? No   Diabetes medication side effects? No         7/24/2025   Problem Solving   Problem Solving Assessed Today Yes   Is the patient at risk for hypoglycemia? Yes   Hypoglycemia Frequency Weekly   Hypoglycemia Treatment Other food           7/24/2025   Reducing Risks   Reducing Risks Assessed Today No   Has dilated eye exam at least once a year? No   Sees dentist every 6 months? Yes   Feet checked by healthcare provider in the last year? Yes         7/24/2025   Healthy Coping: Diabetes Distress Assessment   Healthy Coping Assessed Today Yes   I feel burned out by all of the attention and effort that diabetes demands of me. 3 - A Moderate Problem   It bothers me that diabetes seems to control my life. 3 - A Moderate Problem   I am frustrated that even when I do what I am supposed to for my diabetes, it doesn't seem to make a difference. 3 - A Moderate Problem   No matter  how hard I try with my diabetes, it feels like it will never be good enough. 3 - A Moderate Problem   I am so tired of having to worry about diabetes all the time. 3 - A Moderate Problem   When it comes to my diabetes, I often feel like a failure. 1 - Not a Problem   It depresses me when I realize that my diabetes will likely never go away. 1 - Not a Problem   Living with diabetes is overwhelming for me. 3 - A Moderate Problem   T2 DDAS Total Score (0 - 1.9 Little or no DD, 2.0 - 2.9 Moderate DD,  3.0+ High DD) 2.5    Informal Support system: Friends       Patient-reported       Milena SALINASN, RN, PHN, [facility]   Time Spent: 60 minutes  Encounter Type: Individual    [facility] diabetes medication [facility] under the patient's referring provider.

## 2025-08-03 ENCOUNTER — MYC MEDICAL ADVICE (OUTPATIENT)
Dept: EDUCATION SERVICES | Facility: CLINIC | Age: 25
End: 2025-08-03
Payer: COMMERCIAL

## 2025-08-10 ENCOUNTER — MYC MEDICAL ADVICE (OUTPATIENT)
Dept: EDUCATION SERVICES | Facility: CLINIC | Age: 25
End: 2025-08-10
Payer: COMMERCIAL

## 2025-08-17 ENCOUNTER — MYC MEDICAL ADVICE (OUTPATIENT)
Dept: EDUCATION SERVICES | Facility: CLINIC | Age: 25
End: 2025-08-17
Payer: COMMERCIAL

## 2025-08-31 ENCOUNTER — MYC MEDICAL ADVICE (OUTPATIENT)
Dept: EDUCATION SERVICES | Facility: CLINIC | Age: 25
End: 2025-08-31
Payer: COMMERCIAL